# Patient Record
Sex: MALE | Race: WHITE | Employment: UNEMPLOYED | URBAN - METROPOLITAN AREA
[De-identification: names, ages, dates, MRNs, and addresses within clinical notes are randomized per-mention and may not be internally consistent; named-entity substitution may affect disease eponyms.]

---

## 2020-01-26 NOTE — H&P
Patient Name: Dario Álvarez   Account #: [id number]    Gender: Male    (age): 1965 (47)       Provider:     Vijay Garcia MD        Referring Physician:     Lizandro Edwards Brianda, [address] [facility], [facility]  [phone] (phone)  [phone] (fax)        Chief Complaint: GERD (follow-up), dysphagia           History of Present Illness:        Personal history MR, GERD. Has been feeding with g tube for years. With MR, frequently pulling on tube? Past Medical History      Medical Conditions: No Known Conditions   Surgical Procedures: No Prior Procedures   Dx Studies: Abdominal U/S   Medications: baclofen 10 mg Take tablet by mouth three times a day as directed  cholecalciferol (vitamin D3) 50 mcg (2,000 unit) Take 1 capsule by mouth once a day  Jevity 1.5 Agustín 0.06 gram-1.5 kcal/mL three times a day as directed  omeprazole 40 mg Take 1 capsule once a day  Risperdal 1 mg/mL three times a day as directed   Allergies: Patient has no known allergies   Immunizations: Influenza, seasonal, injectable, 2019      Social History      Alcohol: None   Tobacco: Never smoker   Drugs: None   Exercise: None   Caffeine: None        Family History No Knowledge Of Family History       Review of Systems:   Cardiovascular: Denies chest pain, irregular heart beat, palpitations, peripheral edema, syncope, Sweats. Constitutional: Denies fatigue, fever, loss of appetite, weight gain, weight loss. ENMT: Denies nose bleeds, sore throat, hearing loss. Endocrine: Denies excessive thirst, heat intolerance. Eyes: Denies loss of vision. Gastrointestinal: Presents suffers from Bloating/gas, heartburn, Stool incontinence. Denies abdominal pain, abdominal swelling, change in bowel habits, constipation, diarrhea, jaundice, nausea, rectal bleeding, stomach cramps, vomiting, dysphagia, rectal pain, hematemesis. Genitourinary: Presents suffers from incontinence.  Denies dark urine, dysuria, frequent urination, hematuria. Hematologic/Lymphatic: Denies easy bruising, prolonged bleeding. Integumentary: Presents suffers from sun sensitivity. Denies itching, rashes. Musculoskeletal: Presents suffers from stiffness. Denies arthritis, back pain, gout, joint pain, muscle weakness. Neurological: Denies dizziness, fainting, frequent headaches, memory loss. Psychiatric: Presents suffers from anxiety, depression. Denies difficulty sleeping, hallucinations, nervousness, panic attacks, paranoia. Respiratory: Presents suffers from wheezing. Denies cough, dyspnea. Vital Signs: See RN notes        Physical Exam:   Constitutional:      Appearance: No distress, appears comfortable. ENMT:      External: Normal.   Neck:      Neck: Normal appearance, trachea midline. Respiratory:      Effort: Normal respiratory effort, comfortable, speaks in complete sentences. Auscultation: normal breath sounds, no rubs, wheezes or rhonchi. Cardiovascular: Auscultation: normal, S1 and S2, no gallops , no rubs or murmurs . Gastrointestinal/Abdomen:      Abdomen: non-distended, nontender. Liver/Spleen: normal, normal size, Liver size and consistency normal, spleen is non-palpable. Impressions: Breakage of prosthesis? ?     Plan: PEG Change?  ?

## 2020-01-28 RX ORDER — RISPERIDONE 1 MG/1
1 TABLET, FILM COATED ORAL 3 TIMES DAILY
COMMUNITY

## 2020-01-28 RX ORDER — CHOLECALCIFEROL (VITAMIN D3) 125 MCG
1 CAPSULE ORAL DAILY
COMMUNITY

## 2020-01-28 RX ORDER — BACLOFEN 10 MG/1
10 TABLET ORAL 3 TIMES DAILY
COMMUNITY

## 2020-01-29 ENCOUNTER — HOSPITAL ENCOUNTER (OUTPATIENT)
Age: 55
Setting detail: OUTPATIENT SURGERY
Discharge: HOME | End: 2020-01-29
Attending: INTERNAL MEDICINE | Admitting: INTERNAL MEDICINE
Payer: MEDICAID

## 2020-01-29 VITALS
RESPIRATION RATE: 12 BRPM | OXYGEN SATURATION: 95 % | SYSTOLIC BLOOD PRESSURE: 112 MMHG | HEIGHT: 60 IN | TEMPERATURE: 98.9 F | WEIGHT: 80 LBS | BODY MASS INDEX: 15.71 KG/M2 | DIASTOLIC BLOOD PRESSURE: 63 MMHG | HEART RATE: 80 BPM

## 2020-01-29 PROCEDURE — 76040000003: Performed by: INTERNAL MEDICINE

## 2020-01-29 PROCEDURE — 77030036720 HC NDL CORE BIOP MISSION DISP BARD -B: Performed by: INTERNAL MEDICINE

## 2020-01-29 RX ORDER — ATROPINE SULFATE 0.1 MG/ML
0.5 INJECTION INTRAVENOUS
Status: DISCONTINUED | OUTPATIENT
Start: 2020-01-29 | End: 2020-01-29 | Stop reason: HOSPADM

## 2020-01-29 RX ORDER — EPINEPHRINE 0.1 MG/ML
1 INJECTION INTRACARDIAC; INTRAVENOUS
Status: DISCONTINUED | OUTPATIENT
Start: 2020-01-29 | End: 2020-01-29 | Stop reason: HOSPADM

## 2020-01-29 RX ORDER — SODIUM CHLORIDE 9 MG/ML
50 INJECTION, SOLUTION INTRAVENOUS CONTINUOUS
Status: DISCONTINUED | OUTPATIENT
Start: 2020-01-29 | End: 2020-01-29 | Stop reason: HOSPADM

## 2020-01-29 RX ORDER — MIDAZOLAM HYDROCHLORIDE 1 MG/ML
.25-5 INJECTION, SOLUTION INTRAMUSCULAR; INTRAVENOUS
Status: DISCONTINUED | OUTPATIENT
Start: 2020-01-29 | End: 2020-01-29 | Stop reason: HOSPADM

## 2020-01-29 RX ORDER — DEXTROMETHORPHAN/PSEUDOEPHED 2.5-7.5/.8
1.2 DROPS ORAL
Status: DISCONTINUED | OUTPATIENT
Start: 2020-01-29 | End: 2020-01-29 | Stop reason: HOSPADM

## 2020-01-29 RX ORDER — NALOXONE HYDROCHLORIDE 0.4 MG/ML
0.4 INJECTION, SOLUTION INTRAMUSCULAR; INTRAVENOUS; SUBCUTANEOUS
Status: DISCONTINUED | OUTPATIENT
Start: 2020-01-29 | End: 2020-01-29 | Stop reason: HOSPADM

## 2020-01-29 RX ORDER — FLUMAZENIL 0.1 MG/ML
0.2 INJECTION INTRAVENOUS
Status: DISCONTINUED | OUTPATIENT
Start: 2020-01-29 | End: 2020-01-29 | Stop reason: HOSPADM

## 2020-01-29 RX ORDER — FENTANYL CITRATE 50 UG/ML
100 INJECTION, SOLUTION INTRAMUSCULAR; INTRAVENOUS
Status: DISCONTINUED | OUTPATIENT
Start: 2020-01-29 | End: 2020-01-29 | Stop reason: HOSPADM

## 2020-01-29 NOTE — ROUTINE PROCESS
Raine Ellyn 1965 
[id number] Situation: 
Verbal report received from: Raven Chinchilla RN Procedure: Procedure(s): PERCUTANEOUS ENDOSCOPIC GASTROSTOMY TUBE CHANGE (WITHOUT EGD) Background: 
 
Preoperative diagnosis: BREAKAGE OF PROSTHESIS Postoperative diagnosis: * No post-op diagnosis entered * :  Dr. Andrew Shultz Assistant(s): Endoscopy Technician-1: Juan Hogan Endoscopy RN-1: Patrick Guthrie RN Specimens: * No specimens in log * H. Pylori  no Assessment: 
Intra-procedure medications Anesthesia gave intra-procedure sedation and medications, see anesthesia flow sheet yes Intravenous fluids: NS@ Lori Bolus Vital signs stable Abdominal assessment: round and soft Recommendation: 
Discharge patient per MD order. Family or Friend Permission to share finding with family or friend yes

## 2020-01-29 NOTE — PROCEDURES
PEG removal, PEG button placement      Indication: Feeding difficulties  Post procedure: same    No complications, blood loss    PEG button implant    Findings:    Existing tube removed manually; no endoscopy done, no medications given. 20 mm PEG button device placed and anchored.     May resume feedings today

## 2020-01-29 NOTE — PERIOP NOTES
1310 Procedure being performed with no sedation, nurse at bedside monitoring patient. No scope was used to exchange out PEG tube. Patient tolerated procedure well. Abdomen remains soft and non tender post procedure, no complaints or indication of discomfort noted at this time. Patient transferred to Endoscopy Recovery and report given to recovery nurse.

## 2022-05-05 ENCOUNTER — OFFICE VISIT (OUTPATIENT)
Dept: FAMILY MEDICINE CLINIC | Age: 57
End: 2022-05-05
Payer: MEDICAID

## 2022-05-05 VITALS
WEIGHT: 78 LBS | SYSTOLIC BLOOD PRESSURE: 99 MMHG | BODY MASS INDEX: 15.31 KG/M2 | HEART RATE: 96 BPM | TEMPERATURE: 98.3 F | HEIGHT: 60 IN | DIASTOLIC BLOOD PRESSURE: 67 MMHG | OXYGEN SATURATION: 97 % | RESPIRATION RATE: 14 BRPM

## 2022-05-05 DIAGNOSIS — E55.9 VITAMIN D DEFICIENCY: ICD-10-CM

## 2022-05-05 DIAGNOSIS — Z11.1 SCREENING-PULMONARY TB: ICD-10-CM

## 2022-05-05 DIAGNOSIS — R13.10 DYSPHAGIA, UNSPECIFIED TYPE: ICD-10-CM

## 2022-05-05 DIAGNOSIS — R63.6 UNDERWEIGHT: ICD-10-CM

## 2022-05-05 DIAGNOSIS — Z00.00 PHYSICAL EXAM: Primary | ICD-10-CM

## 2022-05-05 DIAGNOSIS — F79 INTELLECTUAL DISABILITY: ICD-10-CM

## 2022-05-05 DIAGNOSIS — Z99.3 WHEELCHAIR BOUND: ICD-10-CM

## 2022-05-05 DIAGNOSIS — Z93.1 GASTROSTOMY TUBE DEPENDENT (HCC): ICD-10-CM

## 2022-05-05 DIAGNOSIS — G80.9 CEREBRAL PALSY, UNSPECIFIED TYPE (HCC): ICD-10-CM

## 2022-05-05 PROCEDURE — 99204 OFFICE O/P NEW MOD 45 MIN: CPT | Performed by: FAMILY MEDICINE

## 2022-05-05 RX ORDER — POLYETHYLENE GLYCOL 3350 17 G/17G
8 POWDER, FOR SOLUTION ORAL DAILY
COMMUNITY
Start: 2022-02-27

## 2022-05-05 RX ORDER — GLYCOPYRROLATE 1 MG/5ML
LIQUID ORAL
COMMUNITY
Start: 2022-03-24

## 2022-05-05 RX ORDER — IPRATROPIUM BROMIDE 21 UG/1
2 SPRAY, METERED NASAL 2 TIMES DAILY
COMMUNITY
Start: 2022-03-30

## 2022-05-05 RX ORDER — PHENOLPHTHALEIN 90 MG
10 TABLET,CHEWABLE ORAL DAILY
COMMUNITY
Start: 2022-02-04

## 2022-05-05 NOTE — PROGRESS NOTES
Svitlana Damon is a 64 y.o. male    Chief Complaint   Patient presents with    New Patient    Establish Care       1. Have you been to the ER, urgent care clinic since your last visit? Hospitalized since your last visit? No    2. Have you seen or consulted any other health care providers outside of the [address] [place] [address] since your last visit? Include any pap smears or colon screening.  No

## 2022-05-05 NOTE — PROGRESS NOTES
Vesta Wilks (: 1965) is a 64 y.o. male, new patient, here for evaluation of the following chief complaint(s):  New Patient and Establish Care         ASSESSMENT/PLAN:  Below is the assessment and plan developed based on review of pertinent history, physical exam, labs, studies, and medications. 1. Physical exam  Underweight, wheelchair bound male who does not appear ill. Minimal communication but he will make eye contact and follow simple commands   Communication with answering \"yes\" by holding out R hand  Labs unable to be drawn today-- caregivers state they have been able to have them drawn at [address] [place] [address] and will coordinate with [facility]    Records requested from Previous PCP Wilmer Jimenes from [facility]. Will review health maintenance upon receipt    -     CBC WITH AUTOMATED DIFF; Future  -     METABOLIC PANEL, COMPREHENSIVE; Future  -     LIPID PANEL; Future  -     TSH 3RD GENERATION; Future    2. Underweight  Current BMI 17.49 lbs and patient appears thin but not cachectic. Current nutrition includes Jevity 1 can (237mL) via peg TID   Managed by  [facility]   They do not meet with nutritionist   At this point I am unable to determine if this is patient's baseline weight or if he has lost weight as they do not weigh him at the home   -Recommend they START meeting with nutritionist to ensure adequate caloric intake   -They have nutritionist at [facility] they would like to work with and will reach out to her   -Check labs as well-- they will coordinate with [facility] as we were unable to draw the labs in office today   -     [address] DIFF; Future  -     METABOLIC PANEL, COMPREHENSIVE; Future  -     LIPID PANEL; Future  -     TSH 3RD GENERATION; Future  -     VITAMIN D, 25 HYDROXY; Future    3. Vitamin D deficiency  Currently on Vit D 3 2,000IU 1 tab via peg daily   Recheck today to ensure adequate supplementation   -     VITAMIN D, 25 HYDROXY; Future    4.  Cerebral palsy, unspecified type ([facility]  5. Wheelchair bound  Spastic, no use of lower extremities, L arm permanent contracture and minimal use of R arm     6. Gastrostomy tube dependent ([facility]  7. Dysphagia, unspecified type  [facility]   Kirt device  PRN medication list corrected today with instructions for NPO   Records release signed today to obtain records from GI     8. Intellectual disability  Currently on Risperdal 1mg/mL, 1 mL via peg TID for anxiety, aggressive behaviors and self injurious behaviors  Managed by Dr. Williams [facility]    9. Lives in group home  [address] [place] [address] in [place] [address] filled out today     10. Screening-pulmonary TB  Unable to draw labs today-- Quantiferon gold canceled   Return to office next week for PPD placement for TB testing.   -     QUANTIFERON-TB GOLD PLUS      Return for next week PPD, 6 to 12 weeks follow up. SUBJECTIVE/OBJECTIVE:  Chief Complaint   Patient presents with    New Patient    Establish Care     Patient is a 80-year-old male with cerebral palsy, intellectual disability, wheelchair-bound, dysphagia, gastrostomy tube dependent, developmental nonverbal disorder who lives in a group home presenting to office to establish care. Patient was previously [facility] by [facility] however due to insurance changes they are normally able to see the patient. Patient is accompanied by 2 female caregivers from the group home. Group home is called [facility]. Patient has spasticity of his muscles and is wheelchair-bound due to cerebral palsy. His right arm is slightly mobile but his left arm is not. He can lift his hips to help with dressing otherwise no movement of lower extremities      Patient has a Kirt PEG tube. He is NPO. He sees Dr. Twin Louis at [facility] for management of the PEG tube and his nutrition. He is currently on Jevity 237 mL via PEG 3 times daily.    PEG tube is changed every 3 months. They clean it 3 times a day and use barrier cream.     He does not currently meet with a nutritionist.  Caregivers report he has not lost any weight recently they are aware of and GI doctor has not made a comment about his weight. He sees psychiatry Dr. Jacobo Hennessy at [address] [place] [address]. He is on Risperdal for mood stability. He is nonverbal, but will make sounds. Caregivers report he communicates by placing his right hand in your hand to answer \"yes\"  laurel. They complete range of motion exercises with his upper and lower extremities 3 times per day. They have no specific complaints today, however patient is due for labs as well as TB testing. Phlebotomist was unable to draw his labs in the office today. They report they have been able to draw them with [facility].  They were planning to [facility] for labs. However they will return next week for PPD placement. Review of systems negative other than mentioned in HPI    Current Outpatient Medications on File Prior to Visit   Medication Sig Dispense Refill    Cuvposa 1 mg/5 mL (0.2 mg/mL) soln 1mg/5ML via peg once daily for excessive saliva.  ipratropium (ATROVENT) 21 mcg (0.03 %) nasal spray 2 Sprays by Both Nostrils route two (2) times a day. 3 spray      polyethylene glycol (MIRALAX) 17 gram/dose powder Take 8 g by mouth daily.  loratadine (CLARITIN) 5 mg/5 mL syrup Take 10 mg by mouth daily.  baclofen (LIORESAL) 10 mg tablet 10 mg by Per G Tube route three (3) times daily. Indications: muscle spasms caused by a spinal disease, peg tube      cholecalciferol, vitamin D3, (VITAMIN D3) 50 mcg (2,000 unit) tab 1 Tab by PEG Tube route daily.  OMEPRAZOLE PO 40 mg by PEG Tube route daily. Indications: [facility] risperiDONE (RISPERDAL) 1 mg tablet 1 mg by Per G Tube route three (3) times daily. Via peg tube  Indications: anxiety       No current facility-administered medications on file prior to visit.      Vitals:    05/05/22 1410 BP: 99/67   Pulse: 96   Resp: 14   Temp: 98.3 °F (36.8 °C)   TempSrc: Oral   SpO2: 97%   Weight: 78 lb (35.4 kg)   Height: 4' 8\" (1.422 m)   PainSc:   0 - No pain        Physical Exam  Constitutional:       General: He is not in acute distress. Appearance: He is underweight. He is not ill-appearing or toxic-appearing. Comments: Seated in wheelchair   HENT:      Head: Normocephalic and atraumatic. Right Ear: Tympanic membrane, ear canal and external ear normal.      Left Ear: Tympanic membrane, ear canal and external ear normal.      Nose: Nose normal.      Mouth/Throat:      Mouth: Mucous membranes are moist.      Pharynx: Oropharynx is clear. Comments: No teeth. Gums are smooth with no lesions present. Eyes:      Conjunctiva/sclera: Conjunctivae normal.      Pupils: Pupils are equal, round, and reactive to light. Cardiovascular:      Rate and Rhythm: Normal rate and regular rhythm. Pulses: Normal pulses. Posterior tibial pulses are 2+ on the right side and 2+ on the left side. Heart sounds: Normal heart sounds. No murmur heard. No friction rub. No gallop. Pulmonary:      Effort: Pulmonary effort is normal. No respiratory distress. Breath sounds: Normal breath sounds. No wheezing or rales. Abdominal:      General: Abdomen is flat. Bowel sounds are normal. There is no distension. Palpations: Abdomen is soft. There is no mass. Tenderness: There is no abdominal tenderness. There is no guarding. Comments: Peg tube in place. No surrounding erythema, edema or signs of infection   Musculoskeletal:      Cervical back: Normal range of motion and neck supple. No rigidity or tenderness. Right lower leg: No edema. Left lower leg: No edema. Comments: Seated in wheelchair. Muscle spasms of right and left UE prevent complete extension or bilateral shoulders, elbows or wrists. Left lower extremity muscle mass slightly less than right. Lymphadenopathy:      Cervical: No cervical adenopathy. Skin:     General: Skin is warm and dry. Coloration: Skin is not jaundiced or pale. Findings: No rash (no rash on visible skin). Neurological:      Mental Status: Mental status is at baseline. Psychiatric:         Mood and Affect: Mood normal.         Behavior: Behavior is cooperative. An electronic signature was used to authenticate this note.   -- Janice Dowd PA-C

## 2022-05-05 NOTE — PATIENT INSTRUCTIONS
Call to establish care with nutritionist     Lula Zayas for labs     Return to office next week for PPD placement

## 2022-05-06 PROBLEM — R13.10 DYSPHAGIA: Status: ACTIVE | Noted: 2022-05-06

## 2022-05-06 PROBLEM — E55.9 VITAMIN D DEFICIENCY: Status: ACTIVE | Noted: 2022-05-06

## 2022-05-06 PROBLEM — Z99.3 WHEELCHAIR BOUND: Status: ACTIVE | Noted: 2022-05-06

## 2022-05-06 PROBLEM — G80.9 CEREBRAL PALSY (HCC): Status: ACTIVE | Noted: 2022-05-06

## 2022-05-06 PROBLEM — Z78.9 LIVES IN GROUP HOME: Status: ACTIVE | Noted: 2022-05-06

## 2022-05-06 PROBLEM — Z93.1 GASTROSTOMY TUBE DEPENDENT (HCC): Status: ACTIVE | Noted: 2022-05-06

## 2022-05-06 PROBLEM — F79 INTELLECTUAL DISABILITY: Status: ACTIVE | Noted: 2022-05-06

## 2022-08-29 ENCOUNTER — TELEPHONE (OUTPATIENT)
Dept: FAMILY MEDICINE CLINIC | Age: 57
End: 2022-08-29

## 2022-08-29 DIAGNOSIS — K21.9 GASTROESOPHAGEAL REFLUX DISEASE, UNSPECIFIED WHETHER ESOPHAGITIS PRESENT: Primary | ICD-10-CM

## 2022-08-29 RX ORDER — SUCRALFATE 1 G/10ML
SUSPENSION ORAL
Qty: 414 ML | Refills: 1 | Status: SHIPPED | OUTPATIENT
Start: 2022-08-29

## 2022-08-29 NOTE — TELEPHONE ENCOUNTER
Called and spoke with caregiver. Informed him the medication was not transcribed into our chart but the Sucralfate medication has been sent to the pharmacy. He verbalized understanding and apologized for the way he reacted earlier. Mireille Gross PA-C  to Me    [address]    1:45 PM  Please return call to caregiver. I found the physician order form in the media from our visit 5/2022. Sucralfate did not get put into chart as a current med but it was reviewed with patient, myself and caregiver and is on the order form. [facility]. Please let me know if there are further issues with the refill. Miralax is also not on his med list but it is on the physician order form. Can you update that in his chart?    Alicia Barahona PA-C

## 2022-08-29 NOTE — TELEPHONE ENCOUNTER
Called and spoke with nurse/supervisor in reference to the pt. Relayed the previous message from [place], [place]. He was very upset and had attitude and stated we are not functional and he has this problem all the time with our office and his patients. States Antony Nguyen did prescribe the medication he asked for and signed the forms for the medication change and scanned it into the chart. He states he will bring the forms to the office and will speak with the supervisor because the nurses here are not organized or functional. We do not know how to do our jobs.

## 2022-08-29 NOTE — TELEPHONE ENCOUNTER
Please call to obtain further information this is not a current med listed in his chart is it coming from his GI doctor?

## 2022-09-01 ENCOUNTER — DOCUMENTATION ONLY (OUTPATIENT)
Dept: FAMILY MEDICINE CLINIC | Age: 57
End: 2022-09-01

## 2022-09-01 NOTE — PROGRESS NOTES
[facility] / [facility] medication request was put on ROBIN MOE [facility] desk to process

## 2022-09-07 ENCOUNTER — TELEPHONE (OUTPATIENT)
Dept: FAMILY MEDICINE CLINIC | Age: 57
End: 2022-09-07

## 2022-09-07 NOTE — TELEPHONE ENCOUNTER
Please call to schedule face to face (virtual is fine) to document medical necessity and place order for incontinence supplies

## 2022-09-07 NOTE — TELEPHONE ENCOUNTER
----- Message from Philemon Felty sent at 9/7/2022 11:05 AM EDT -----  Subject: Message to Provider    QUESTIONS  Information for Provider? Ashwin Hidalgo from [address]   regards to the patient and states he needs increase in incontinent   supplies. ---------------------------------------------------------------------------  --------------  Delfin Hernandez INFO  [phone]; OK to leave message on voicemail  ---------------------------------------------------------------------------  --------------  SCRIPT ANSWERS  Relationship to Patient? Third Party  Third Party Type? [address]? Representative Name?  Ashwin Hidalgo

## 2022-09-07 NOTE — TELEPHONE ENCOUNTER
Called and spoke with pt's caregiver / emergency contact [place]. Relayed the message per Sidra Doty PA-C. He verbalized understanding. Virtual appt scheduled for 9/14/22 at 2:15.

## 2022-09-09 ENCOUNTER — DOCUMENTATION ONLY (OUTPATIENT)
Dept: FAMILY MEDICINE CLINIC | Age: 57
End: 2022-09-09

## 2022-09-14 ENCOUNTER — TELEPHONE (OUTPATIENT)
Dept: FAMILY MEDICINE CLINIC | Age: 57
End: 2022-09-14

## 2022-09-14 ENCOUNTER — VIRTUAL VISIT (OUTPATIENT)
Dept: FAMILY MEDICINE CLINIC | Age: 57
End: 2022-09-14
Payer: MEDICAID

## 2022-09-14 DIAGNOSIS — R15.9 INCONTINENCE OF FECES, UNSPECIFIED FECAL INCONTINENCE TYPE: ICD-10-CM

## 2022-09-14 DIAGNOSIS — R32 URINARY INCONTINENCE, UNSPECIFIED TYPE: ICD-10-CM

## 2022-09-14 DIAGNOSIS — F79 INTELLECTUAL DISABILITY: ICD-10-CM

## 2022-09-14 DIAGNOSIS — G80.9 CEREBRAL PALSY, UNSPECIFIED TYPE (HCC): Primary | ICD-10-CM

## 2022-09-14 PROCEDURE — 99213 OFFICE O/P EST LOW 20 MIN: CPT | Performed by: FAMILY MEDICINE

## 2022-09-14 NOTE — PROGRESS NOTES
Sarah Solorzano (: 1965) is a 64 y.o. male, established patient, here for evaluation of the following chief complaint(s):   Follow-up (Caregiver reports follow up to document medical necessity for incontinent supplies. /)       ASSESSMENT/PLAN:  Below is the assessment and plan developed based on review of pertinent history, labs, studies, and medications. 1. Cerebral palsy, unspecified type ([address]  2. Intellectual disability  3. Urinary incontinence, unspecified type  4. Incontinence of feces, unspecified fecal incontinence type  -needs 12 medium adult diaper briefs and male guards per day for urinary and bowl incontinence due to medical conditions as above  -also needs barrier cream for skin protection  -group home to have home care delivered fax order to me to fill out     No follow-ups on file. SUBJECTIVE/OBJECTIVE:  Chief Complaint   Patient presents with    Follow-up     Caregiver reports follow up to document medical necessity for incontinent supplies. Elizabeth Keating presents for incontience supplies. He has cerebral palsy and is wheelchair-bound. He is also gastrostomy tube dependent and has intellectual disability. He is nonverbal.  He lives in a group home. He has incontinence of bowel and bladder. He was accompanied by CNA as well as director of group home, Zeenat Castaneda from [employer] [facility] [employer]. Staff a group home change him every 2 hours. No new 12 size medium adult diaper briefs per day as well as 15 male guards per day and barrier cream.    He has a ge button as well. Per caregivers he has a history of a bladder tumor which was benign and they elected for no surgery or any further treatment with urology 2 years ago. He has not had any change in his bowel or bladder function in the last year. They typically receive incontinent supplies and home care delivered, they will contact home care delivered to send order form to me.       Patient-Reported Systolic (Top): 110  Patient-Reported Diastolic (Bottom): 68  Patient-Reported Pulse: 70  Patient-Reported Weight: 80lb     Patient Active Problem List   Diagnosis Code    Cerebral palsy ([facility] G80.9    Vitamin D deficiency E55.9    Lives in group home Z59.3    Intellectual disability F79    Gastrost[facility] tube dependent ([facility] Z93.1    Dysphagia R13.10    Wheelchair bound Z99.3     Current Outpatient Medications on File Prior to Visit   Medication Sig Dispense Refill    sucralfate (CARAFATE) 100 mg/mL suspension Take 5mL by peg two times a day for GERD 414 mL 1    ipratropium (ATROVENT) 21 mcg (0.03 %) nasal spray 2 Sprays by Both Nostrils route two (2) times a day. 3 spray      polyethylene glycol (MIRALAX) 17 gram/dose powder Take 8 g by mouth daily. loratadine (CLARITIN) 5 mg/5 mL syrup Take 10 mg by mouth daily. baclofen (LIORESAL) 10 mg tablet 10 mg by Per G Tube route three (3) times daily. Indications: muscle spasms caused by a spinal disease, peg tube      cholecalciferol, vitamin D3, 50 mcg (2,000 unit) tab 1 Tab by PEG Tube route daily. OMEPRAZOLE PO 40 mg by PEG Tube route daily. Indications: Gerd      risperiDONE (RisperDAL) 1 mg tablet 1 mg by Per G Tube route three (3) times daily. Via peg tube  Indications: anxiety      Cuvposa 1 mg/5 mL (0.2 mg/mL) soln 1mg/5ML via peg once daily for excessive saliva. No current facility-administered medications on file prior to visit. [INSTRUCTIONS:  \"[x]\" Indicates a positive item  \"[]\" Indicates a negative item  -- DELETE ALL ITEMS NOT EXAMINED]    Constitutional: [x] Appears well-developed and well-nourished [x] No apparent distress      [] Abnormal -     Mental status: [] Alert and awake  [] Oriented to person/place/time [] Able to follow commands    [] Abnormal -he is alert but nonverbal, does not follow simple commands. He is at his baseline.     Eyes:   EOM    [x]  Normal    [] Abnormal -   Sclera  [x]  Normal    [] Abnormal -          Discharge [x] None visible   [] Abnormal -     HENT: [x] Normocephalic, atraumatic  [] Abnormal -   [x] Mouth/Throat: Mucous membranes are moist    External Ears [x] Normal  [] Abnormal -    Neck: [x] No visualized mass [] Abnormal -     Pulmonary/Chest: [x] Respiratory effort normal   [x] No visualized signs of difficulty breathing or respiratory distress        [] Abnormal -      Musculoskeletal:   [] Normal gait with no signs of ataxia         [] Normal range of motion of neck        [x] Abnormal -wheelchair-bound, muscle spasms of the right and left upper extremity. Neurological:        [x] No Facial Asymmetry (Cranial nerve 7 motor function) (limited exam due to video visit)          [] No gaze palsy        [] Abnormal -          Skin:        [x] No significant exanthematous lesions or discoloration noted on facial skin         [] Abnormal -            Psychiatric:       [] Normal Affect [x] Abnormal -nonverbal, at baseline. [] No Hallucinations    Other pertinent observable physical exam findings:-    On this date 09/14/2022 I have spent 30 minutes reviewing previous notes, test results and face to face (virtual) with the patient discussing the diagnosis and importance of compliance with the treatment plan as well as documenting on the day of the visit. Severo Matos, was evaluated through a synchronous (real-time) audio-video encounter. The patient (or guardian if applicable) is aware that this is a billable service, which includes applicable co-pays. This Virtual Visit was conducted with patient's (and/or legal guardian's) consent. The visit was conducted pursuant to the emergency declaration under the [facility], [address] [place] [address] authority and the [employer] Act. Patient identification was verified, and a caregiver was present when appropriate.   The patient was located at: Home: [address] [place] [address]  [address]  The provider was located at: Facility (Appt Department): [address] [facility] [address]       An electronic signature was used to authenticate this note.   -- Aparna Ely PA-C

## 2022-09-14 NOTE — TELEPHONE ENCOUNTER
Attempt to contact caregiver Jagdeep Villa to get pt checked in for appt today. Unsuccessful. LVM to return call. 1421 Attempt to contact caregiver. Unsuccessful. LVM to return call. 1429 Attempt to contact caregiver. Unsuccessful. LVM to return call.

## 2022-09-14 NOTE — PROGRESS NOTES
Raymond Silva is a 64 y.o. male , id x 2(name and ). Reviewed questionnaires, and  medications. Chief Complaint   Patient presents with    Follow-up     Caregiver reports follow up to document medical necessity for incontinent supplies. 1. Have you been to the ER, urgent care clinic since your last visit? Hospitalized since your last visit? No    2. Have you seen or consulted any other health care providers outside of the [address] [place] [address] since your last visit? Include any pap smears or colon screening.  No

## 2022-09-16 ENCOUNTER — DOCUMENTATION ONLY (OUTPATIENT)
Dept: FAMILY MEDICINE CLINIC | Age: 57
End: 2022-09-16

## 2022-09-16 NOTE — PROGRESS NOTES
[facility] medication review fax to [address] [place] [address] (fax# [phone]). Confirmation received.

## 2022-09-16 NOTE — PROGRESS NOTES
[facility] / [address] medication review was put on ROBIN MOE [facility] desk to process

## 2022-09-29 ENCOUNTER — DOCUMENTATION ONLY (OUTPATIENT)
Dept: FAMILY MEDICINE CLINIC | Age: 57
End: 2022-09-29

## 2022-09-29 NOTE — PROGRESS NOTES
Home Care Delivered CMN for durable medical was put on [facility] desk to process

## 2022-09-30 ENCOUNTER — DOCUMENTATION ONLY (OUTPATIENT)
Dept: FAMILY MEDICINE CLINIC | Age: 57
End: 2022-09-30

## 2022-09-30 NOTE — PROGRESS NOTES
BEE faxed to [address] [place] [address]. Fax number [phone] confirmation number [id number].

## 2022-11-14 ENCOUNTER — OFFICE VISIT (OUTPATIENT)
Dept: FAMILY MEDICINE CLINIC | Age: 57
End: 2022-11-14
Payer: MEDICAID

## 2022-11-14 VITALS
HEIGHT: 60 IN | OXYGEN SATURATION: 98 % | WEIGHT: 81.2 LBS | BODY MASS INDEX: 15.94 KG/M2 | TEMPERATURE: 98.8 F | HEART RATE: 112 BPM | SYSTOLIC BLOOD PRESSURE: 91 MMHG | DIASTOLIC BLOOD PRESSURE: 61 MMHG | RESPIRATION RATE: 18 BRPM

## 2022-11-14 DIAGNOSIS — E63.9 POOR NUTRITION: ICD-10-CM

## 2022-11-14 DIAGNOSIS — D64.9 ANEMIA, UNSPECIFIED TYPE: Primary | ICD-10-CM

## 2022-11-14 LAB
BASOPHILS # BLD: 0.1 K/UL (ref 0–0.1)
BASOPHILS NFR BLD: 1 % (ref 0–1)
DIFFERENTIAL METHOD BLD: ABNORMAL
EOSINOPHIL # BLD: 0 K/UL (ref 0–0.4)
EOSINOPHIL NFR BLD: 0 % (ref 0–7)
ERYTHROCYTE [DISTWIDTH] IN BLOOD BY AUTOMATED COUNT: 15.8 % (ref 11.5–14.5)
FERRITIN SERPL-MCNC: 5 NG/ML (ref 26–388)
FOLATE SERPL-MCNC: 19.1 NG/ML (ref 5–21)
HCT VFR BLD AUTO: 37.8 % (ref 36.6–50.3)
HGB BLD-MCNC: 11.2 G/DL (ref 12.1–17)
IMM GRANULOCYTES # BLD AUTO: 0 K/UL (ref 0–0.04)
IMM GRANULOCYTES NFR BLD AUTO: 0 % (ref 0–0.5)
IRON SATN MFR SERPL: 7 % (ref 20–50)
IRON SERPL-MCNC: 34 UG/DL (ref 35–150)
LYMPHOCYTES # BLD: 0.5 K/UL (ref 0.8–3.5)
LYMPHOCYTES NFR BLD: 9 % (ref 12–49)
MCH RBC QN AUTO: 27.1 PG (ref 26–34)
MCHC RBC AUTO-ENTMCNC: 29.6 G/DL (ref 30–36.5)
MCV RBC AUTO: 91.5 FL (ref 80–99)
MONOCYTES # BLD: 0.6 K/UL (ref 0–1)
MONOCYTES NFR BLD: 12 % (ref 5–13)
NEUTS SEG # BLD: 4.1 K/UL (ref 1.8–8)
NEUTS SEG NFR BLD: 78 % (ref 32–75)
NRBC # BLD: 0 K/UL (ref 0–0.01)
NRBC BLD-RTO: 0 PER 100 WBC
PLATELET # BLD AUTO: 332 K/UL (ref 150–400)
PMV BLD AUTO: 11.5 FL (ref 8.9–12.9)
RBC # BLD AUTO: 4.13 M/UL (ref 4.1–5.7)
RBC MORPH BLD: ABNORMAL
RBC MORPH BLD: ABNORMAL
TIBC SERPL-MCNC: 492 UG/DL (ref 250–450)
VIT B12 SERPL-MCNC: 1048 PG/ML (ref 193–986)
WBC # BLD AUTO: 5.3 K/UL (ref 4.1–11.1)

## 2022-11-14 PROCEDURE — 99214 OFFICE O/P EST MOD 30 MIN: CPT | Performed by: FAMILY MEDICINE

## 2022-11-14 NOTE — PROGRESS NOTES
April Sanchez (: 1965) is a 64 y.o. male, established patient, here for evaluation of the following chief complaint(s):  Follow-up and Labs (Discuss low iron levels. )         ASSESSMENT/PLAN:  Below is the assessment and plan developed based on review of pertinent history, physical exam, labs, studies, and medications. 1. Anemia, unspecified type  Per caregiver patient had GIOVANNA on labs a week or so ago with psych  Recheck today   Patient is g-tube dependent and has never had colon cancer screening they know of, he does see GI Dr. Anthony Reece. Records release filled out today so we can send them labs and note when resulted   -labs requested from psych today   -     CBC WITH AUTOMATED DIFF; Future  -     IRON PROFILE; Future  -     FERRITIN; Future  -     VITAMIN B12 & FOLATE; Future    2. Poor nutrition  BMI low at 18.20, he has gained 3 lbs since may. They just started working with nutritionist and increased jevity feedings from TID to 4x/day   -they will need monthly weights for monitoring for nutritionist but they do not have a scale at home to weigh him  -OK to have him come to office for appts for monthly weight checks and we will fax notes to nutritionist, records releases filled out today to obtain notes from nutritionist as well as be able to send them our notes. Form filled out for group home today. Follow up 1 mo weight       SUBJECTIVE/OBJECTIVE:  Chief Complaint   Patient presents with    Follow-up    Labs     Discuss low iron levels. Patient is a 65 yo male with cerebral palsy, intellectual dysfunction, gastrostomy tube dependent, lives in group home presenting to office for anemia. He is with caregiver who provides history as patient is nonverbal. Group home director Sherly Knight was also on speaker phone during the visit. Anemia was noted on labs with psych Dr. Ramana Orlando recently, they report psych said this was GIOVANNA but labs are not available to review.    They recommended following up with PCP for recheck. Patient is gastrostomy tube dependent and they just increased jevity feedings to 4 times per day from TID. They are working with nutritionist and GI (GI is Dr. Siddharth Flores). They need monthly weight checks and do not have a scale they can use that is reliable at home. Caregivers report there has been no blood in the stool, no blood in urine. Urinary and bowel functions have not changed. No weight loss, no night sweats. He has never had colon cancer screenings. He does have labs at least yearly from psych. Review of systems negative other than mentioned in HPI    Current Outpatient Medications on File Prior to Visit   Medication Sig Dispense Refill    sucralfate (CARAFATE) 100 mg/mL suspension Take 5mL by peg two times a day for GERD 414 mL 1    Cuvposa 1 mg/5 mL (0.2 mg/mL) soln 1mg/5ML via peg once daily for excessive saliva. ipratropium (ATROVENT) 21 mcg (0.03 %) nasal spray 2 Sprays by Both Nostrils route two (2) times a day. 3 spray      polyethylene glycol (MIRALAX) 17 gram/dose powder Take 8 g by mouth daily. loratadine (CLARITIN) 5 mg/5 mL syrup Take 10 mg by mouth daily. baclofen (LIORESAL) 10 mg tablet 10 mg by Per G Tube route three (3) times daily. Indications: muscle spasms caused by a spinal disease, peg tube      cholecalciferol, vitamin D3, 50 mcg (2,000 unit) tab 1 Tab by PEG Tube route daily. OMEPRAZOLE PO 40 mg by PEG Tube route daily. Indications: Gerd      risperiDONE (RisperDAL) 1 mg tablet 1 mg by Per G Tube route three (3) times daily. Via peg tube  Indications: anxiety       No current facility-administered medications on file prior to visit.      Patient Active Problem List    Diagnosis Date Noted    Cerebral palsy ([facility] 05/06/2022    Vitamin D deficiency 05/06/2022    Lives in group home 05/06/2022    Intellectual disability 05/06/2022    Gastrostomy tube dependent ([facility] 05/06/2022    Dysphagia 05/06/2022    Wheelchair bound 05/06/2022     No Known Allergies  Past Surgical History:   Procedure Laterality Date    CT ABDOMEN SURGERY PROC UNLISTED      peg     Social History     Tobacco Use    Smoking status: Never    Smokeless tobacco: Never   Vaping Use    Vaping Use: Never used   Substance Use Topics    Alcohol use: Never    Drug use: Never     History reviewed. No pertinent family history. Vitals:    11/14/22 0941   BP: 91/61   Pulse: (!) 112   Resp: 18   Temp: 98.8 °F (37.1 °C)   TempSrc: Temporal   SpO2: 98%   Weight: 81 lb 3.2 oz (36.8 kg)   Height: 4' 8\" (1.422 m)   PainSc:   0 - No pain        Physical Exam  Constitutional:       General: He is not in acute distress. Appearance: He is underweight. He is not ill-appearing or toxic-appearing. Comments: Seated in wheelchair   HENT:      Head: Normocephalic and atraumatic. Right Ear: Tympanic membrane, ear canal and external ear normal.      Left Ear: Tympanic membrane, ear canal and external ear normal.      Nose: Nose normal.      Mouth/Throat:      Mouth: Mucous membranes are moist.      Pharynx: Oropharynx is clear. Comments: No teeth. Gums are smooth with no lesions present. Eyes:      Conjunctiva/sclera: Conjunctivae normal.      Pupils: Pupils are equal, round, and reactive to light. Cardiovascular:      Rate and Rhythm: Normal rate and regular rhythm. Pulses: Normal pulses. Heart sounds: Normal heart sounds. No murmur heard. No friction rub. No gallop. Pulmonary:      Effort: Pulmonary effort is normal. No respiratory distress. Breath sounds: Normal breath sounds. No wheezing or rales. Abdominal:      General: Abdomen is flat. Bowel sounds are normal. There is no distension. Palpations: Abdomen is soft. There is no mass. Tenderness: There is no abdominal tenderness. There is no guarding. Comments: Peg tube in place.  No surrounding erythema, edema or signs of infection   Musculoskeletal:      Cervical back: Normal range of motion and neck supple. No rigidity or tenderness. Right lower leg: No edema. Left lower leg: No edema. Comments: Seated in wheelchair. Muscle spasms of right and left UE prevent complete extension or bilateral shoulders, elbows or wrists. Left lower extremity muscle mass slightly less than right. Lymphadenopathy:      Cervical: No cervical adenopathy. Skin:     General: Skin is warm and dry. Coloration: Skin is not jaundiced or pale. Findings: No rash (no rash on visible skin). Neurological:      Mental Status: Mental status is at baseline. Psychiatric:         Mood and Affect: Mood normal.         Behavior: Behavior is cooperative. An electronic signature was used to authenticate this note.   -- Sd Rizo PA-C

## 2022-11-14 NOTE — PROGRESS NOTES
Mat Hernandez is a 64 y.o. male , id x 2(name and ). Reviewed record, history, and  medications. Chief Complaint   Patient presents with    Follow-up    Labs     Discuss low iron levels. Vitals:    22 0941   BP: 91/61   Pulse: (!) 112   Resp: 18   Temp: 98.8 °F (37.1 °C)   TempSrc: Temporal   SpO2: 98%   Weight: 81 lb 3.2 oz (36.8 kg)   Height: 4' 8\" (1.422 m)       Coordination of Care Questionnaire:   1. Have you been to the ER, urgent care clinic since your last visit? Hospitalized since your last visit? No    2. Have you seen or consulted any other health care providers outside of the [address] [place] [address] since your last visit? Include any pap smears or colon screening.  No

## 2022-11-16 RX ORDER — FERROUS SULFATE 300 MG/5ML
300 LIQUID (ML) ORAL DAILY
Qty: 150 ML | Refills: 0 | Status: SHIPPED | OUTPATIENT
Start: 2022-11-16

## 2022-11-16 NOTE — PROGRESS NOTES
Called and spoke with pt's caregiver. Caregiver verified pt's full name and birthdate. Notified as per Joel Pitts PA-C and verbalized understanding.

## 2022-11-16 NOTE — PROGRESS NOTES
Please call caregivers and advise  -labs confirm iron deficiency anemia. I have called in a once daily liquid iron for supplementation. We need to recheck labs in 1 month. If they notice any blood in stool they would want to take him to ED.      Romeo Harrington LPN can you also please fax office note and labs to GI Dr. Mo Sewell

## 2022-11-17 NOTE — PROGRESS NOTES
Faxed office notes and labs to [address] [place] [address] (fax# [phone]). Confirmation received.

## 2022-12-06 DIAGNOSIS — D64.9 ANEMIA, UNSPECIFIED TYPE: ICD-10-CM

## 2022-12-07 RX ORDER — FERROUS SULFATE 220 (44)/5
ELIXIR ORAL
Qty: 210 ML | Refills: 0 | Status: SHIPPED | OUTPATIENT
Start: 2022-12-07

## 2023-01-26 DIAGNOSIS — K21.9 GASTROESOPHAGEAL REFLUX DISEASE, UNSPECIFIED WHETHER ESOPHAGITIS PRESENT: ICD-10-CM

## 2023-01-26 RX ORDER — SUCRALFATE 1 G/10ML
SUSPENSION ORAL
Qty: 300 ML | Refills: 4 | Status: SHIPPED | OUTPATIENT
Start: 2023-01-26

## 2023-02-24 DIAGNOSIS — D64.9 ANEMIA, UNSPECIFIED TYPE: ICD-10-CM

## 2023-02-24 RX ORDER — FERROUS SULFATE 220 (44)/5
ELIXIR ORAL
Qty: 210 ML | Refills: 0 | Status: SHIPPED | OUTPATIENT
Start: 2023-02-24

## 2023-03-22 ENCOUNTER — OFFICE VISIT (OUTPATIENT)
Dept: FAMILY MEDICINE CLINIC | Age: 58
End: 2023-03-22

## 2023-03-22 VITALS
TEMPERATURE: 99.8 F | WEIGHT: 92.2 LBS | HEIGHT: 60 IN | BODY MASS INDEX: 18.1 KG/M2 | RESPIRATION RATE: 16 BRPM | SYSTOLIC BLOOD PRESSURE: 103 MMHG | HEART RATE: 114 BPM | DIASTOLIC BLOOD PRESSURE: 71 MMHG | OXYGEN SATURATION: 93 %

## 2023-03-22 DIAGNOSIS — F79 INTELLECTUAL DISABILITY: ICD-10-CM

## 2023-03-22 DIAGNOSIS — Z93.1 GASTROSTOMY TUBE DEPENDENT (HCC): ICD-10-CM

## 2023-03-22 DIAGNOSIS — Z00.00 WELL ADULT EXAM: Primary | ICD-10-CM

## 2023-03-22 DIAGNOSIS — Z79.899 LONG TERM CURRENT USE OF ANTIPSYCHOTIC MEDICATION: ICD-10-CM

## 2023-03-22 DIAGNOSIS — Z99.3 WHEELCHAIR DEPENDENCE: ICD-10-CM

## 2023-03-22 DIAGNOSIS — D50.9 IRON DEFICIENCY ANEMIA, UNSPECIFIED IRON DEFICIENCY ANEMIA TYPE: ICD-10-CM

## 2023-03-22 DIAGNOSIS — G80.9 CEREBRAL PALSY, UNSPECIFIED TYPE (HCC): ICD-10-CM

## 2023-03-22 NOTE — PROGRESS NOTES
Yennifer Day is a 62 y.o. male , id x 2(name and ). Reviewed record, history, and  medications. Chief Complaint   Patient presents with    Physical    Labs    Referral Request     PT and OT. Evaluation for Tilt-space wheelchair and [employer]. Vitals:    23 1039   BP: 103/71   Pulse: (!) 114   Resp: 16   Temp: 99.8 °F (37.7 °C)   TempSrc: Temporal   SpO2: 93%   Weight: 92 lb 3.2 oz (41.8 kg)   Height: 4' 8\" (1.422 m)       Coordination of Care Questionnaire:   1. Have you been to the ER, urgent care clinic since your last visit? Hospitalized since your last visit? No    2. Have you seen or consulted any other health care providers outside of the [address] [place] [address] since your last visit? Include any pap smears or colon screening.  Yes GI Dr. Crow Baig, [facility] NP, [facility]

## 2023-03-25 PROBLEM — Z79.899 LONG TERM CURRENT USE OF ANTIPSYCHOTIC MEDICATION: Status: ACTIVE | Noted: 2023-03-25

## 2023-03-25 PROBLEM — D50.9 IRON DEFICIENCY ANEMIA: Status: ACTIVE | Noted: 2023-03-25

## 2023-03-26 NOTE — PROGRESS NOTES
Subjective:     Riky Moran is a 62 y.o. male presenting for annual exam and complete physical.  Accompanied by group home primary caregiver and nursing director. Caregiver reports Payal Pickering has generally been in good health. Payal Pickering has a history of cerebral palsy and is nonverbal.  He does communicate with hand gestures, smiles in response to questions and greetings from caregivers. Payal Pickering is wheelchair dependent secondary to weakness and contractures from his CP. He is not able to swallow safely, tube feedings and medications are administered via PEG tube. Payal Pickering sees a nutritionist who monitors his weight and general physical status and nurse his feedings accordingly. He is currently on Jevity. He has orders for alternative feeding solutions should his primary be unavailable as there have been some recent supplies shortages. Caregivers would like to pursue getting a [employer]anza space wheelchair and Typemock lift with scale for Payal Pickering. Patient Active Problem List   Diagnosis Code    Cerebral palsy ([address] G80.9    Vitamin D deficiency E55.9    Lives in group home Z59.3    Intellectual disability F79    Gastrostomy tube dependent ([address] Z93.1    Dysphagia R13.10    Wheelchair dependence Z99.3    Iron deficiency anemia D50.9    Long term current use of antipsychotic medication Z79.899     No Known Allergies  Past Medical History:   Diagnosis Date    Anxiety     Cerebral palsy ([address]     Contracted, joint, multiple sites     upper extremities due to cerebral palsy    Developmental non-verbal disorder     Encephalopathy chronic     GERD (gastroesophageal reflux disease)     Hip dysplasia     Ill-defined condition     cerebral palsy    Intellectual disability     Mass of bladder     Microcephaly ([address]     Muscle spasm     upper and lower extremities     Past Surgical History:   Procedure Laterality Date    [place] UNLISTED PROCEDURE ABDOMEN PERITONEUM & OMENTUM      peg     History reviewed. No pertinent family history.   Social History     Tobacco Use    Smoking status: Never    Smokeless tobacco: Never   Substance Use Topics    Alcohol use: Never        Lab Results   Component Value Date/Time    WBC 5.3 11/14/2022 11:00 AM    HGB 11.2 (L) 11/14/2022 11:00 AM    HCT 37.8 11/14/2022 11:00 AM    PLATELET 901 15/63/4468 11:00 AM    MCV 91.5 11/14/2022 11:00 AM          Lab Results   Component Value Date/Time    PLATELET 892 68/73/9700 11:00 AM          Review of Systems  A comprehensive review of systems was negative except for that written in the HPI. Objective:     Visit Vitals  /71 (BP 1 Location: Right upper arm, BP Patient Position: Sitting)   Pulse (!) 114   Temp 99.8 °F (37.7 °C) (Temporal)   Resp 16   Ht 4' 8\" (1.422 m)   Wt 92 lb 3.2 oz (41.8 kg) Comment: pt weighs 114.4 in wheelchair (wheelchair weighs 22.2lbs)   SpO2 93%   BMI 20.67 kg/m²     Physical exam:   General appearance - alert, well appearing, and in no distress  Mental status - alert, responsive to greetings from provider and caregivers, smiling. Reaches for and squeezes caregivers hand. Eyes - pupils equal and reactive, extraocular eye movements intact, sclera anicteric  Ears - bilateral TM's and external ear canals normal  Nose - normal and patent, no erythema, discharge or polyps  Mouth - mucous membranes moist, pharynx normal without lesions  Neck - supple, no significant adenopathy, thyroid exam: thyroid is normal in size without nodules or tenderness  Chest - clear to auscultation, no wheezes, rales or rhonchi, symmetric air entry  Heart - normal rate, regular rhythm, normal S1, S2, no murmurs, rubs, clicks or gallops  Abdomen - soft, nontender, nondistended, no masses or organomegaly. PEG intact, site without erythema or drainage  bowel sounds normal  Neurological - alert, oriented, normal speech, no focal findings or movement disorder noted  Musculoskeletal - no muscular tenderness noted, decreased muscle mass of extremities noted.  Contractures of upper and lower extremities noted  Extremities - no pedal edema noted, intact peripheral pulses  Skin - normal coloration and turgor, no rashes, no suspicious skin lesions noted     Assessment/Plan:         Diagnoses and all orders for this visit:    1. Well adult exam  Maintain healthy weight  Maintain adequate cushioning when seated or lying in bed to help prevent pressure injuries    2. Cerebral palsy, unspecified type ([address]  Receiving care and support from group home which appears to meet his current needs  -     AMB SUPPLY ORDER  -     REFERRAL TO PHYSICAL THERAPY    3. Wheelchair dependence  Orders written for tilt space wheelchair and [employer] with scale, transmitted to DME supplier  Spoke with [facility] nursing director who will confirm with DME company that correct equipment has been ordered prior to excepting delivery  -     [facility]    4. Intellectual disability  -     AMB SUPPLY ORDER  -     REFERRAL TO PHYSICAL THERAPY    5. Lives in group home  Living environment appears to be meeting his current needs    6. Gastrostomy tube dependent ([address]  Tube intact with healthy insertion site  Continue Jevity as ordered  Follow-up with nutritionist as scheduled    7. Long term current use of antipsychotic medication  -     HEMOGLOBIN A1C WITH EAG; Future  -     LIPID PANEL; Future  -     METABOLIC PANEL, COMPREHENSIVE; Future    8. Iron deficiency anemia, unspecified iron deficiency anemia type  Repeat CBC, iron profile, ferritin  Continue iron supplement pending results  -     CBC W/O DIFF; Future  -     IRON PROFILE; Future  -     FERRITIN; Future    Follow-up and Dispositions    Return in about 1 year (around 3/22/2024), or if symptoms worsen or fail to improve, for well check. I have discussed the diagnosis with the patient/caregiver and the intended plan as seen in the above orders.  The patient/caregiver has received an after-visit summary and questions were answered concerning future plans. Patient/caregiver conveyed understanding of the plan at the time of the visit.     Antonio Loja NP  3/24/2023

## 2023-04-19 ENCOUNTER — TELEPHONE (OUTPATIENT)
Dept: FAMILY MEDICINE CLINIC | Age: 58
End: 2023-04-19

## 2023-04-21 NOTE — TELEPHONE ENCOUNTER
Faxed wheelchair order to [facility] (fax# [phone]) on 4/19/2023. Confirmation received on 4/19/2023.

## 2023-05-08 ENCOUNTER — CLINICAL DOCUMENTATION (OUTPATIENT)
Age: 58
End: 2023-05-08

## 2023-05-08 ENCOUNTER — TELEMEDICINE (OUTPATIENT)
Age: 58
End: 2023-05-08
Payer: COMMERCIAL

## 2023-05-08 DIAGNOSIS — K21.9 GASTROESOPHAGEAL REFLUX DISEASE WITHOUT ESOPHAGITIS: Primary | ICD-10-CM

## 2023-05-08 DIAGNOSIS — Z93.1 GASTROSTOMY TUBE DEPENDENT (HCC): ICD-10-CM

## 2023-05-08 PROCEDURE — 99213 OFFICE O/P EST LOW 20 MIN: CPT | Performed by: NURSE PRACTITIONER

## 2023-05-08 ASSESSMENT — ENCOUNTER SYMPTOMS
RESPIRATORY NEGATIVE: 1
ALLERGIC/IMMUNOLOGIC NEGATIVE: 1
EYES NEGATIVE: 1

## 2023-05-08 NOTE — PROGRESS NOTES
Faxed med change request form back to [address] [place] [address] Way (fax# [phone]). Confirmation received.

## 2023-05-08 NOTE — PROGRESS NOTES
Adam Hinds is a 62 y.o. male , id x 2(name and ). Reviewed questionnaires, and  medications. Chief Complaint   Patient presents with    Medication Check       1. Have you been to the ER, urgent care clinic since your last visit? Hospitalized since your last visit? No    2. Have you seen or consulted any other health care providers outside of the [address] [place] [address] since your last visit? Include any pap smears or colon screening.  Psychiatrist, GI
extremities     Past Surgical History:   Procedure Laterality Date    [place] UNLISTED PROCEDURE ABDOMEN PERITONEUM & OMENTUM      peg     History reviewed. No pertinent family history. Social History     Tobacco Use    Smoking status: Never    Smokeless tobacco: Never   Substance Use Topics    Alcohol use: Never         Review of Systems   Constitutional: Negative. HENT: Negative. Eyes: Negative. Respiratory: Negative. Cardiovascular: Negative. Endocrine: Negative. Genitourinary: Negative. Musculoskeletal: Negative. Skin: Negative. Allergic/Immunologic: Negative. Neurological:  Positive for weakness. Hematological: Negative. Psychiatric/Behavioral: Negative.           Objective        Constitutional: [x] Appears well-developed and well-nourished [x] No apparent distress      [] Abnormal -     Mental status: [x] Alert and awake  [] Oriented to person/place/time [] Able to follow commands    [] Abnormal -     Eyes:   EOM    []  Normal    [] Abnormal -   Sclera  [x]  Normal    [] Abnormal -          Discharge [x]  None visible   [] Abnormal -     HENT: [x] Normocephalic, atraumatic  [] Abnormal -   [x] Mouth/Throat: Mucous membranes are moist    External Ears [x] Normal  [] Abnormal -    Neck: [x] No visualized mass [] Abnormal -     Pulmonary/Chest: [x] Respiratory effort normal   [x] No visualized signs of difficulty breathing or respiratory distress        [] Abnormal -      Musculoskeletal:   [] Normal gait with no signs of ataxia         [] Normal range of motion of neck        [x] Abnormal - wheelchair bound, CP    Neurological:        [] No Facial Asymmetry (Cranial nerve 7 motor function) (limited exam due to video visit)          [] No gaze palsy        [x] Abnormal -  WC bound, CP with limb contractures and spasticity          Skin:        [x] No significant exanthematous lesions or discoloration noted on facial skin         [] Abnormal -            Psychiatric:       [x]

## 2023-07-05 ENCOUNTER — TELEPHONE (OUTPATIENT)
Age: 58
End: 2023-07-05

## 2023-07-22 LAB
ALBUMIN SERPL-MCNC: 4.7 G/DL (ref 3.8–4.9)
ALBUMIN/GLOB SERPL: 1.7 {RATIO} (ref 1.2–2.2)
ALP SERPL-CCNC: 125 IU/L (ref 44–121)
ALT SERPL-CCNC: 37 IU/L (ref 0–44)
AST SERPL-CCNC: 31 IU/L (ref 0–40)
BILIRUB SERPL-MCNC: 0.4 MG/DL (ref 0–1.2)
BUN SERPL-MCNC: 14 MG/DL (ref 6–24)
BUN/CREAT SERPL: 20 (ref 9–20)
CALCIUM SERPL-MCNC: 9.7 MG/DL (ref 8.7–10.2)
CHLORIDE SERPL-SCNC: 100 MMOL/L (ref 96–106)
CHOLEST SERPL-MCNC: 157 MG/DL (ref 100–199)
CO2 SERPL-SCNC: 19 MMOL/L (ref 20–29)
CREAT SERPL-MCNC: 0.69 MG/DL (ref 0.76–1.27)
EGFRCR SERPLBLD CKD-EPI 2021: 108 ML/MIN/1.73
ERYTHROCYTE [DISTWIDTH] IN BLOOD BY AUTOMATED COUNT: 13.1 % (ref 11.6–15.4)
EST. AVERAGE GLUCOSE BLD GHB EST-MCNC: 94 MG/DL
FERRITIN SERPL-MCNC: 46 NG/ML (ref 30–400)
GLOBULIN SER CALC-MCNC: 2.8 G/DL (ref 1.5–4.5)
GLUCOSE SERPL-MCNC: 91 MG/DL (ref 70–99)
HBA1C MFR BLD: 4.9 % (ref 4.8–5.6)
HCT VFR BLD AUTO: 52.3 % (ref 37.5–51)
HDLC SERPL-MCNC: 40 MG/DL
HGB BLD-MCNC: 18.3 G/DL (ref 13–17.7)
IMP & REVIEW OF LAB RESULTS: NORMAL
IRON SATN MFR SERPL: 20 % (ref 15–55)
IRON SERPL-MCNC: 83 UG/DL (ref 38–169)
LDLC SERPL CALC-MCNC: 80 MG/DL (ref 0–99)
MCH RBC QN AUTO: 33.5 PG (ref 26.6–33)
MCHC RBC AUTO-ENTMCNC: 35 G/DL (ref 31.5–35.7)
MCV RBC AUTO: 96 FL (ref 79–97)
PLATELET # BLD AUTO: 216 X10E3/UL (ref 150–450)
POTASSIUM SERPL-SCNC: 4.5 MMOL/L (ref 3.5–5.2)
PROT SERPL-MCNC: 7.5 G/DL (ref 6–8.5)
RBC # BLD AUTO: 5.46 X10E6/UL (ref 4.14–5.8)
SODIUM SERPL-SCNC: 140 MMOL/L (ref 134–144)
TIBC SERPL-MCNC: 409 UG/DL (ref 250–450)
TRIGL SERPL-MCNC: 221 MG/DL (ref 0–149)
UIBC SERPL-MCNC: 326 UG/DL (ref 111–343)
VLDLC SERPL CALC-MCNC: 37 MG/DL (ref 5–40)
WBC # BLD AUTO: 6.1 X10E3/UL (ref 3.4–10.8)

## 2023-07-31 ENCOUNTER — CLINICAL DOCUMENTATION (OUTPATIENT)
Age: 58
End: 2023-07-31

## 2023-08-02 ENCOUNTER — CLINICAL DOCUMENTATION (OUTPATIENT)
Age: 58
End: 2023-08-02

## 2023-08-14 ENCOUNTER — CLINICAL DOCUMENTATION (OUTPATIENT)
Age: 58
End: 2023-08-14

## 2023-08-15 ENCOUNTER — CLINICAL DOCUMENTATION (OUTPATIENT)
Age: 58
End: 2023-08-15

## 2023-08-15 NOTE — PROGRESS NOTES
Home Care Delivered CMN for durable medical was faxed to [phone],ok,Copy placed in scan folder to be scanned to chart.

## 2023-08-17 ENCOUNTER — TELEPHONE (OUTPATIENT)
Age: 58
End: 2023-08-17

## 2023-08-17 NOTE — TELEPHONE ENCOUNTER
Moni from [address] states that insurance does not cover ipratropium nasal spray but does cover generic azelastine nasal spray.  Please change

## 2023-08-18 RX ORDER — AZELASTINE 1 MG/ML
2 SPRAY, METERED NASAL 2 TIMES DAILY
Qty: 120 ML | Refills: 1 | Status: SHIPPED | OUTPATIENT
Start: 2023-08-18

## 2023-08-29 ENCOUNTER — CLINICAL DOCUMENTATION (OUTPATIENT)
Age: 58
End: 2023-08-29

## 2023-08-29 NOTE — PROGRESS NOTES
[facility] / [employer] medication review was put on CHANO Haq's desk to process

## 2023-08-31 NOTE — PROGRESS NOTES
[facility] / [employer] medication review was signed & faxed to [phone], ok, copy placed in scan folder to be scanned into chart.

## 2023-09-12 ENCOUNTER — TELEPHONE (OUTPATIENT)
Age: 58
End: 2023-09-12

## 2023-09-12 NOTE — TELEPHONE ENCOUNTER
We received a fax refill request for [facility]. Please escribe Loratadine Allergy to their pharmacy. The pharmacy is correct in the chart and they are requesting a ? day supply.

## 2023-09-13 RX ORDER — LORATADINE 10 MG/1
10 TABLET ORAL DAILY
Qty: 30 TABLET | Refills: 5 | Status: SHIPPED | OUTPATIENT
Start: 2023-09-13

## 2023-09-22 RX ORDER — BACLOFEN 10 MG/1
TABLET ORAL
Qty: 93 TABLET | Refills: 0 | Status: SHIPPED | OUTPATIENT
Start: 2023-09-22

## 2023-10-03 ENCOUNTER — CLINICAL DOCUMENTATION (OUTPATIENT)
Age: 58
End: 2023-10-03

## 2023-10-03 ENCOUNTER — TELEPHONE (OUTPATIENT)
Age: 58
End: 2023-10-03

## 2023-10-03 RX ORDER — SUCRALFATE ORAL 1 G/10ML
SUSPENSION ORAL
Qty: 1200 ML | Refills: 5 | Status: SHIPPED | OUTPATIENT
Start: 2023-10-03

## 2023-10-03 NOTE — TELEPHONE ENCOUNTER
We received a fax refill request for [facility]. Please escribe Sucralafate to their pharmacy. The pharmacy is correct in the chart and they are requesting a ? day supply.

## 2023-10-03 NOTE — TELEPHONE ENCOUNTER
----- Message from [facility] sent at 10/3/2023 10:02 AM EDT -----  Subject: Refill Request    QUESTIONS  Name of Medication? sucralfate (CARAFATE) 1 GM/10ML suspension  Patient-reported dosage and instructions? 1  How many days do you have left? 4  Preferred Pharmacy? [address] phone number (if available)? [phone]  Additional Information for Provider? scheduled virtual med f/u appt   ---------------------------------------------------------------------------  --------------  CALL BACK INFO  What is the best way for the office to contact you? OK to leave message on   voicemail  Preferred Call Back Phone Number? [phone]  ---------------------------------------------------------------------------  --------------  SCRIPT ANSWERS  Relationship to Patient? Covered Entity  Covered Entity Type? Home Health Care?   Representative Name? Regan Bui

## 2023-10-10 ENCOUNTER — CLINICAL DOCUMENTATION (OUTPATIENT)
Age: 58
End: 2023-10-10

## 2023-10-16 ENCOUNTER — TELEMEDICINE (OUTPATIENT)
Age: 58
End: 2023-10-16
Payer: COMMERCIAL

## 2023-10-16 DIAGNOSIS — G80.0 SPASTIC QUADRIPLEGIC CEREBRAL PALSY (HCC): Primary | ICD-10-CM

## 2023-10-16 DIAGNOSIS — R32 URINARY INCONTINENCE, UNSPECIFIED TYPE: ICD-10-CM

## 2023-10-16 PROCEDURE — 99213 OFFICE O/P EST LOW 20 MIN: CPT | Performed by: NURSE PRACTITIONER

## 2023-10-16 ASSESSMENT — ENCOUNTER SYMPTOMS
GASTROINTESTINAL NEGATIVE: 1
EYES NEGATIVE: 1
RESPIRATORY NEGATIVE: 1
ALLERGIC/IMMUNOLOGIC NEGATIVE: 1

## 2023-10-16 NOTE — PROGRESS NOTES
Janett Bhatt is a 62 y.o. male , id x 2(name and ). Reviewed questionnaires, and  medications. Chief Complaint   Patient presents with    Medication Refill     Possibly sucralfate      This patient is accompanied in the office by his nurse Tanesha Foster. I have received verbal consent from Janett Bhatt to discuss any/all medical information while they are present in the room. 1. Have you been to the ER, urgent care clinic since your last visit? Hospitalized since your last visit? No    2. Have you seen or consulted any other health care providers outside of the [address] [place] [address] since your last visit? Include any pap smears or colon screening.  No
atraumatic  [] Abnormal -   [x] Mouth/Throat: Mucous membranes are moist    External Ears [x] Normal  [] Abnormal -    Neck: [x] No visualized mass [] Abnormal -     Pulmonary/Chest: [x] Respiratory effort normal   [x] No visualized signs of difficulty breathing or respiratory distress        [] Abnormal -      Musculoskeletal:   [] Normal gait with no signs of ataxia         [] Normal range of motion of neck        [x] Abnormal - contractures of upper extremities, wheelchair dependant    Neurological:        [x] No Facial Asymmetry (Cranial nerve 7 motor function) (limited exam due to video visit)          [] No gaze palsy        [] Abnormal -          Skin:        [x] No significant exanthematous lesions or discoloration noted on facial skin         [] Abnormal -            Psychiatric:       [x] Normal Affect [] Abnormal -        [x] No Hallucinations    Other pertinent observable physical exam findings:-  none           --SKYLER Aldana - NP   10/16/2023

## 2023-11-09 ENCOUNTER — CLINICAL DOCUMENTATION (OUTPATIENT)
Age: 58
End: 2023-11-09

## 2023-11-14 ENCOUNTER — CLINICAL DOCUMENTATION (OUTPATIENT)
Age: 58
End: 2023-11-14

## 2023-11-14 ENCOUNTER — TELEMEDICINE (OUTPATIENT)
Age: 58
End: 2023-11-14
Payer: COMMERCIAL

## 2023-11-14 DIAGNOSIS — J01.10 ACUTE NON-RECURRENT FRONTAL SINUSITIS: Primary | ICD-10-CM

## 2023-11-14 PROCEDURE — 99213 OFFICE O/P EST LOW 20 MIN: CPT | Performed by: NURSE PRACTITIONER

## 2023-11-14 RX ORDER — AMOXICILLIN AND CLAVULANATE POTASSIUM 250; 62.5 MG/5ML; MG/5ML
35 POWDER, FOR SUSPENSION ORAL 2 TIMES DAILY
Qty: 330 ML | Refills: 0 | Status: SHIPPED | OUTPATIENT
Start: 2023-11-14 | End: 2023-11-24

## 2023-11-14 RX ORDER — GUAIFENESIN/DEXTROMETHORPHAN 100-10MG/5
10 SYRUP ORAL 3 TIMES DAILY PRN
Qty: 300 ML | Refills: 0 | Status: SHIPPED | OUTPATIENT
Start: 2023-11-14 | End: 2023-11-24

## 2023-11-14 ASSESSMENT — ENCOUNTER SYMPTOMS
COUGH: 1
WHEEZING: 0
GASTROINTESTINAL NEGATIVE: 1
EYES NEGATIVE: 1
ALLERGIC/IMMUNOLOGIC NEGATIVE: 1
RHINORRHEA: 1

## 2023-11-14 ASSESSMENT — PATIENT HEALTH QUESTIONNAIRE - PHQ9: DEPRESSION UNABLE TO ASSESS: FUNCTIONAL CAPACITY MOTIVATION LIMITS ACCURACY

## 2023-11-14 NOTE — PROGRESS NOTES
Physician Order Forms faxed to: [address] [place] [address]. [phone]. Confirmation received.

## 2023-11-14 NOTE — PROGRESS NOTES
Samuel Layton, was evaluated through a synchronous (real-time) audio-video encounter. The patient (or guardian if applicable) is aware that this is a billable service, which includes applicable co-pays. This Virtual Visit was conducted with patient's (and/or legal guardian's) consent. Patient identification was verified, and a caregiver was present when appropriate. The patient was located at Home: Jaxon Santizo  [address] [place] [address]  Provider was located at [facility] ([address]): [place] (:  1965) is a Established patient, presenting virtually for evaluation of the following:    Assessment & Plan   Below is the assessment and plan developed based on review of pertinent history, physical exam, labs, studies, and medications. 1. Acute non-recurrent frontal sinusitis  Add antibiotic  Add expectorant/cough suppressant prn  Reviewed s/s of pneumonia, including fever, chills, wheezing or increased work of breathing, respiratory distress, lethargy, staff to take Therman Latter-day to ED if occurs for further evaluation. -     amoxicillin-clavulanate (AUGMENTIN) 250-62.5 MG/5ML suspension; 16.5 mLs by Per G Tube route 2 times daily for 10 days, Disp-330 mL, R-0Normal  -     guaiFENesin-dextromethorphan (ROBITUSSIN DM) 100-10 MG/5ML syrup; Take 10 mLs by mouth 3 times daily as needed for Cough, Disp-300 mL, R-0Normal    Return if symptoms worsen or fail to improve. Subjective   Presents for evaluation of congestion, cough. Seen with caregiver Viola Peng, hx obtained from caregiver. Caregiver reports 5 day hx of nasal congestion with purulent rhinorrhea, wet cough, gradually worsening. No fever or chills. No wheezing or shortness of breath. No increased work of breathing noted. LPN at the group home listened to his lungs and noted they were clear. Home covid test was negative. No nausea, vomiting, or diarrhea.        Past Medical History:   Diagnosis Date    Anxiety     Cerebral palsy ([address])

## 2023-12-28 ENCOUNTER — TELEPHONE (OUTPATIENT)
Age: 58
End: 2023-12-28

## 2023-12-28 ENCOUNTER — CLINICAL DOCUMENTATION (OUTPATIENT)
Age: 58
End: 2023-12-28

## 2023-12-28 NOTE — TELEPHONE ENCOUNTER
Tamara Ames needs a refill of glycopyrrolate (CUVPOSA) 1 MG/5ML SOLN solution . They have ? pills/supply left and are requesting a ? day supply with refills. Pharmacy has been updated in the chart. Patient was advised or scheduled an appointment for the future and to request refills thru the [facility] or by requesting a refill from their pharmacy in the future. Patient was also advised to check with their pharmacy for status of when refills are available.

## 2023-12-29 RX ORDER — BACLOFEN 10 MG/1
TABLET ORAL
Qty: 93 TABLET | Refills: 0 | Status: SHIPPED | OUTPATIENT
Start: 2023-12-29

## 2023-12-29 RX ORDER — GLYCOPYRROLATE 1 MG/5ML
SOLUTION ORAL
Qty: 1350 ML | Refills: 2 | Status: SHIPPED | OUTPATIENT
Start: 2023-12-29

## 2024-01-10 RX ORDER — RISPERIDONE 1 MG/ML
SOLUTION ORAL
Qty: 90 ML | Refills: 0 | Status: SHIPPED | OUTPATIENT
Start: 2024-01-10

## 2024-01-31 RX ORDER — CHOLECALCIFEROL (VITAMIN D3) 50 MCG
TABLET ORAL
Qty: 29 TABLET | Refills: 5 | Status: SHIPPED | OUTPATIENT
Start: 2024-01-31

## 2024-02-13 ENCOUNTER — TELEPHONE (OUTPATIENT)
Age: 59
End: 2024-02-13

## 2024-02-13 NOTE — TELEPHONE ENCOUNTER
----- Message from Jojo Meng sent at 2/13/2024  3:19 PM EST -----  Subject: Hospital Follow Up    QUESTIONS  What hospital was the Patient Discharged from? [facility]   Date of Discharge? 2024-02-11  Discharge Location? Home  Reason for hospitalization as patient stated? severe constipation & Gerd   What question does the patient have, if applicable?   ---------------------------------------------------------------------------  --------------  CALL BACK INFO  What is the best way for the office to contact you? OK to leave message on   voicemail  Preferred Call Back Phone Number? [phone]  ---------------------------------------------------------------------------  --------------  SCRIPT ANSWERS  Relationship to Patient? Covered Entity  Covered Entity Type? Hospital?  Representative Name? Mitzi

## 2024-02-19 ENCOUNTER — CLINICAL DOCUMENTATION (OUTPATIENT)
Age: 59
End: 2024-02-19

## 2024-02-28 ENCOUNTER — CLINICAL DOCUMENTATION (OUTPATIENT)
Age: 59
End: 2024-02-28

## 2024-02-28 NOTE — PROGRESS NOTES
LVM for caregiver to [facility] to notify that today's appt needs to be r/s d/t provider illness.~ RCF 2/28/24

## 2024-03-04 ENCOUNTER — CLINICAL DOCUMENTATION (OUTPATIENT)
Age: 59
End: 2024-03-04

## 2024-03-04 NOTE — PROGRESS NOTES
[facility] home health certification & POC were put on CHANO Haq's desk for additional information

## 2024-03-07 ENCOUNTER — CLINICAL DOCUMENTATION (OUTPATIENT)
Age: 59
End: 2024-03-07

## 2024-03-27 ENCOUNTER — CLINICAL DOCUMENTATION (OUTPATIENT)
Age: 59
End: 2024-03-27

## 2024-03-27 NOTE — PROGRESS NOTES
Attempted to LVM for pt/ caregiver to RC to notify that todays appt needs to be r/s d/t provider illness. YI full. ~ RCF 3/27/24

## 2024-04-01 RX ORDER — GLYCOPYRROLATE 1 MG/5ML
SOLUTION ORAL
Qty: 150 ML | Refills: 5 | Status: SHIPPED | OUTPATIENT
Start: 2024-04-01

## 2024-06-12 ENCOUNTER — APPOINTMENT (OUTPATIENT)
Facility: HOSPITAL | Age: 59
End: 2024-06-12
Payer: MEDICAID

## 2024-06-12 ENCOUNTER — HOSPITAL ENCOUNTER (INPATIENT)
Facility: HOSPITAL | Age: 59
LOS: 16 days | Discharge: HOME | End: 2024-06-28
Attending: EMERGENCY MEDICINE | Admitting: HOSPITALIST
Payer: MEDICAID

## 2024-06-12 DIAGNOSIS — J18.9 PNEUMONIA OF LEFT LUNG DUE TO INFECTIOUS ORGANISM, UNSPECIFIED PART OF LUNG: Primary | ICD-10-CM

## 2024-06-12 DIAGNOSIS — Z86.69 H/O CEREBRAL PALSY: ICD-10-CM

## 2024-06-12 DIAGNOSIS — R65.20 SEVERE SEPSIS (HCC): ICD-10-CM

## 2024-06-12 DIAGNOSIS — A41.9 SEVERE SEPSIS (HCC): ICD-10-CM

## 2024-06-12 LAB
ALBUMIN SERPL-MCNC: 4 G/DL (ref 3.5–5.2)
ALBUMIN/GLOB SERPL: 1.2 (ref 1.1–2.2)
ALP SERPL-CCNC: 135 U/L (ref 40–129)
ALT SERPL-CCNC: 75 U/L (ref 10–50)
ANION GAP SERPL CALC-SCNC: 15 MMOL/L (ref 5–15)
ARTERIAL PATENCY WRIST A: YES
AST SERPL-CCNC: 49 U/L (ref 10–50)
BASE DEFICIT BLDA-SCNC: 1.6 MMOL/L
BASOPHILS # BLD: 0 K/UL (ref 0–0.1)
BASOPHILS NFR BLD: 0 % (ref 0–1)
BDY SITE: ABNORMAL
BILIRUB SERPL-MCNC: 0.7 MG/DL (ref 0.2–1)
BUN SERPL-MCNC: 26 MG/DL (ref 6–20)
BUN/CREAT SERPL: 28 (ref 12–20)
CALCIUM SERPL-MCNC: 9.5 MG/DL (ref 8.6–10)
CHLORIDE SERPL-SCNC: 105 MMOL/L (ref 98–107)
CO2 SERPL-SCNC: 22 MMOL/L (ref 22–29)
CREAT SERPL-MCNC: 0.92 MG/DL (ref 0.7–1.2)
DIFFERENTIAL METHOD BLD: ABNORMAL
EOSINOPHIL # BLD: 0 K/UL (ref 0–0.4)
EOSINOPHIL NFR BLD: 0 % (ref 0–7)
ERYTHROCYTE [DISTWIDTH] IN BLOOD BY AUTOMATED COUNT: 13 % (ref 11.5–14.5)
FLUAV RNA SPEC QL NAA+PROBE: NOT DETECTED
FLUBV RNA SPEC QL NAA+PROBE: NOT DETECTED
GAS FLOW.O2 O2 DELIVERY SYS: 4.5 L/MIN
GLOBULIN SER CALC-MCNC: 3.4 G/DL (ref 2–4)
GLUCOSE SERPL-MCNC: 162 MG/DL (ref 65–100)
HCO3 BLDA-SCNC: 22 MMOL/L (ref 22–26)
HCT VFR BLD AUTO: 55 % (ref 36.6–50.3)
HGB BLD-MCNC: 18.6 G/DL (ref 12.1–17)
IMM GRANULOCYTES # BLD AUTO: 0 K/UL
IMM GRANULOCYTES NFR BLD AUTO: 0 %
LACTATE BLD-SCNC: 2.17 MMOL/L (ref 0.4–2)
LACTATE BLD-SCNC: 2.43 MMOL/L (ref 0.4–2)
LYMPHOCYTES # BLD: 1.2 K/UL (ref 0.8–3.5)
LYMPHOCYTES NFR BLD: 4 % (ref 12–49)
MAGNESIUM SERPL-MCNC: 2.4 MG/DL (ref 1.6–2.6)
MCH RBC QN AUTO: 32.2 PG (ref 26–34)
MCHC RBC AUTO-ENTMCNC: 33.8 G/DL (ref 30–36.5)
MCV RBC AUTO: 95.3 FL (ref 80–99)
MONOCYTES # BLD: 0.6 K/UL (ref 0–1)
MONOCYTES NFR BLD: 2 % (ref 5–13)
NEUTS BAND NFR BLD MANUAL: 11 % (ref 0–6)
NEUTS SEG # BLD: 27.7 K/UL (ref 1.8–8)
NEUTS SEG NFR BLD: 83 % (ref 32–75)
NRBC # BLD: 0 K/UL (ref 0–0.01)
NRBC BLD-RTO: 0 PER 100 WBC
PCO2 BLDA: 33 MMHG (ref 35–45)
PH BLDA: 7.44 (ref 7.35–7.45)
PLATELET # BLD AUTO: 301 K/UL (ref 150–400)
PMV BLD AUTO: 9.8 FL (ref 8.9–12.9)
PO2 BLDA: 101 MMHG (ref 80–100)
POTASSIUM SERPL-SCNC: 5.2 MMOL/L (ref 3.5–5.1)
PROCALCITONIN SERPL-MCNC: 3.21 NG/ML
PROT SERPL-MCNC: 7.4 G/DL (ref 6.4–8.3)
RBC # BLD AUTO: 5.77 M/UL (ref 4.1–5.7)
RBC MORPH BLD: ABNORMAL
SAO2 % BLD: 98 % (ref 92–97)
SAO2% DEVICE SAO2% SENSOR NAME: ABNORMAL
SARS-COV-2 RNA RESP QL NAA+PROBE: NOT DETECTED
SODIUM SERPL-SCNC: 142 MMOL/L (ref 136–145)
SPECIMEN SITE: ABNORMAL
WBC # BLD AUTO: 29.5 K/UL (ref 4.1–11.1)
WBC MORPH BLD: ABNORMAL

## 2024-06-12 PROCEDURE — 96375 TX/PRO/DX INJ NEW DRUG ADDON: CPT

## 2024-06-12 PROCEDURE — 2580000003 HC RX 258: Performed by: EMERGENCY MEDICINE

## 2024-06-12 PROCEDURE — 99285 EMERGENCY DEPT VISIT HI MDM: CPT

## 2024-06-12 PROCEDURE — 6370000000 HC RX 637 (ALT 250 FOR IP): Performed by: EMERGENCY MEDICINE

## 2024-06-12 PROCEDURE — 71045 X-RAY EXAM CHEST 1 VIEW: CPT

## 2024-06-12 PROCEDURE — 2580000003 HC RX 258: Performed by: HOSPITALIST

## 2024-06-12 PROCEDURE — 87636 SARSCOV2 & INF A&B AMP PRB: CPT

## 2024-06-12 PROCEDURE — 6360000002 HC RX W HCPCS: Performed by: EMERGENCY MEDICINE

## 2024-06-12 PROCEDURE — 83605 ASSAY OF LACTIC ACID: CPT

## 2024-06-12 PROCEDURE — 83735 ASSAY OF MAGNESIUM: CPT

## 2024-06-12 PROCEDURE — 36415 COLL VENOUS BLD VENIPUNCTURE: CPT

## 2024-06-12 PROCEDURE — 2700000000 HC OXYGEN THERAPY PER DAY

## 2024-06-12 PROCEDURE — 82803 BLOOD GASES ANY COMBINATION: CPT

## 2024-06-12 PROCEDURE — 87040 BLOOD CULTURE FOR BACTERIA: CPT

## 2024-06-12 PROCEDURE — 6360000002 HC RX W HCPCS: Performed by: HOSPITALIST

## 2024-06-12 PROCEDURE — 93005 ELECTROCARDIOGRAM TRACING: CPT | Performed by: EMERGENCY MEDICINE

## 2024-06-12 PROCEDURE — 84145 PROCALCITONIN (PCT): CPT

## 2024-06-12 PROCEDURE — 96365 THER/PROPH/DIAG IV INF INIT: CPT

## 2024-06-12 PROCEDURE — 36600 WITHDRAWAL OF ARTERIAL BLOOD: CPT

## 2024-06-12 PROCEDURE — 80053 COMPREHEN METABOLIC PANEL: CPT

## 2024-06-12 PROCEDURE — 85025 COMPLETE CBC W/AUTO DIFF WBC: CPT

## 2024-06-12 PROCEDURE — 1100000000 HC RM PRIVATE

## 2024-06-12 RX ORDER — BACLOFEN 10 MG/1
10 TABLET ORAL 3 TIMES DAILY
Status: DISCONTINUED | OUTPATIENT
Start: 2024-06-12 | End: 2024-06-28 | Stop reason: HOSPADM

## 2024-06-12 RX ORDER — GLYCOPYRROLATE 1 MG/1
1 TABLET ORAL 3 TIMES DAILY
Status: DISCONTINUED | OUTPATIENT
Start: 2024-06-12 | End: 2024-06-28 | Stop reason: HOSPADM

## 2024-06-12 RX ORDER — ACETAMINOPHEN 650 MG/1
650 SUPPOSITORY RECTAL EVERY 6 HOURS PRN
Status: DISCONTINUED | OUTPATIENT
Start: 2024-06-12 | End: 2024-06-19

## 2024-06-12 RX ORDER — SODIUM CHLORIDE 9 MG/ML
INJECTION, SOLUTION INTRAVENOUS CONTINUOUS
Status: DISCONTINUED | OUTPATIENT
Start: 2024-06-12 | End: 2024-06-13

## 2024-06-12 RX ORDER — IPRATROPIUM BROMIDE 21 UG/1
2 SPRAY, METERED NASAL 2 TIMES DAILY
Status: DISCONTINUED | OUTPATIENT
Start: 2024-06-12 | End: 2024-06-28 | Stop reason: HOSPADM

## 2024-06-12 RX ORDER — SODIUM CHLORIDE 0.9 % (FLUSH) 0.9 %
5-40 SYRINGE (ML) INJECTION PRN
Status: DISCONTINUED | OUTPATIENT
Start: 2024-06-12 | End: 2024-06-28 | Stop reason: HOSPADM

## 2024-06-12 RX ORDER — ENOXAPARIN SODIUM 100 MG/ML
30 INJECTION SUBCUTANEOUS DAILY
Status: DISCONTINUED | OUTPATIENT
Start: 2024-06-13 | End: 2024-06-24

## 2024-06-12 RX ORDER — SODIUM CHLORIDE 9 MG/ML
INJECTION, SOLUTION INTRAVENOUS PRN
Status: DISCONTINUED | OUTPATIENT
Start: 2024-06-12 | End: 2024-06-28 | Stop reason: HOSPADM

## 2024-06-12 RX ORDER — AZELASTINE 1 MG/ML
2 SPRAY, METERED NASAL 2 TIMES DAILY
Status: DISCONTINUED | OUTPATIENT
Start: 2024-06-12 | End: 2024-06-28 | Stop reason: HOSPADM

## 2024-06-12 RX ORDER — SODIUM CHLORIDE 0.9 % (FLUSH) 0.9 %
5-40 SYRINGE (ML) INJECTION EVERY 12 HOURS SCHEDULED
Status: DISCONTINUED | OUTPATIENT
Start: 2024-06-12 | End: 2024-06-28 | Stop reason: HOSPADM

## 2024-06-12 RX ORDER — 0.9 % SODIUM CHLORIDE 0.9 %
500 INTRAVENOUS SOLUTION INTRAVENOUS ONCE
Status: COMPLETED | OUTPATIENT
Start: 2024-06-12 | End: 2024-06-12

## 2024-06-12 RX ORDER — KETOROLAC TROMETHAMINE 30 MG/ML
15 INJECTION, SOLUTION INTRAMUSCULAR; INTRAVENOUS ONCE
Status: COMPLETED | OUTPATIENT
Start: 2024-06-12 | End: 2024-06-12

## 2024-06-12 RX ORDER — RISPERIDONE 1 MG/ML
1 SOLUTION ORAL 3 TIMES DAILY
Status: DISCONTINUED | OUTPATIENT
Start: 2024-06-12 | End: 2024-06-28 | Stop reason: HOSPADM

## 2024-06-12 RX ORDER — GLYCOPYRROLATE 1 MG/5ML
0.02 SOLUTION ORAL 3 TIMES DAILY
Status: DISCONTINUED | OUTPATIENT
Start: 2024-06-12 | End: 2024-06-12

## 2024-06-12 RX ORDER — ACETAMINOPHEN 650 MG/1
650 SUPPOSITORY RECTAL
Status: COMPLETED | OUTPATIENT
Start: 2024-06-12 | End: 2024-06-12

## 2024-06-12 RX ORDER — IPRATROPIUM BROMIDE AND ALBUTEROL SULFATE 2.5; .5 MG/3ML; MG/3ML
1 SOLUTION RESPIRATORY (INHALATION)
Status: COMPLETED | OUTPATIENT
Start: 2024-06-12 | End: 2024-06-12

## 2024-06-12 RX ORDER — RISPERIDONE 1 MG/ML
1 SOLUTION ORAL EVERY 8 HOURS PRN
Status: DISCONTINUED | OUTPATIENT
Start: 2024-06-12 | End: 2024-06-28 | Stop reason: HOSPADM

## 2024-06-12 RX ORDER — ACETAMINOPHEN 325 MG/1
650 TABLET ORAL EVERY 6 HOURS PRN
Status: DISCONTINUED | OUTPATIENT
Start: 2024-06-12 | End: 2024-06-19

## 2024-06-12 RX ORDER — 0.9 % SODIUM CHLORIDE 0.9 %
1000 INTRAVENOUS SOLUTION INTRAVENOUS ONCE
Status: COMPLETED | OUTPATIENT
Start: 2024-06-12 | End: 2024-06-12

## 2024-06-12 RX ORDER — 0.9 % SODIUM CHLORIDE 0.9 %
30 INTRAVENOUS SOLUTION INTRAVENOUS ONCE
Status: DISCONTINUED | OUTPATIENT
Start: 2024-06-12 | End: 2024-06-12

## 2024-06-12 RX ADMIN — AZITHROMYCIN MONOHYDRATE 500 MG: 500 INJECTION, POWDER, LYOPHILIZED, FOR SOLUTION INTRAVENOUS at 16:48

## 2024-06-12 RX ADMIN — ACETAMINOPHEN 650 MG: 650 SUPPOSITORY RECTAL at 16:41

## 2024-06-12 RX ADMIN — WATER 80 MG: 1 INJECTION INTRAMUSCULAR; INTRAVENOUS; SUBCUTANEOUS at 20:32

## 2024-06-12 RX ADMIN — SODIUM CHLORIDE 500 ML: 9 INJECTION, SOLUTION INTRAVENOUS at 20:40

## 2024-06-12 RX ADMIN — SODIUM CHLORIDE, PRESERVATIVE FREE 10 ML: 5 INJECTION INTRAVENOUS at 20:44

## 2024-06-12 RX ADMIN — SODIUM CHLORIDE 1000 ML: 9 INJECTION, SOLUTION INTRAVENOUS at 16:33

## 2024-06-12 RX ADMIN — KETOROLAC TROMETHAMINE 15 MG: 30 INJECTION, SOLUTION INTRAMUSCULAR at 20:32

## 2024-06-12 RX ADMIN — IPRATROPIUM BROMIDE AND ALBUTEROL SULFATE 1 DOSE: .5; 3 SOLUTION RESPIRATORY (INHALATION) at 16:44

## 2024-06-12 RX ADMIN — WATER 1000 MG: 1 INJECTION INTRAMUSCULAR; INTRAVENOUS; SUBCUTANEOUS at 16:39

## 2024-06-12 NOTE — ED NOTES
TRANSFER - OUT REPORT:    Verbal report given to Jennifer Bowen RN and Hospital to Home transport on Ayden Simpson  being transferred to [facility] bed 401 for routine progression of patient care       Report consisted of patient's Situation, Background, Assessment and   Recommendations(SBAR).     Information from the following report(s) Nurse Handoff Report, ED Encounter Summary, ED SBAR, MAR, Recent Results, and Cardiac Rhythm sinus  was reviewed with the receiving nurse.    Kyle Fall Assessment:                           Lines:   Peripheral IV 06/12/24 Left;Posterior Hand (Active)        Opportunity for questions and clarification was provided.      Patient transported with:  Monitor and O2 @ 4lpm

## 2024-06-12 NOTE — H&P
Hospitalist Admission Note      NAME:  Ayden Simpson   :  1965   MRN:  [id number]     Date/Time:  2024 7:46 PM    Patient PCP: Lizzie Durham, SKYLER - CNP    ________________________________________________________________________    Given the patient's current clinical presentation, I have a high level of concern for decompensation if discharged from the emergency department.  Complex decision making was performed, which includes reviewing the patient's available past medical records, laboratory results, and x-ray films.       My assessment of this patient's clinical condition and my plan of care is as follows.    Assessment / Plan:  Patient is a 58-year-old male with history of cerebral palsy comes to the hospital with acute hypoxemic respiratory failure, sepsis due to left lower lobe pneumonia.  Patient is admitted for IV antibiotics.    1.  Acute hypoxemic respiratory failure  Due to pneumonia.  Start IV antibiotics  Follow blood cultures  CTA chest   ABG, Respiratory panel  IV steroids    2.  Sepsis  Due to left lower lobe pneumonia.  Start IV fluids and monitor lactic acid closely.  IV antibiotics, blood culture    3.  Cerebral palsy  Patient is on baclofen, glycopyrrolate, hydroxyzine, loratadine, risperidone    4.  Hyperkalemia  IV fluids, avoid hypoxia    5.  GERD  Continue PPI and sucralfate.           I have personally reviewed the radiographs, laboratory data in Epic and decisions and statements above are based partially on this personal interpretation.    Code Status: Full Code  DVT Prophylaxis: Lovenox  GI Prophylaxis: PPI    POC s/w caregiver at bedside. He is a full code.      Subjective:   CHIEF COMPLAINT: \"Respiratory distress\"    HISTORY OF PRESENT ILLNESS:     Ayden is a 58 y.o. male with a history of cerebral palsy, bladder mass, GERD comes to the ER with chief complaint of upper respiratory infection and respiratory distress.  Patient currently lives in a group home and patient is  bleeding  Genitourinary: negative for frequency, urgency, dysuria, hematuria, incontinence  Muskuloskeletal : negative for arthralgia, myalgia  Hematology: negative for easy bruising, bleeding, lymphadenopathy  Dermatological: negative for rash, ulceration, mole change, new lesion  Endocrine: negative for hot flashes or polydipsia  Neurological: negative for headache, dizziness, confusion, focal weakness, paresthesia, memory loss, gait disturbance  Psychological: Positive for confusion      Objective:   VITALS:    Vitals:    06/12/24 1935   BP: 116/71   Pulse: (!) 116   Resp:    Temp: 98.8 °F (37.1 °C)   SpO2: 94%     PHYSICAL EXAM:    Physical Exam:    Gen: Well-developed, well-nourished, in acute distress  HEENT:  Pink conjunctivae, PERRL, hearing intact to voice, moist mucous membranes  Neck: Supple, without masses, thyroid non-tender  Resp: Bilateral air entry left lung crackles positive, tachypneic.  Card: No murmurs, normal S1, S2 without thrills, bruits or peripheral edema  Abd:  Soft, non-tender, non-distended, normoactive bowel sounds are present, no palpable organomegaly and no detectable hernias  Lymph:  No cervical or inguinal adenopathy  Musc: No cyanosis or clubbing  Skin: No rashes or ulcers, skin turgor is good  Neuro:  Cranial nerves are grossly intact, no focal motor weakness, follows commands appropriately  Psych:   Cannot be assessed        _______________________________________________________________________  Care Plan discussed with:    Comments   Patient x Discussed with patient in room. POC outlined and Questions answered    Family      RN x    Care Manager                    Consultant:  nisa ERVIN MD   _______________________________________________________________________  Recommended Disposition:   Home with Family x   HH/PT/OT/RN    SNF/LTC    OPAL    ________________________________________________________________________  TOTAL TIME: 75 minutes        Comments   >50% of visit spent in

## 2024-06-12 NOTE — ED PROVIDER NOTES
[facility]  EMERGENCY DEPARTMENT ENCOUNTER      Pt Name: Ayden Simpson  MRN: [id number]  Birthdate 1965  Date of evaluation: 6/12/2024  Provider: Jose Raul Mcintyre MD    CHIEF COMPLAINT       Chief Complaint   Patient presents with    Shortness of Breath         HISTORY OF PRESENT ILLNESS   (Location/Symptom, Timing/Onset, Context/Setting, Quality, Duration, Modifying Factors, Severity)  Note limiting factors.   58-year-old male presents from [facility] via private vehicle coming by staff member with concerns for respiratory distress.  Patient is a history of cerebral palsy, encephalopathy, nonverbal.  History is obtained from the group home staff member.  She states the patient has been sick with some upper respiratory symptoms for the past week or so.  [facility] and prescribed a short course of prednisone after which the patient seemed to be doing better.  Still had a little bit of nasal congestion and cough.  Today they noticed that he had significantly increased work of breathing and appeared very dry around his mouth.  Lungs sounded wet.  Patient arrives notably tachypneic with O2 sats of 90%.    The history is provided by a caregiver and medical records.         Review of External Medical Records:     Nursing Notes were reviewed.    REVIEW OF SYSTEMS    (2-9 systems for level 4, 10 or more for level 5)     Review of Systems   Unable to perform ROS: Patient nonverbal       Except as noted above the remainder of the review of systems was reviewed and negative.       PAST MEDICAL HISTORY     Past Medical History:   Diagnosis Date    Anxiety     Cerebral palsy (HCC)     Contracted, joint, multiple sites     upper extremities due to cerebral palsy    Developmental non-verbal disorder     Encephalopathy chronic     GERD (gastroesophageal reflux disease)     Hip dysplasia     Ill-defined condition     cerebral palsy    Intellectual disability     Mass of bladder      indicated due to septic shock not present          REASSESSMENT     ED Course as of 06/12/24 1712   Wed Jun 12, 2024   1655 EKG 12 Lead  ED ECG interpretation:  Rhythm: sinus tach. Rate (approx.): 129.  Axis: normal.  ST segment:  No concerning ST elevations or depressions. This EKG was independently interpreted by Jose Raul Mcintyre MD,ED Provider.   [JM]      ED Course User Index  [JM] Jose Raul Mcintyre MD           CONSULTS:  None    PROCEDURES:  Unless otherwise noted below, none     Procedures      FINAL IMPRESSION      1. Pneumonia of left lung due to infectious organism, unspecified part of lung    2. Severe sepsis (HCC)    3. H/O cerebral palsy          DISPOSITION/PLAN   DISPOSITION Decision To Admit 06/12/2024 04:38:17 PM    Perfect Serve Consult for Admission  5:12 PM    ED Room Number: C08/C08  Patient Name and age:  Ayden Simpson 58 y.o.  male  Working Diagnosis:   1. Pneumonia of left lung due to infectious organism, unspecified part of lung    2. Severe sepsis (HCC)    3. H/O cerebral palsy        COVID-19 Suspicion: No  Sepsis present:  Yes  Reassessment needed: No  Code Status:  Full Code  Readmission: No  Isolation Requirements: no  Recommended Level of Care: step down  Department: [place] [facility] - [phone]  Consulting Provider:     Other:  Pt with cerebral palsy, nonverbal.  Febrile, tachycardic, tachypnic with RR=33.  L sided pneumonia.  WBC=30, lactate=2.4. Given ceftriaxone/azithromycin.      Total critical care time spent exclusive of procedures:  35 minutes.     PATIENT REFERRED TO:  No follow-up provider specified.    DISCHARGE MEDICATIONS:  New Prescriptions    No medications on file         (Please note that portions of this note were completed with a voice recognition program.  Efforts were made to edit the dictations but occasionally words are mis-transcribed.)    Jose Raul Mcintyre MD (electronically signed)  Emergency Attending Physician / Physician Assistant / Nurse Practitioner

## 2024-06-12 NOTE — ED TRIAGE NOTES
Patient arrives to ed via pov with caregiver from group home. Per caregiver, pt has been being treated for Upper Respiratory infection with prednisone and worsening today. Per caregiver pt has Cerebral palsy, ID, non verbal, G- tube feeds, and normally 93-94% on room air.     Patient tachypenic on arrival. Appears uncomfortable.     Pt placed on 4L NC, sats improved from 90% to 94%.

## 2024-06-12 NOTE — ED NOTES
Contacted dr rice att regarding patient temp 101.1 rectally despite tylenol given. Notified bianca arauz for potential orders.

## 2024-06-13 ENCOUNTER — APPOINTMENT (OUTPATIENT)
Facility: HOSPITAL | Age: 59
End: 2024-06-13
Payer: MEDICAID

## 2024-06-13 LAB
ALBUMIN SERPL-MCNC: 2.8 G/DL (ref 3.5–5)
ALBUMIN/GLOB SERPL: 0.8 (ref 1.1–2.2)
ALP SERPL-CCNC: 107 U/L (ref 45–117)
ALT SERPL-CCNC: 60 U/L (ref 12–78)
ANION GAP SERPL CALC-SCNC: 5 MMOL/L (ref 5–15)
AST SERPL-CCNC: 24 U/L (ref 15–37)
BASOPHILS # BLD: 0 K/UL (ref 0–0.1)
BASOPHILS NFR BLD: 0 % (ref 0–1)
BILIRUB DIRECT SERPL-MCNC: 0.2 MG/DL (ref 0–0.2)
BILIRUB SERPL-MCNC: 0.6 MG/DL (ref 0.2–1)
BUN SERPL-MCNC: 17 MG/DL (ref 6–20)
BUN/CREAT SERPL: 22 (ref 12–20)
CALCIUM SERPL-MCNC: 8.7 MG/DL (ref 8.5–10.1)
CHLORIDE SERPL-SCNC: 113 MMOL/L (ref 97–108)
CO2 SERPL-SCNC: 24 MMOL/L (ref 21–32)
CREAT SERPL-MCNC: 0.77 MG/DL (ref 0.7–1.3)
DIFFERENTIAL METHOD BLD: ABNORMAL
EKG ATRIAL RATE: 129 BPM
EKG DIAGNOSIS: NORMAL
EKG P AXIS: 27 DEGREES
EKG P-R INTERVAL: 116 MS
EKG Q-T INTERVAL: 272 MS
EKG QRS DURATION: 68 MS
EKG QTC CALCULATION (BAZETT): 398 MS
EKG R AXIS: 13 DEGREES
EKG T AXIS: 19 DEGREES
EKG VENTRICULAR RATE: 129 BPM
EOSINOPHIL # BLD: 0 K/UL (ref 0–0.4)
EOSINOPHIL NFR BLD: 0 % (ref 0–7)
ERYTHROCYTE [DISTWIDTH] IN BLOOD BY AUTOMATED COUNT: 12.7 % (ref 11.5–14.5)
GLOBULIN SER CALC-MCNC: 3.5 G/DL (ref 2–4)
GLUCOSE SERPL-MCNC: 152 MG/DL (ref 65–100)
HCT VFR BLD AUTO: 46.1 % (ref 36.6–50.3)
HGB BLD-MCNC: 15.9 G/DL (ref 12.1–17)
IMM GRANULOCYTES # BLD AUTO: 0.3 K/UL (ref 0–0.04)
IMM GRANULOCYTES NFR BLD AUTO: 1 % (ref 0–0.5)
LACTATE SERPL-SCNC: 1.2 MMOL/L (ref 0.4–2)
LYMPHOCYTES # BLD: 0.8 K/UL (ref 0.8–3.5)
LYMPHOCYTES NFR BLD: 3 % (ref 12–49)
MAGNESIUM SERPL-MCNC: 2.2 MG/DL (ref 1.6–2.4)
MCH RBC QN AUTO: 32.8 PG (ref 26–34)
MCHC RBC AUTO-ENTMCNC: 34.5 G/DL (ref 30–36.5)
MCV RBC AUTO: 95.1 FL (ref 80–99)
MONOCYTES # BLD: 0.3 K/UL (ref 0–1)
MONOCYTES NFR BLD: 1 % (ref 5–13)
NEUTS SEG # BLD: 24.9 K/UL (ref 1.8–8)
NEUTS SEG NFR BLD: 95 % (ref 32–75)
NRBC # BLD: 0 K/UL (ref 0–0.01)
NRBC BLD-RTO: 0 PER 100 WBC
PHOSPHATE SERPL-MCNC: 2.7 MG/DL (ref 2.6–4.7)
PLATELET # BLD AUTO: 267 K/UL (ref 150–400)
PMV BLD AUTO: 9.5 FL (ref 8.9–12.9)
POTASSIUM SERPL-SCNC: 3.9 MMOL/L (ref 3.5–5.1)
PROT SERPL-MCNC: 6.3 G/DL (ref 6.4–8.2)
RBC # BLD AUTO: 4.85 M/UL (ref 4.1–5.7)
RBC MORPH BLD: ABNORMAL
SODIUM SERPL-SCNC: 142 MMOL/L (ref 136–145)
TRIGL SERPL-MCNC: 62 MG/DL
WBC # BLD AUTO: 26.3 K/UL (ref 4.1–11.1)

## 2024-06-13 PROCEDURE — 94761 N-INVAS EAR/PLS OXIMETRY MLT: CPT

## 2024-06-13 PROCEDURE — 2580000003 HC RX 258: Performed by: HOSPITALIST

## 2024-06-13 PROCEDURE — 6370000000 HC RX 637 (ALT 250 FOR IP): Performed by: INTERNAL MEDICINE

## 2024-06-13 PROCEDURE — 6370000000 HC RX 637 (ALT 250 FOR IP): Performed by: HOSPITALIST

## 2024-06-13 PROCEDURE — C9113 INJ PANTOPRAZOLE SODIUM, VIA: HCPCS | Performed by: INTERNAL MEDICINE

## 2024-06-13 PROCEDURE — 71275 CT ANGIOGRAPHY CHEST: CPT

## 2024-06-13 PROCEDURE — 80076 HEPATIC FUNCTION PANEL: CPT

## 2024-06-13 PROCEDURE — 83605 ASSAY OF LACTIC ACID: CPT

## 2024-06-13 PROCEDURE — 6360000002 HC RX W HCPCS: Performed by: HOSPITALIST

## 2024-06-13 PROCEDURE — 84478 ASSAY OF TRIGLYCERIDES: CPT

## 2024-06-13 PROCEDURE — 83735 ASSAY OF MAGNESIUM: CPT

## 2024-06-13 PROCEDURE — 93010 ELECTROCARDIOGRAM REPORT: CPT | Performed by: INTERNAL MEDICINE

## 2024-06-13 PROCEDURE — 6360000002 HC RX W HCPCS: Performed by: INTERNAL MEDICINE

## 2024-06-13 PROCEDURE — 2580000003 HC RX 258: Performed by: INTERNAL MEDICINE

## 2024-06-13 PROCEDURE — 2500000003 HC RX 250 WO HCPCS: Performed by: INTERNAL MEDICINE

## 2024-06-13 PROCEDURE — 84100 ASSAY OF PHOSPHORUS: CPT

## 2024-06-13 PROCEDURE — 94640 AIRWAY INHALATION TREATMENT: CPT

## 2024-06-13 PROCEDURE — 85025 COMPLETE CBC W/AUTO DIFF WBC: CPT

## 2024-06-13 PROCEDURE — 2700000000 HC OXYGEN THERAPY PER DAY

## 2024-06-13 PROCEDURE — 6360000004 HC RX CONTRAST MEDICATION: Performed by: RADIOLOGY

## 2024-06-13 PROCEDURE — 80048 BASIC METABOLIC PNL TOTAL CA: CPT

## 2024-06-13 PROCEDURE — 36415 COLL VENOUS BLD VENIPUNCTURE: CPT

## 2024-06-13 PROCEDURE — 2060000000 HC ICU INTERMEDIATE R&B

## 2024-06-13 RX ORDER — CETIRIZINE HYDROCHLORIDE 10 MG/1
5 TABLET ORAL DAILY
Status: DISCONTINUED | OUTPATIENT
Start: 2024-06-13 | End: 2024-06-28 | Stop reason: HOSPADM

## 2024-06-13 RX ORDER — MIRTAZAPINE 15 MG/1
15 TABLET, FILM COATED ORAL NIGHTLY
Status: DISCONTINUED | OUTPATIENT
Start: 2024-06-13 | End: 2024-06-28 | Stop reason: HOSPADM

## 2024-06-13 RX ORDER — ACETAMINOPHEN 160 MG/5ML
650 LIQUID ORAL 2 TIMES DAILY
Status: DISCONTINUED | OUTPATIENT
Start: 2024-06-13 | End: 2024-06-19

## 2024-06-13 RX ORDER — GUAIFENESIN 200 MG/10ML
400 LIQUID ORAL 2 TIMES DAILY
Status: DISCONTINUED | OUTPATIENT
Start: 2024-06-13 | End: 2024-06-28 | Stop reason: HOSPADM

## 2024-06-13 RX ORDER — ACETAMINOPHEN 160 MG/5ML
650 SUSPENSION ORAL 2 TIMES DAILY
Status: DISCONTINUED | OUTPATIENT
Start: 2024-06-13 | End: 2024-06-13 | Stop reason: SDUPTHER

## 2024-06-13 RX ORDER — SODIUM CHLORIDE FOR INHALATION 0.9 %
3 VIAL, NEBULIZER (ML) INHALATION EVERY 4 HOURS PRN
Status: DISCONTINUED | OUTPATIENT
Start: 2024-06-13 | End: 2024-06-28 | Stop reason: HOSPADM

## 2024-06-13 RX ORDER — METRONIDAZOLE 500 MG/100ML
500 INJECTION, SOLUTION INTRAVENOUS EVERY 8 HOURS
Status: DISCONTINUED | OUTPATIENT
Start: 2024-06-13 | End: 2024-06-17

## 2024-06-13 RX ORDER — BISACODYL 10 MG
10 SUPPOSITORY, RECTAL RECTAL DAILY PRN
Status: DISCONTINUED | OUTPATIENT
Start: 2024-06-13 | End: 2024-06-28 | Stop reason: HOSPADM

## 2024-06-13 RX ORDER — POLYETHYLENE GLYCOL 3350 17 G/17G
17 POWDER, FOR SOLUTION ORAL DAILY
Status: DISCONTINUED | OUTPATIENT
Start: 2024-06-13 | End: 2024-06-14

## 2024-06-13 RX ORDER — IPRATROPIUM BROMIDE AND ALBUTEROL SULFATE 2.5; .5 MG/3ML; MG/3ML
1 SOLUTION RESPIRATORY (INHALATION)
Status: DISCONTINUED | OUTPATIENT
Start: 2024-06-13 | End: 2024-06-20

## 2024-06-13 RX ORDER — SODIUM CHLORIDE FOR INHALATION 0.9 %
3 VIAL, NEBULIZER (ML) INHALATION EVERY 4 HOURS PRN
Status: DISCONTINUED | OUTPATIENT
Start: 2024-06-13 | End: 2024-06-13 | Stop reason: SDUPTHER

## 2024-06-13 RX ORDER — FERROUS SULFATE 300 MG/5ML
300 LIQUID (ML) ORAL DAILY
Status: DISCONTINUED | OUTPATIENT
Start: 2024-06-13 | End: 2024-06-28 | Stop reason: HOSPADM

## 2024-06-13 RX ADMIN — METRONIDAZOLE 500 MG: 500 INJECTION, SOLUTION INTRAVENOUS at 09:06

## 2024-06-13 RX ADMIN — WATER 80 MG: 1 INJECTION INTRAMUSCULAR; INTRAVENOUS; SUBCUTANEOUS at 06:05

## 2024-06-13 RX ADMIN — ENOXAPARIN SODIUM 30 MG: 100 INJECTION SUBCUTANEOUS at 08:52

## 2024-06-13 RX ADMIN — BACLOFEN 10 MG: 10 TABLET ORAL at 22:19

## 2024-06-13 RX ADMIN — GLYCOPYRROLATE 1 MG: 1 TABLET ORAL at 00:13

## 2024-06-13 RX ADMIN — SODIUM CHLORIDE, PRESERVATIVE FREE 40 MG: 5 INJECTION INTRAVENOUS at 08:49

## 2024-06-13 RX ADMIN — RISPERIDONE 1 MG: 1 SOLUTION ORAL at 12:40

## 2024-06-13 RX ADMIN — IOPAMIDOL 75 ML: 755 INJECTION, SOLUTION INTRAVENOUS at 01:55

## 2024-06-13 RX ADMIN — IPRATROPIUM BROMIDE 2 SPRAY: 21 SPRAY NASAL at 00:15

## 2024-06-13 RX ADMIN — SODIUM CHLORIDE, PRESERVATIVE FREE 40 MG: 5 INJECTION INTRAVENOUS at 22:27

## 2024-06-13 RX ADMIN — BACLOFEN 10 MG: 10 TABLET ORAL at 12:40

## 2024-06-13 RX ADMIN — POLYETHYLENE GLYCOL 3350 17 G: 17 POWDER, FOR SOLUTION ORAL at 12:40

## 2024-06-13 RX ADMIN — GLYCOPYRROLATE 1 MG: 1 TABLET ORAL at 12:40

## 2024-06-13 RX ADMIN — GLYCOPYRROLATE 1 MG: 1 TABLET ORAL at 08:51

## 2024-06-13 RX ADMIN — ACETAMINOPHEN 650 MG: 160 SOLUTION ORAL at 12:46

## 2024-06-13 RX ADMIN — GLYCOPYRROLATE 1 MG: 1 TABLET ORAL at 22:21

## 2024-06-13 RX ADMIN — RISPERIDONE 1 MG: 1 SOLUTION ORAL at 09:47

## 2024-06-13 RX ADMIN — GUAIFENESIN 400 MG: 200 SOLUTION ORAL at 12:43

## 2024-06-13 RX ADMIN — IPRATROPIUM BROMIDE AND ALBUTEROL SULFATE 1 DOSE: .5; 3 SOLUTION RESPIRATORY (INHALATION) at 19:17

## 2024-06-13 RX ADMIN — IPRATROPIUM BROMIDE AND ALBUTEROL SULFATE 1 DOSE: .5; 3 SOLUTION RESPIRATORY (INHALATION) at 16:09

## 2024-06-13 RX ADMIN — METRONIDAZOLE 500 MG: 500 INJECTION, SOLUTION INTRAVENOUS at 17:57

## 2024-06-13 RX ADMIN — RISPERIDONE 1 MG: 1 SOLUTION ORAL at 00:14

## 2024-06-13 RX ADMIN — GUAIFENESIN 400 MG: 200 SOLUTION ORAL at 22:23

## 2024-06-13 RX ADMIN — AZELASTINE HYDROCHLORIDE 2 SPRAY: 137 SPRAY, METERED NASAL at 00:15

## 2024-06-13 RX ADMIN — WATER 40 MG: 1 INJECTION INTRAMUSCULAR; INTRAVENOUS; SUBCUTANEOUS at 14:46

## 2024-06-13 RX ADMIN — SODIUM CHLORIDE: 9 INJECTION, SOLUTION INTRAVENOUS at 00:16

## 2024-06-13 RX ADMIN — Medication 300 MG: at 12:43

## 2024-06-13 RX ADMIN — IPRATROPIUM BROMIDE 2 SPRAY: 21 SPRAY NASAL at 10:20

## 2024-06-13 RX ADMIN — SODIUM CHLORIDE, PRESERVATIVE FREE 10 ML: 5 INJECTION INTRAVENOUS at 22:26

## 2024-06-13 RX ADMIN — ACETAMINOPHEN 650 MG: 160 SOLUTION ORAL at 22:16

## 2024-06-13 RX ADMIN — SODIUM CHLORIDE, PRESERVATIVE FREE 10 ML: 5 INJECTION INTRAVENOUS at 09:47

## 2024-06-13 RX ADMIN — BACLOFEN 10 MG: 10 TABLET ORAL at 00:13

## 2024-06-13 RX ADMIN — WATER 40 MG: 1 INJECTION INTRAMUSCULAR; INTRAVENOUS; SUBCUTANEOUS at 22:25

## 2024-06-13 RX ADMIN — WATER 1000 MG: 1 INJECTION INTRAMUSCULAR; INTRAVENOUS; SUBCUTANEOUS at 15:20

## 2024-06-13 RX ADMIN — AZITHROMYCIN MONOHYDRATE 500 MG: 500 INJECTION, POWDER, LYOPHILIZED, FOR SOLUTION INTRAVENOUS at 18:57

## 2024-06-13 RX ADMIN — AZELASTINE HYDROCHLORIDE 2 SPRAY: 137 SPRAY, METERED NASAL at 08:50

## 2024-06-13 RX ADMIN — SODIUM CHLORIDE: 9 INJECTION, SOLUTION INTRAVENOUS at 03:35

## 2024-06-13 RX ADMIN — IPRATROPIUM BROMIDE AND ALBUTEROL SULFATE 1 DOSE: .5; 3 SOLUTION RESPIRATORY (INHALATION) at 11:59

## 2024-06-13 RX ADMIN — BACLOFEN 10 MG: 10 TABLET ORAL at 08:52

## 2024-06-13 RX ADMIN — CETIRIZINE HYDROCHLORIDE 5 MG: 10 TABLET, FILM COATED ORAL at 12:53

## 2024-06-13 NOTE — CARE COORDINATION
06/13/24 1548   Service Assessment   Patient Orientation Alert and Oriented   Cognition Alert   History Provided By Other (see comment)  (caregiver)   Primary Caregiver Private caregiver  (longterm)   Accompanied By/Relationship Mitzi Blevins, caregiver   Support Systems Other (Comment)  (group home staff)   Patient's Healthcare Decision Maker is: Legal Next of Kin   PCP Verified by CM Yes  (Olimpia Jorditorri, [facility])   Last Visit to PCP Within last 3 months   Prior Functional Level Assistance with the following:;Bathing;Dressing;Toileting;Feeding;Cooking;Housework;Shopping;Mobility   Current Functional Level Assistance with the following:;Bathing;Dressing;Toileting;Feeding;Cooking;Housework;Shopping;Mobility   Can patient return to prior living arrangement Yes   Ability to make needs known: Fair   Family able to assist with home care needs: No   Would you like for me to discuss the discharge plan with any other family members/significant others, and if so, who? No   Financial Resources Medicaid   Social/Functional History   Lives With Other (comment)  ([facility])   Type of Home   (group home)   Home Layout One level   Home Access Level entry   Bathroom Shower/Tub Tub/Shower unit   Bathroom Equipment   (has a special bathing chair in the tub)   Home Equipment Wheelchair - Manual   Receives Help From Other (comment)  (group home staff)   Active  No   Patient's  Info group home   Discharge Planning   Type of Residence Group Home   Patient expects to be discharged to: [facility]   Services At/After Discharge   Mode of Transport at Discharge Other (see comment)  (group home stretcher transport)   Confirm Follow Up Transport Other (see comment)  (group home)     Pharmacy: [facility]  Patient is a resident at [facility].  He is nonverbal at baseline.    Mitzi Blevins is his caregiver ([phone])  The home will transport him at d/c by w/c. They can accept on

## 2024-06-13 NOTE — PLAN OF CARE
Problem: Discharge Planning  Goal: Discharge to home or other facility with appropriate resources  Outcome: Progressing  Flowsheets (Taken 6/12/2024 1942)  Discharge to home or other facility with appropriate resources:   Identify barriers to discharge with patient and caregiver   Arrange for needed discharge resources and transportation as appropriate   Identify discharge learning needs (meds, wound care, etc)

## 2024-06-13 NOTE — CONSULTS
Comprehensive Nutrition Assessment    Type and Reason for Visit: Initial, Consult    Nutrition Recommendations/Plan:   Adjusted TF to home formula:     Ana [employer] 1.4 Standard- running @ 40 mL/hr. Adjust TF to slowly increase to goal of 75 mL/hr x 18 hrs with 70 mL Flush Q3H.        Malnutrition Assessment:  Malnutrition Status:  At risk for malnutrition (Comment) (06/13/24 1203)    Context:    Tube feed, CP, NPO       Nutrition Assessment:    Past medical hx:       Diagnosis Date    Anxiety     Cerebral palsy (HCC)     Contracted, joint, multiple sites     upper extremities due to cerebral palsy    Developmental non-verbal disorder     Encephalopathy chronic     GERD (gastroesophageal reflux disease)     Hip dysplasia     Ill-defined condition     cerebral palsy    Intellectual disability     Mass of bladder     Microcephaly (HCC)     Muscle spasm     upper and lower extremities       Consult for TF received. Discussed home tube feed regimen with caretaker. Since Feb 2024 pt has been receiving [employer] 1.4 standard formula. Pt receives 3.5 cartons per day @ 75 mL/hr x18 hrs which provides 1547 kcal, 178 g Carbs, 70 g Pro, 808 mL free water, plus 70 mL flushes every 2 hours for additional ~700 mL free water for total of 1508 mL.     Caregiver reports mild weight gain since switching from Jevity 1.5 to [employer] in Feb due to better tolerance of GI formula.     Order changed to provide [employer] 1.4. RD provided 4 cartons for today's feeds.      Current Nutrition Therapies:  ADULT TUBE FEEDING; PEG; Plant Based; Cyclic; 40; 6:00 AM; 11:59 PM; 70; Q 3 hours  Meal Intake:   No data found.  Supplement Intake:  No data found.  Nutrition Support: n/a    Nutritionally Significant Medications:  Scheduled Meds:   metroNIDAZOLE  500 mg IntraVENous Q8H    methylPREDNISolone  40 mg IntraVENous Q8H    ipratropium 0.5 mg-albuterol 2.5 mg  1 Dose Inhalation 4x Daily RT    racepinephrine HCl  0.5 mL Nebulization Once     support - enteral nutrition    Nutrition Interventions:   Food and/or Nutrient Delivery: Continue NPO, Modify Tube Feeding  Nutrition Education/Counseling: Education not indicated  Coordination of Nutrition Care: Continue to monitor while inpatient, Interdisciplinary Rounds       Goals:     Goals: Meet at least 75% of estimated needs, by next RD assessment       Nutrition Monitoring and Evaluation:   Behavioral-Environmental Outcomes: None Identified  Food/Nutrient Intake Outcomes: Enteral Nutrition Intake/Tolerance  Physical Signs/Symptoms Outcomes: Biochemical Data, Weight, GI Status    Discharge Planning:    Enteral Nutrition     Juanjose Agosto RD  Available via [employer]  Office # [phone]

## 2024-06-13 NOTE — PROGRESS NOTES
[facility]  [address] [place] [address] [phone]        Hospitalist Progress Note      NAME: Ayden Simpson   :  1965  MRM:  [id number]    Date/Time: 2024  4:13 PM         Subjective:     Chief Complaint:  Pt non-verbal     Pt's caregiver/home director at bedside and able to provide good information re: preceeding events of illness     ROS:  (bold if positive, if negative)    NPO        Objective:       Vitals:          Last 24hrs VS reviewed since prior progress note. Most recent are:    Vitals:    24 1513   BP: 103/60   Pulse: 98   Resp: 18   Temp: 98.1 °F (36.7 °C)   SpO2: 95%     SpO2 Readings from Last 6 Encounters:   24 95%          Intake/Output Summary (Last 24 hours) at 2024 1613  Last data filed at 2024 1527  Gross per 24 hour   Intake 120 ml   Output --   Net 120 ml          Exam:     Physical Exam:    Gen: frail male. Increased WOB. Inspiratory stridor  HEENT:  Pink conjunctivae, PERRL, hearing intact to voice, moist mucous membranes  Neck:  Supple, without masses, thyroid non-tender  Resp: increased WOB. Scattered coarse breath sounds. Inspiratory wheezing   Card:  No murmurs, normal S1, S2 without thrills, bruits or peripheral edema  Abd: PEG tube in place   Musc:  No cyanosis or clubbing  Skin:  No rashes or ulcers, skin turgor is good  Neuro: Contracted extremities. Grunts to verbalize otherwise non-verbal   Psych: Poor insight     Medications Reviewed: (see below)    Lab Data Reviewed: (see below)    ______________________________________________________________________    Medications:     Current Facility-Administered Medications   Medication Dose Route Frequency    metroNIDAZOLE (FLAGYL) 500 mg in 0.9% NaCl 100 mL IVPB premix  500 mg IntraVENous Q8H    methylPREDNISolone sodium succ (SOLU-MEDROL) 40 mg in sterile water 1 mL injection  40 mg IntraVENous Q8H    sodium chloride nebulizer 0.9 % solution 3 mL  3 mL

## 2024-06-13 NOTE — FLOWSHEET NOTE
Patient arrived to unit via bed with caregiver and transferring nurse, condition appears stable, full assessment to follow.

## 2024-06-14 ENCOUNTER — APPOINTMENT (OUTPATIENT)
Facility: HOSPITAL | Age: 59
End: 2024-06-14
Payer: MEDICAID

## 2024-06-14 LAB
ALBUMIN SERPL-MCNC: 2.7 G/DL (ref 3.5–5)
ALBUMIN/GLOB SERPL: 0.8 (ref 1.1–2.2)
ALP SERPL-CCNC: 93 U/L (ref 45–117)
ALT SERPL-CCNC: 52 U/L (ref 12–78)
ANION GAP SERPL CALC-SCNC: 5 MMOL/L (ref 5–15)
AST SERPL-CCNC: 25 U/L (ref 15–37)
BACTERIA SPEC CULT: NORMAL
BACTERIA SPEC CULT: NORMAL
BASOPHILS # BLD: 0 K/UL (ref 0–0.1)
BASOPHILS NFR BLD: 0 % (ref 0–1)
BILIRUB DIRECT SERPL-MCNC: 0.2 MG/DL (ref 0–0.2)
BILIRUB SERPL-MCNC: 0.4 MG/DL (ref 0.2–1)
BUN SERPL-MCNC: 25 MG/DL (ref 6–20)
BUN/CREAT SERPL: 31 (ref 12–20)
CALCIUM SERPL-MCNC: 9 MG/DL (ref 8.5–10.1)
CHLORIDE SERPL-SCNC: 114 MMOL/L (ref 97–108)
CO2 SERPL-SCNC: 25 MMOL/L (ref 21–32)
CREAT SERPL-MCNC: 0.81 MG/DL (ref 0.7–1.3)
DIFFERENTIAL METHOD BLD: ABNORMAL
EOSINOPHIL # BLD: 0 K/UL (ref 0–0.4)
EOSINOPHIL NFR BLD: 0 % (ref 0–7)
ERYTHROCYTE [DISTWIDTH] IN BLOOD BY AUTOMATED COUNT: 12.6 % (ref 11.5–14.5)
GLOBULIN SER CALC-MCNC: 3.3 G/DL (ref 2–4)
GLUCOSE SERPL-MCNC: 147 MG/DL (ref 65–100)
HCT VFR BLD AUTO: 43.6 % (ref 36.6–50.3)
HGB BLD-MCNC: 14.5 G/DL (ref 12.1–17)
IMM GRANULOCYTES # BLD AUTO: 0.2 K/UL (ref 0–0.04)
IMM GRANULOCYTES NFR BLD AUTO: 1 % (ref 0–0.5)
LYMPHOCYTES # BLD: 0.7 K/UL (ref 0.8–3.5)
LYMPHOCYTES NFR BLD: 3 % (ref 12–49)
MAGNESIUM SERPL-MCNC: 2.1 MG/DL (ref 1.6–2.4)
MCH RBC QN AUTO: 32.3 PG (ref 26–34)
MCHC RBC AUTO-ENTMCNC: 33.3 G/DL (ref 30–36.5)
MCV RBC AUTO: 97.1 FL (ref 80–99)
MONOCYTES # BLD: 0.7 K/UL (ref 0–1)
MONOCYTES NFR BLD: 3 % (ref 5–13)
NEUTS SEG # BLD: 21.2 K/UL (ref 1.8–8)
NEUTS SEG NFR BLD: 93 % (ref 32–75)
NRBC # BLD: 0 K/UL (ref 0–0.01)
NRBC BLD-RTO: 0 PER 100 WBC
NT PRO BNP: 646 PG/ML
PHOSPHATE SERPL-MCNC: 2.8 MG/DL (ref 2.6–4.7)
PLATELET # BLD AUTO: 257 K/UL (ref 150–400)
PMV BLD AUTO: 9.7 FL (ref 8.9–12.9)
POTASSIUM SERPL-SCNC: 4.3 MMOL/L (ref 3.5–5.1)
PROT SERPL-MCNC: 6 G/DL (ref 6.4–8.2)
RBC # BLD AUTO: 4.49 M/UL (ref 4.1–5.7)
RBC MORPH BLD: ABNORMAL
SERVICE CMNT-IMP: NORMAL
SODIUM SERPL-SCNC: 144 MMOL/L (ref 136–145)
WBC # BLD AUTO: 22.8 K/UL (ref 4.1–11.1)

## 2024-06-14 PROCEDURE — 85025 COMPLETE CBC W/AUTO DIFF WBC: CPT

## 2024-06-14 PROCEDURE — 80048 BASIC METABOLIC PNL TOTAL CA: CPT

## 2024-06-14 PROCEDURE — 6370000000 HC RX 637 (ALT 250 FOR IP): Performed by: INTERNAL MEDICINE

## 2024-06-14 PROCEDURE — 94761 N-INVAS EAR/PLS OXIMETRY MLT: CPT

## 2024-06-14 PROCEDURE — 83880 ASSAY OF NATRIURETIC PEPTIDE: CPT

## 2024-06-14 PROCEDURE — 83735 ASSAY OF MAGNESIUM: CPT

## 2024-06-14 PROCEDURE — 2700000000 HC OXYGEN THERAPY PER DAY

## 2024-06-14 PROCEDURE — 6360000002 HC RX W HCPCS: Performed by: HOSPITALIST

## 2024-06-14 PROCEDURE — 71045 X-RAY EXAM CHEST 1 VIEW: CPT

## 2024-06-14 PROCEDURE — 94640 AIRWAY INHALATION TREATMENT: CPT

## 2024-06-14 PROCEDURE — 74018 RADEX ABDOMEN 1 VIEW: CPT

## 2024-06-14 PROCEDURE — 2500000003 HC RX 250 WO HCPCS: Performed by: INTERNAL MEDICINE

## 2024-06-14 PROCEDURE — 6360000002 HC RX W HCPCS

## 2024-06-14 PROCEDURE — 84100 ASSAY OF PHOSPHORUS: CPT

## 2024-06-14 PROCEDURE — 6360000002 HC RX W HCPCS: Performed by: INTERNAL MEDICINE

## 2024-06-14 PROCEDURE — 2580000003 HC RX 258: Performed by: HOSPITALIST

## 2024-06-14 PROCEDURE — 6370000000 HC RX 637 (ALT 250 FOR IP): Performed by: HOSPITALIST

## 2024-06-14 PROCEDURE — 6370000000 HC RX 637 (ALT 250 FOR IP)

## 2024-06-14 PROCEDURE — C9113 INJ PANTOPRAZOLE SODIUM, VIA: HCPCS | Performed by: INTERNAL MEDICINE

## 2024-06-14 PROCEDURE — 2060000000 HC ICU INTERMEDIATE R&B

## 2024-06-14 PROCEDURE — 80076 HEPATIC FUNCTION PANEL: CPT

## 2024-06-14 PROCEDURE — 36415 COLL VENOUS BLD VENIPUNCTURE: CPT

## 2024-06-14 PROCEDURE — 2580000003 HC RX 258: Performed by: INTERNAL MEDICINE

## 2024-06-14 RX ORDER — LORAZEPAM 2 MG/ML
2 CONCENTRATE ORAL 2 TIMES DAILY
COMMUNITY

## 2024-06-14 RX ORDER — LORAZEPAM 2 MG/ML
0.5 INJECTION INTRAMUSCULAR EVERY 6 HOURS PRN
Status: DISCONTINUED | OUTPATIENT
Start: 2024-06-14 | End: 2024-06-28 | Stop reason: HOSPADM

## 2024-06-14 RX ORDER — GLYCOPYRROLATE 0.2 MG/ML
0.1 INJECTION INTRAMUSCULAR; INTRAVENOUS EVERY 4 HOURS
Status: DISCONTINUED | OUTPATIENT
Start: 2024-06-14 | End: 2024-06-14

## 2024-06-14 RX ORDER — ONDANSETRON 2 MG/ML
4 INJECTION INTRAMUSCULAR; INTRAVENOUS EVERY 6 HOURS PRN
Status: DISCONTINUED | OUTPATIENT
Start: 2024-06-14 | End: 2024-06-28 | Stop reason: HOSPADM

## 2024-06-14 RX ORDER — SODIUM CHLORIDE 9 MG/ML
INJECTION, SOLUTION INTRAVENOUS CONTINUOUS
Status: DISCONTINUED | OUTPATIENT
Start: 2024-06-14 | End: 2024-06-14

## 2024-06-14 RX ORDER — GLYCOPYRROLATE 0.2 MG/ML
0.1 INJECTION INTRAMUSCULAR; INTRAVENOUS EVERY 4 HOURS PRN
Status: DISCONTINUED | OUTPATIENT
Start: 2024-06-14 | End: 2024-06-28 | Stop reason: HOSPADM

## 2024-06-14 RX ORDER — BUMETANIDE 0.25 MG/ML
0.5 INJECTION INTRAMUSCULAR; INTRAVENOUS ONCE
Status: COMPLETED | OUTPATIENT
Start: 2024-06-14 | End: 2024-06-14

## 2024-06-14 RX ORDER — GLYCOPYRROLATE 0.2 MG/ML
0.1 INJECTION INTRAMUSCULAR; INTRAVENOUS ONCE
Status: COMPLETED | OUTPATIENT
Start: 2024-06-14 | End: 2024-06-14

## 2024-06-14 RX ORDER — LACTOBACILLUS RHAMNOSUS GG 10B CELL
1 CAPSULE ORAL
Status: DISCONTINUED | OUTPATIENT
Start: 2024-06-15 | End: 2024-06-14

## 2024-06-14 RX ORDER — LORAZEPAM 1 MG/1
2 TABLET ORAL 2 TIMES DAILY
Status: DISCONTINUED | OUTPATIENT
Start: 2024-06-14 | End: 2024-06-28 | Stop reason: HOSPADM

## 2024-06-14 RX ORDER — LACTOBACILLUS RHAMNOSUS GG 10B CELL
1 CAPSULE ORAL
Status: DISCONTINUED | OUTPATIENT
Start: 2024-06-14 | End: 2024-06-28 | Stop reason: HOSPADM

## 2024-06-14 RX ADMIN — MIRTAZAPINE 15 MG: 15 TABLET, FILM COATED ORAL at 22:20

## 2024-06-14 RX ADMIN — BACLOFEN 10 MG: 10 TABLET ORAL at 22:20

## 2024-06-14 RX ADMIN — MIRTAZAPINE 15 MG: 15 TABLET, FILM COATED ORAL at 00:36

## 2024-06-14 RX ADMIN — WATER 40 MG: 1 INJECTION INTRAMUSCULAR; INTRAVENOUS; SUBCUTANEOUS at 22:23

## 2024-06-14 RX ADMIN — METRONIDAZOLE 500 MG: 500 INJECTION, SOLUTION INTRAVENOUS at 16:31

## 2024-06-14 RX ADMIN — AZELASTINE HYDROCHLORIDE 2 SPRAY: 137 SPRAY, METERED NASAL at 09:14

## 2024-06-14 RX ADMIN — ACETAMINOPHEN 650 MG: 160 SOLUTION ORAL at 22:21

## 2024-06-14 RX ADMIN — BACLOFEN 10 MG: 10 TABLET ORAL at 09:07

## 2024-06-14 RX ADMIN — WATER 1000 MG: 1 INJECTION INTRAMUSCULAR; INTRAVENOUS; SUBCUTANEOUS at 15:38

## 2024-06-14 RX ADMIN — RISPERIDONE 1 MG: 1 SOLUTION ORAL at 10:04

## 2024-06-14 RX ADMIN — GLYCOPYRROLATE 0.1 MG: 0.2 INJECTION INTRAMUSCULAR; INTRAVENOUS at 12:39

## 2024-06-14 RX ADMIN — SODIUM CHLORIDE, PRESERVATIVE FREE 10 ML: 5 INJECTION INTRAVENOUS at 09:17

## 2024-06-14 RX ADMIN — GLYCOPYRROLATE 1 MG: 1 TABLET ORAL at 14:32

## 2024-06-14 RX ADMIN — AZELASTINE HYDROCHLORIDE 2 SPRAY: 137 SPRAY, METERED NASAL at 22:18

## 2024-06-14 RX ADMIN — SODIUM CHLORIDE, PRESERVATIVE FREE 40 MG: 5 INJECTION INTRAVENOUS at 09:11

## 2024-06-14 RX ADMIN — SODIUM CHLORIDE: 9 INJECTION, SOLUTION INTRAVENOUS at 09:36

## 2024-06-14 RX ADMIN — IPRATROPIUM BROMIDE AND ALBUTEROL SULFATE 1 DOSE: .5; 3 SOLUTION RESPIRATORY (INHALATION) at 15:15

## 2024-06-14 RX ADMIN — IPRATROPIUM BROMIDE AND ALBUTEROL SULFATE 1 DOSE: .5; 3 SOLUTION RESPIRATORY (INHALATION) at 07:31

## 2024-06-14 RX ADMIN — RISPERIDONE 1 MG: 1 SOLUTION ORAL at 00:38

## 2024-06-14 RX ADMIN — LORAZEPAM 2 MG: 1 TABLET ORAL at 01:50

## 2024-06-14 RX ADMIN — IPRATROPIUM BROMIDE 2 SPRAY: 21 SPRAY NASAL at 00:17

## 2024-06-14 RX ADMIN — LORAZEPAM 2 MG: 1 TABLET ORAL at 09:07

## 2024-06-14 RX ADMIN — ONDANSETRON 4 MG: 2 INJECTION INTRAMUSCULAR; INTRAVENOUS at 01:05

## 2024-06-14 RX ADMIN — GUAIFENESIN 400 MG: 200 SOLUTION ORAL at 09:11

## 2024-06-14 RX ADMIN — IPRATROPIUM BROMIDE AND ALBUTEROL SULFATE 1 DOSE: .5; 3 SOLUTION RESPIRATORY (INHALATION) at 20:20

## 2024-06-14 RX ADMIN — BUMETANIDE 0.5 MG: 0.25 INJECTION INTRAMUSCULAR; INTRAVENOUS at 12:36

## 2024-06-14 RX ADMIN — WATER 40 MG: 1 INJECTION INTRAMUSCULAR; INTRAVENOUS; SUBCUTANEOUS at 12:35

## 2024-06-14 RX ADMIN — ENOXAPARIN SODIUM 30 MG: 100 INJECTION SUBCUTANEOUS at 09:11

## 2024-06-14 RX ADMIN — WATER 40 MG: 1 INJECTION INTRAMUSCULAR; INTRAVENOUS; SUBCUTANEOUS at 05:24

## 2024-06-14 RX ADMIN — METRONIDAZOLE 500 MG: 500 INJECTION, SOLUTION INTRAVENOUS at 00:21

## 2024-06-14 RX ADMIN — LORAZEPAM 2 MG: 1 TABLET ORAL at 22:20

## 2024-06-14 RX ADMIN — SODIUM CHLORIDE, PRESERVATIVE FREE 40 MG: 5 INJECTION INTRAVENOUS at 22:24

## 2024-06-14 RX ADMIN — Medication 1 CAPSULE: at 15:38

## 2024-06-14 RX ADMIN — AZITHROMYCIN MONOHYDRATE 500 MG: 500 INJECTION, POWDER, LYOPHILIZED, FOR SOLUTION INTRAVENOUS at 16:53

## 2024-06-14 RX ADMIN — RISPERIDONE 1 MG: 1 SOLUTION ORAL at 14:32

## 2024-06-14 RX ADMIN — CETIRIZINE HYDROCHLORIDE 5 MG: 10 TABLET, FILM COATED ORAL at 09:07

## 2024-06-14 RX ADMIN — ACETAMINOPHEN 650 MG: 160 SOLUTION ORAL at 09:10

## 2024-06-14 RX ADMIN — AZELASTINE HYDROCHLORIDE 2 SPRAY: 137 SPRAY, METERED NASAL at 00:16

## 2024-06-14 RX ADMIN — RISPERIDONE 1 MG: 1 SOLUTION ORAL at 22:21

## 2024-06-14 RX ADMIN — METRONIDAZOLE 500 MG: 500 INJECTION, SOLUTION INTRAVENOUS at 09:38

## 2024-06-14 RX ADMIN — GLYCOPYRROLATE 1 MG: 1 TABLET ORAL at 09:07

## 2024-06-14 RX ADMIN — SODIUM CHLORIDE, PRESERVATIVE FREE 10 ML: 5 INJECTION INTRAVENOUS at 22:22

## 2024-06-14 RX ADMIN — IPRATROPIUM BROMIDE AND ALBUTEROL SULFATE 1 DOSE: .5; 3 SOLUTION RESPIRATORY (INHALATION) at 11:26

## 2024-06-14 RX ADMIN — BACLOFEN 10 MG: 10 TABLET ORAL at 12:35

## 2024-06-14 RX ADMIN — IPRATROPIUM BROMIDE 2 SPRAY: 21 SPRAY NASAL at 22:22

## 2024-06-14 RX ADMIN — IPRATROPIUM BROMIDE 2 SPRAY: 21 SPRAY NASAL at 09:17

## 2024-06-14 RX ADMIN — GLYCOPYRROLATE 1 MG: 1 TABLET ORAL at 22:20

## 2024-06-14 RX ADMIN — Medication 300 MG: at 09:11

## 2024-06-14 RX ADMIN — GUAIFENESIN 400 MG: 200 SOLUTION ORAL at 22:21

## 2024-06-14 ASSESSMENT — PAIN SCALES - WONG BAKER
WONGBAKER_NUMERICALRESPONSE: HURTS A LITTLE BIT
WONGBAKER_NUMERICALRESPONSE: NO HURT

## 2024-06-14 NOTE — PROGRESS NOTES
[facility]  [address] [place] [address] [phone]        Hospitalist Progress Note      NAME: Ayden Simpson   :  1965  MRM:  [id number]    Date/Time: 2024  3:13 PM         Subjective:     Chief Complaint:  Pt non-verbal     Pt's caregiver/home director at bedside. Concern for aspiration event last evening. Had an episode of emesis of tube feed like substance.    ROS:  (bold if positive, if negative)    NPO        Objective:       Vitals:          Last 24hrs VS reviewed since prior progress note. Most recent are:    Vitals:    24 1504   BP: 126/65   Pulse: (!) 116   Resp: 24   Temp: 98.7 °F (37.1 °C)   SpO2:      SpO2 Readings from Last 6 Encounters:   24 95%          Intake/Output Summary (Last 24 hours) at 2024 1513  Last data filed at 2024 1451  Gross per 24 hour   Intake 3963.43 ml   Output 1200 ml   Net 2763.43 ml          Exam:     Physical Exam:    Gen: frail male. Increased WOB. Inspiratory stridor  HEENT:  Pink conjunctivae, PERRL, hearing intact to voice, moist mucous membranes  Neck:  Supple, without masses, thyroid non-tender  Resp: increased WOB. Scattered coarse breath sounds. Inspiratory wheezing   Card:  No murmurs, normal S1, S2 without thrills, bruits or peripheral edema  Abd: PEG tube in place   Musc:  No cyanosis or clubbing  Skin:  No rashes or ulcers, skin turgor is good  Neuro: Contracted extremities. Grunts to verbalize otherwise non-verbal   Psych: Poor insight     Medications Reviewed: (see below)    Lab Data Reviewed: (see below)    ______________________________________________________________________    Medications:     Current Facility-Administered Medications   Medication Dose Route Frequency    ondansetron (ZOFRAN) injection 4 mg  4 mg IntraVENous Q6H PRN    LORazepam (ATIVAN) tablet 2 mg  2 mg Per G Tube BID    LORazepam (ATIVAN) injection 0.5 mg  0.5 mg IntraVENous Q6H PRN    metroNIDAZOLE (FLAGYL)

## 2024-06-14 NOTE — PROGRESS NOTES
Physician Progress Note      PATIENT:               MONIQUE CODY  CSN #:                  [id number]  :                       1965  ADMIT DATE:       2024 3:42 PM  DISCH DATE:  RESPONDING  PROVIDER #:        Mary Lou Orantes MD          QUERY TEXT:    Good Afternoon    This patient admitted for Sepsis due to LLL Pneumonia.    The Er provider also notes \"Severe Sepsis\"    Severe Sepsis was dropped from the H&P.    If possible, please document in the progress notes and discharge summary if   Severe Sepsis POA was:    The medical record reflects the following:  Risk Factors: Pneumonia, Sepsis Acute Hypoxic respiratory failure.  Clinical Indicators: Er provider notes \"Severe Sepsis\", Lactic acid @ 2. 43,   o2 sats 88% on RA, MD notes Acute hypoxic respiratory failure. CT chest with \"   Patchy ground glass infiltrates in the left lung 2nd to Pneumonia or   Aspiration.  Treatment: Blood cx,  x1 500cc NS bolus, x1 1000cc NS bolus, Lactic acid   assessed , IV Flagyl, IV Zithromax    Thank you  Gevoanna Pradhan, BSN, RN, CPHQ, CCDS,SMART  Options provided:  -- Severe Sepsis POA is  confirmed after study  -- Severe Sepsis POA is  ruled out after study. Sepsis due to Pneumonia but is   not severe sepsis  -- Other - I will add my own diagnosis  -- Disagree - Not applicable / Not valid  -- Disagree - Clinically unable to determine / Unknown  -- Refer to Clinical Documentation Reviewer    PROVIDER RESPONSE TEXT:    Severe Sepsis POA is confirmed after study.    Query created by: Geovanna Pradhan on 2024 1:03 PM      Electronically signed by:  Mary Lou Orantes MD 2024 3:13 PM

## 2024-06-14 NOTE — PROGRESS NOTES
0130-patient G tube feed off at 0000- residual 10ml. Patient has one episode of vomiting tan emesis- NP notified. Zofran ordered and administered- patient HOB elevated- no s/s of respiratory distress. Will hold 0600 tube feed per NP order.

## 2024-06-14 NOTE — FLOWSHEET NOTE
Patient w/ episode of vomiting after completion of tube feeding. Patient w/o change in respiratory status. Concerned patient may aspirate. Will hold scheduled AM feeding. Zofran PRN. HOB elevated. Continue Rocephin and Flagyl as scheduled. Dietary managing tube feedings. Monitor for worsening resp status.

## 2024-06-14 NOTE — ACP (ADVANCE CARE PLANNING)
Briefly, due to work of breathing this AM, spoke with pt's caregiver about goals of care. She is not POA but provided the contact number for pt's legally appointed guardian Alejandra Gillis. Spoke to Alejandra and she would like to give Ayden every opportunity to live including CPR/intubation if needed. She maintains that she feels he has a good quality of life at baseline and would like him to remain FULL CODE.  She does endorse that he has been having more secretions over the last two years.

## 2024-06-14 NOTE — PROGRESS NOTES
RRT evaluation:Pt is at his baseline, heart sound and lung sound are within normal range. VSS, skin color is appropriate for his ethnicity. CP nor SOB can't be assessed due to AMS, keep current POC.     Sepsis Score 18    Predictive Model Details          18 (Low)  Factor Value    Calculated 6/14/2024 19:47 8% WBC Count 22.8 K/uL    [facility] EARLY DETECTION OF SEPSIS VERSION 2 Model 7% Total Active Inpatient Meds 32     6% Respirations 24     -6% Total Admins 88     6% Age 58 years old     6% Ayush Coma Scale 8     -5% Total Admins Last 24 Hours 43     5% O2 Delivery Method NASAL CANNULA     -4% Duration of Encounter 2.2 days     -4% Lactate 2.17 mmol/L        Predictive Model Details          79 (Warning)  Factor Value    Calculated 6/14/2024 19:50 32% Catharpin coma scale 8    Deterioration Index Model 14% Supplemental oxygen Heated high flow cannula     13% Respiratory rate 24     12% Age 58 years old     11% Neurological exam X     5% Cardiac rhythm Sinus tachy     5% WBC count abnormal (22.8 K/uL)     4% Sodium 144 mmol/L     2% Potassium 4.3 mmol/L     1% Systolic 132     1% BUN abnormal (25 MG/DL)     1% Blood pH 7.44       0% Pulse 81     0% Pulse oximetry 97 %     0% Hematocrit 43.6 %     0% Temperature 98.2 °F (36.8 °C)          Spoke with primary RN regarding pt status. Pt resting comfortably in bed with no s/s of distress. Denies CP or SOB. No further interventions at this time.     Lawson Anna RN

## 2024-06-14 NOTE — CARE COORDINATION
CM Note:  Pt on 2-3L O2--wean as tolerated  Group home will transport pt at d/c    BOBBY John  6/14/2024  12:28 PM

## 2024-06-14 NOTE — PROGRESS NOTES
0700: Bedside and Verbal shift change report given to Tara RN (oncoming nurse) by Kelly ROSALES (offgoing nurse). Report included the following information Nurse Handoff Report, Index, Adult Overview, Intake/Output, MAR, Recent Results, Cardiac Rhythm NSR, and Event Log.     0730: Per MD- 0600 tube feeds are to remain held due to patient vomiting up tube feed last night (6/13).     1900: Bedside and Verbal shift change report given to Marvin ROSALES (oncoming nurse) by Tara ROSALES (offgoing nurse). Report included the following information Nurse Handoff Report, Index, Adult Overview, Intake/Output, MAR, Recent Results, Cardiac Rhythm NSR, and Event Log.

## 2024-06-15 LAB
ANION GAP SERPL CALC-SCNC: 3 MMOL/L (ref 5–15)
BASOPHILS # BLD: 0 K/UL (ref 0–0.1)
BASOPHILS NFR BLD: 0 % (ref 0–1)
BUN SERPL-MCNC: 20 MG/DL (ref 6–20)
BUN/CREAT SERPL: 31 (ref 12–20)
CALCIUM SERPL-MCNC: 8.7 MG/DL (ref 8.5–10.1)
CHLORIDE SERPL-SCNC: 109 MMOL/L (ref 97–108)
CO2 SERPL-SCNC: 30 MMOL/L (ref 21–32)
CREAT SERPL-MCNC: 0.64 MG/DL (ref 0.7–1.3)
DIFFERENTIAL METHOD BLD: ABNORMAL
EOSINOPHIL # BLD: 0 K/UL (ref 0–0.4)
EOSINOPHIL NFR BLD: 0 % (ref 0–7)
ERYTHROCYTE [DISTWIDTH] IN BLOOD BY AUTOMATED COUNT: 12.4 % (ref 11.5–14.5)
GLUCOSE SERPL-MCNC: 139 MG/DL (ref 65–100)
HCT VFR BLD AUTO: 41.9 % (ref 36.6–50.3)
HGB BLD-MCNC: 14.3 G/DL (ref 12.1–17)
IMM GRANULOCYTES # BLD AUTO: 0.2 K/UL (ref 0–0.04)
IMM GRANULOCYTES NFR BLD AUTO: 1 % (ref 0–0.5)
LYMPHOCYTES # BLD: 0.5 K/UL (ref 0.8–3.5)
LYMPHOCYTES NFR BLD: 3 % (ref 12–49)
MCH RBC QN AUTO: 32.1 PG (ref 26–34)
MCHC RBC AUTO-ENTMCNC: 34.1 G/DL (ref 30–36.5)
MCV RBC AUTO: 93.9 FL (ref 80–99)
MONOCYTES # BLD: 0.4 K/UL (ref 0–1)
MONOCYTES NFR BLD: 2 % (ref 5–13)
NEUTS SEG # BLD: 17.2 K/UL (ref 1.8–8)
NEUTS SEG NFR BLD: 94 % (ref 32–75)
NRBC # BLD: 0 K/UL (ref 0–0.01)
NRBC BLD-RTO: 0 PER 100 WBC
NT PRO BNP: 676 PG/ML
PLATELET # BLD AUTO: 248 K/UL (ref 150–400)
PMV BLD AUTO: 9.4 FL (ref 8.9–12.9)
POTASSIUM SERPL-SCNC: 3.6 MMOL/L (ref 3.5–5.1)
RBC # BLD AUTO: 4.46 M/UL (ref 4.1–5.7)
RBC MORPH BLD: ABNORMAL
SODIUM SERPL-SCNC: 142 MMOL/L (ref 136–145)
WBC # BLD AUTO: 18.3 K/UL (ref 4.1–11.1)

## 2024-06-15 PROCEDURE — 36415 COLL VENOUS BLD VENIPUNCTURE: CPT

## 2024-06-15 PROCEDURE — C9113 INJ PANTOPRAZOLE SODIUM, VIA: HCPCS | Performed by: INTERNAL MEDICINE

## 2024-06-15 PROCEDURE — 94640 AIRWAY INHALATION TREATMENT: CPT

## 2024-06-15 PROCEDURE — 6370000000 HC RX 637 (ALT 250 FOR IP): Performed by: HOSPITALIST

## 2024-06-15 PROCEDURE — 2500000003 HC RX 250 WO HCPCS: Performed by: INTERNAL MEDICINE

## 2024-06-15 PROCEDURE — 2580000003 HC RX 258: Performed by: INTERNAL MEDICINE

## 2024-06-15 PROCEDURE — 85025 COMPLETE CBC W/AUTO DIFF WBC: CPT

## 2024-06-15 PROCEDURE — 6370000000 HC RX 637 (ALT 250 FOR IP)

## 2024-06-15 PROCEDURE — 6370000000 HC RX 637 (ALT 250 FOR IP): Performed by: INTERNAL MEDICINE

## 2024-06-15 PROCEDURE — 6360000002 HC RX W HCPCS: Performed by: HOSPITALIST

## 2024-06-15 PROCEDURE — 83880 ASSAY OF NATRIURETIC PEPTIDE: CPT

## 2024-06-15 PROCEDURE — 94667 MNPJ CHEST WALL 1ST: CPT

## 2024-06-15 PROCEDURE — 2580000003 HC RX 258: Performed by: HOSPITALIST

## 2024-06-15 PROCEDURE — 2700000000 HC OXYGEN THERAPY PER DAY

## 2024-06-15 PROCEDURE — 6360000002 HC RX W HCPCS: Performed by: STUDENT IN AN ORGANIZED HEALTH CARE EDUCATION/TRAINING PROGRAM

## 2024-06-15 PROCEDURE — 2060000000 HC ICU INTERMEDIATE R&B

## 2024-06-15 PROCEDURE — 6360000002 HC RX W HCPCS: Performed by: INTERNAL MEDICINE

## 2024-06-15 PROCEDURE — 94761 N-INVAS EAR/PLS OXIMETRY MLT: CPT

## 2024-06-15 PROCEDURE — 80048 BASIC METABOLIC PNL TOTAL CA: CPT

## 2024-06-15 RX ORDER — FUROSEMIDE 10 MG/ML
20 INJECTION INTRAMUSCULAR; INTRAVENOUS ONCE
Status: COMPLETED | OUTPATIENT
Start: 2024-06-15 | End: 2024-06-15

## 2024-06-15 RX ADMIN — LORAZEPAM 2 MG: 1 TABLET ORAL at 21:28

## 2024-06-15 RX ADMIN — GLYCOPYRROLATE 1 MG: 1 TABLET ORAL at 14:15

## 2024-06-15 RX ADMIN — BACLOFEN 10 MG: 10 TABLET ORAL at 21:28

## 2024-06-15 RX ADMIN — GUAIFENESIN 400 MG: 200 SOLUTION ORAL at 21:28

## 2024-06-15 RX ADMIN — GLYCOPYRROLATE 1 MG: 1 TABLET ORAL at 08:39

## 2024-06-15 RX ADMIN — WATER 40 MG: 1 INJECTION INTRAMUSCULAR; INTRAVENOUS; SUBCUTANEOUS at 06:09

## 2024-06-15 RX ADMIN — ACETAMINOPHEN 650 MG: 160 SOLUTION ORAL at 08:41

## 2024-06-15 RX ADMIN — IPRATROPIUM BROMIDE AND ALBUTEROL SULFATE 1 DOSE: .5; 3 SOLUTION RESPIRATORY (INHALATION) at 11:35

## 2024-06-15 RX ADMIN — IPRATROPIUM BROMIDE 2 SPRAY: 21 SPRAY NASAL at 21:29

## 2024-06-15 RX ADMIN — RISPERIDONE 1 MG: 1 SOLUTION ORAL at 14:16

## 2024-06-15 RX ADMIN — CETIRIZINE HYDROCHLORIDE 5 MG: 10 TABLET, FILM COATED ORAL at 08:40

## 2024-06-15 RX ADMIN — GLYCOPYRROLATE 1 MG: 1 TABLET ORAL at 21:27

## 2024-06-15 RX ADMIN — METRONIDAZOLE 500 MG: 500 INJECTION, SOLUTION INTRAVENOUS at 16:50

## 2024-06-15 RX ADMIN — AZELASTINE HYDROCHLORIDE 2 SPRAY: 137 SPRAY, METERED NASAL at 08:42

## 2024-06-15 RX ADMIN — SODIUM CHLORIDE, PRESERVATIVE FREE 10 ML: 5 INJECTION INTRAVENOUS at 21:25

## 2024-06-15 RX ADMIN — FUROSEMIDE 20 MG: 10 INJECTION, SOLUTION INTRAMUSCULAR; INTRAVENOUS at 15:00

## 2024-06-15 RX ADMIN — IPRATROPIUM BROMIDE AND ALBUTEROL SULFATE 1 DOSE: .5; 3 SOLUTION RESPIRATORY (INHALATION) at 19:59

## 2024-06-15 RX ADMIN — Medication 1 CAPSULE: at 08:40

## 2024-06-15 RX ADMIN — IPRATROPIUM BROMIDE AND ALBUTEROL SULFATE 1 DOSE: .5; 3 SOLUTION RESPIRATORY (INHALATION) at 07:22

## 2024-06-15 RX ADMIN — RISPERIDONE 1 MG: 1 SOLUTION ORAL at 08:42

## 2024-06-15 RX ADMIN — SODIUM CHLORIDE, PRESERVATIVE FREE 40 MG: 5 INJECTION INTRAVENOUS at 08:40

## 2024-06-15 RX ADMIN — METRONIDAZOLE 500 MG: 500 INJECTION, SOLUTION INTRAVENOUS at 09:08

## 2024-06-15 RX ADMIN — BACLOFEN 10 MG: 10 TABLET ORAL at 14:15

## 2024-06-15 RX ADMIN — Medication 300 MG: at 08:40

## 2024-06-15 RX ADMIN — METRONIDAZOLE 500 MG: 500 INJECTION, SOLUTION INTRAVENOUS at 01:02

## 2024-06-15 RX ADMIN — SODIUM CHLORIDE, PRESERVATIVE FREE 40 MG: 5 INJECTION INTRAVENOUS at 21:25

## 2024-06-15 RX ADMIN — WATER 1000 MG: 1 INJECTION INTRAMUSCULAR; INTRAVENOUS; SUBCUTANEOUS at 15:36

## 2024-06-15 RX ADMIN — WATER 40 MG: 1 INJECTION INTRAMUSCULAR; INTRAVENOUS; SUBCUTANEOUS at 14:16

## 2024-06-15 RX ADMIN — BACLOFEN 10 MG: 10 TABLET ORAL at 08:40

## 2024-06-15 RX ADMIN — IPRATROPIUM BROMIDE 2 SPRAY: 21 SPRAY NASAL at 08:43

## 2024-06-15 RX ADMIN — MIRTAZAPINE 15 MG: 15 TABLET, FILM COATED ORAL at 21:28

## 2024-06-15 RX ADMIN — ACETAMINOPHEN 650 MG: 160 SOLUTION ORAL at 21:28

## 2024-06-15 RX ADMIN — RISPERIDONE 1 MG: 1 SOLUTION ORAL at 21:28

## 2024-06-15 RX ADMIN — AZITHROMYCIN MONOHYDRATE 500 MG: 500 INJECTION, POWDER, LYOPHILIZED, FOR SOLUTION INTRAVENOUS at 16:55

## 2024-06-15 RX ADMIN — GUAIFENESIN 400 MG: 200 SOLUTION ORAL at 08:41

## 2024-06-15 RX ADMIN — AZELASTINE HYDROCHLORIDE 2 SPRAY: 137 SPRAY, METERED NASAL at 21:29

## 2024-06-15 RX ADMIN — ENOXAPARIN SODIUM 30 MG: 100 INJECTION SUBCUTANEOUS at 08:42

## 2024-06-15 RX ADMIN — SODIUM CHLORIDE, PRESERVATIVE FREE 10 ML: 5 INJECTION INTRAVENOUS at 08:44

## 2024-06-15 RX ADMIN — LORAZEPAM 2 MG: 1 TABLET ORAL at 08:40

## 2024-06-15 RX ADMIN — IPRATROPIUM BROMIDE AND ALBUTEROL SULFATE 1 DOSE: .5; 3 SOLUTION RESPIRATORY (INHALATION) at 16:31

## 2024-06-15 ASSESSMENT — PAIN SCALES - WONG BAKER
WONGBAKER_NUMERICALRESPONSE: NO HURT

## 2024-06-15 NOTE — PLAN OF CARE
Problem: Discharge Planning  Goal: Discharge to home or other facility with appropriate resources  Outcome: Progressing     Problem: Safety - Adult  Goal: Free from fall injury  Outcome: Progressing     Problem: Nutrition Deficit:  Goal: Optimize nutritional status  Outcome: Progressing     Problem: Pain  Goal: Verbalizes/displays adequate comfort level or baseline comfort level  Outcome: Progressing     Problem: Skin/Tissue Integrity  Goal: Absence of new skin breakdown  Description: 1.  Monitor for areas of redness and/or skin breakdown  2.  Assess vascular access sites hourly  3.  Every 4-6 hours minimum:  Change oxygen saturation probe site  4.  Every 4-6 hours:  If on nasal continuous positive airway pressure, respiratory therapy assess nares and determine need for appliance change or resting period.  Outcome: Progressing

## 2024-06-15 NOTE — PROGRESS NOTES
Spiritual Care Assessment/Progress Note  [facility]    Name: Ayden Simpson MRN: [id number]    Age: 58 y.o.     Sex: male   Language: English     Date: 6/15/2024            Total Time Calculated: 10 min              Spiritual Assessment begun in [facility] B3 INTERMEDIATE CARE UNIT  Service Provided For: Patient     Encounter Overview/Reason: Attempted Encounter    Spiritual beliefs:      [] Involved in a eddie tradition/spiritual practice:      [] Supported by a eddie community:      [] Claims no spiritual orientation:      [] Seeking spiritual identity:           [] Adheres to an individual form of spirituality:      [x] Not able to assess:                Identified resources for coping and support system:   Support System: Unknown       [] Prayer                  [] Devotional reading               [] Music                  [] Guided Imagery     [] Pet visits                                        [] Other: (COMMENT)     Specific area/focus of visit   Encounter:    Crisis:    Spiritual/Emotional needs:    Ritual, Rites and Sacraments:    Grief, Loss, and Adjustments:    Ethics/Mediation:    Behavioral Health:    Palliative Care:    Advance Care Planning:      Plan/Referrals: Other (Comment) (Please contact [facility] for further consults.)    Narrative:   visit for the patient on IMCU. Reviewed pt's chart. Upon arrival, pt was being attended to by medical staff. Chart indicates no Gnosticist preference. Offered words of good intent for comfort and peace. Please contact [facility] for further referrals.    Chaplain Cunha MS, MDiv, [facility]  Spiritual Health Services  Paging service: [phone] ([email or web address])

## 2024-06-15 NOTE — PROGRESS NOTES
[facility]  [address] [place] [address] [phone]        Hospitalist Progress Note      NAME: Ayden Simpson   :  1965  MRM:  [id number]    Date/Time of service: 6/15/2024  2:29 PM       Subjective:     Chief Complaint:  Patient was personally seen and examined by me during this time period.  Chart reviewed.  F/up AHRF 2/2 PNA    No subjective this am       Objective:       Vitals:       Last 24hrs VS reviewed since prior progress note. Most recent are:    Vitals:    06/15/24 1205   BP: 130/69   Pulse: 90   Resp: 22   Temp: 98.4 °F (36.9 °C)   SpO2: 94%     SpO2 Readings from Last 6 Encounters:   06/15/24 94%          Intake/Output Summary (Last 24 hours) at 6/15/2024 1429  Last data filed at 6/15/2024 0605  Gross per 24 hour   Intake 210 ml   Output 1375 ml   Net -1165 ml        Exam:     Physical Exam:     Gen: frail male. No increased WOB  HEENT:  Pink conjunctivae, EOMI, hearing intact to voice, moist mucous membranes  Neck:  Supple, without masses, thyroid non-tender  Resp:  Ant fields CTAB with transmitted upper breath sounds. No increased WOB  Card:  No murmurs, normal S1, S2 without thrills, bruits or peripheral edema  Abd: PEG tube in place   Musc:  No cyanosis or clubbing  Skin:  No rashes or ulcers, skin turgor is good  Neuro: Contracted extremities. Grunts to verbalize otherwise non-verbal   Psych: Poor insight.alert      Medications Reviewed: (see below)    Lab Data Reviewed: (see below)    ______________________________________________________________________    Medications:     Current Facility-Administered Medications   Medication Dose Route Frequency    ondansetron (ZOFRAN) injection 4 mg  4 mg IntraVENous Q6H PRN    LORazepam (ATIVAN) tablet 2 mg  2 mg Per G Tube BID    LORazepam (ATIVAN) injection 0.5 mg  0.5 mg IntraVENous Q6H PRN    glycopyrrolate (ROBINUL) injection 0.1 mg  0.1 mg IntraVENous Q4H PRN    lactobacillus (CULTURELLE) capsule 1  (LOVENOX) injection 30 mg  30 mg SubCUTAneous Daily    acetaminophen (TYLENOL) tablet 650 mg  650 mg Oral Q6H PRN    Or    acetaminophen (TYLENOL) suppository 650 mg  650 mg Rectal Q6H PRN    glycopyrrolate (ROBINUL) tablet 1 mg  1 mg Per G Tube TID          Lab Review:     Recent Labs     06/13/24  0626 06/14/24  0530 06/15/24  0428   WBC 26.3* 22.8* 18.3*   HGB 15.9 14.5 14.3   HCT 46.1 43.6 41.9    257 248     Recent Labs     06/12/24  1628 06/13/24  0626 06/14/24  0530 06/15/24  0428    142 144 142   K 5.2* 3.9 4.3 3.6    113* 114* 109*   CO2 22 24 25 30   BUN 26* 17 25* 20   MG 2.4 2.2 2.1  --    PHOS  --  2.7 2.8  --    ALT 75* 60 52  --      No results found for: \"GLUCPOC\"       Assessment / Plan:     Patient is a 58-year-old male with history of cerebral palsy a/w with acute hypoxemic respiratory failure, sepsis due to left lower lobe pneumonia      Acute hypoxemic respiratory failure: POA. Acute. Secondary to pneumonia with likely aspiration contributing as well. He had stridor yesterday and likely intermittently will today as well but sounded ok on my exam. BNP below cutoff x2. CXR with diffuse interstitial opacities and focal infiltrate on LLL.  - Cont CTX and azithro for CAP and flagyl for aspiration  - continue scheduled nebs  - on guaifenesin   - chest PT as able   - PRN racemic epi for stridor   - continue glycopyrrolate   - On humidified high flow currently. Wean as able  - Will get pulm to weigh in    Likely reflux/aspiration contributing to the above / vomiting: POA. Vomiting likely from PNA as KUB not suggestive of Sbo ileus.  -TF's on hold for now   - continue free water flushes   - anti-emetics PRN   - Gensurg eval to see if he is a good candidate for transition to J tube     Sepsis: POA. Resolved. As evidenced by SIRS criteria and source (CAP). Lactate WNL   - stop IVF's as receiving volume through PEG free water flushes      Cerebral palsy: POA. Chronic. Stable  - Cont home

## 2024-06-15 NOTE — CONSULTS
Name: Ayden Simpson Hospital: [facility]   : 1965 Admit Date: 2024   Phone: [phone]  Room: [facility]/   PCP: Lizzie Durham APRN - CNP  MRN: [id number]   Date: 6/15/2024  Code: Full Code          Chart and notes reviewed. Data reviewed. I review the patient's current medications in the medical record at each encounter.  I have evaluated and examined the patient.     HPI:    4:04 PM       History was obtained from chart.      I was asked by  to see Ayden Simpson in consultation for a chief complaint of pneumonia and respiratory failure.      Mr. Simpson is a 58 year old male with history of cerebral palsy (nonverbal, chronic encephalopathy/microcephaly), bladder mass, and GERD, admitted to [place] on  with respiratory failure and pneumonia.  He was initially treated with CTX/azithro with Flagyl subsequently added.  Stridor also noted following admission.  Overnight on , he had an episode of vomiting after completion of tube feeling.  Tube feeds have been held since that time.  Surgery has been consulted to opine of transition to J tube due to concerns for reflux.  He is now on HF O2 at 30L and 30% FiO2.  Primary team discussed GOC with POA who feels patient has a good quality of life at baseline wishes for patient to remain full cide, including CPR/intubation if needed.     Not able to obtain hx/ROS from patient due to underlying medical condition.     chest CT personally reviewed.  There is patchy groundglass infiltrate in the left upper and left lower lobes.     CXR personally reviewed.  Lung volumes are low, with persistent left basilar infiltrate and diffuse increasing interstitial infiltrate.      KUB: Nonobstructive bowel gas pattern.    proBNP 676  WBC 18.3 - trending down since admission (29.5)  Blood cx no growth x 2 days  MRSA screen neg   ABG 7.44/33/101/22      Past Medical History:   Diagnosis Date    Anxiety     Cerebral palsy (HCC)        Chest wall:  No tenderness or deformity.   Heart:  Regular rate and rhythm, S1, S2 normal, no murmur, click, rub or gallop.   Abdomen:   Soft, non-tender. PEG tube in place.  Bowel sounds normal. No masses,  No organomegaly.   Extremities: Extremities contracted, trace pedal edema, no cyanosis.   Pulses: 2+ and symmetric all extremities.   Skin: Skin color, texture, turgor normal. No rashes or lesions   Lymph nodes: Cervical, supraclavicular nodes normal.   Neurologic: Unable to evaluate, makes grunting sound, poor insight, chronic encephalopathy.       Lab Results   Component Value Date/Time     06/15/2024 04:28 AM    K 3.6 06/15/2024 04:28 AM     06/15/2024 04:28 AM    CO2 30 06/15/2024 04:28 AM    BUN 20 06/15/2024 04:28 AM    MG 2.1 06/14/2024 05:30 AM    PHOS 2.8 06/14/2024 05:30 AM       Lab Results   Component Value Date/Time    WBC 18.3 06/15/2024 04:28 AM    HGB 14.3 06/15/2024 04:28 AM     06/15/2024 04:28 AM    MCV 93.9 06/15/2024 04:28 AM       Lab Results   Component Value Date/Time    GLOB 3.3 06/14/2024 05:30 AM       Lab Results   Component Value Date/Time    IRON 83 07/21/2023 09:39 AM    TIBC 409 07/21/2023 09:39 AM         IMPRESSION  Acute hypoxic respiratory failure  Pneumonia - CAP vs aspiration  GERD with vomiting episode of tube feeds  Cerebral palsy  Debility    PLAN  Wean HF as able to keep sats > 90%; current on 30L FiO2 30%  Agree with IV abx for CAP and Flagyl to cover for potential aspiration.  Fu cultures.  Guaifenesin scheduled and chest PT as tolerated  Scheduled Duonebs  Wean IVCS  General surgery consulted per primary team to weigh in on transition to J tube due to concerns for reflux.   IV Protonix  Keep HOB elevated  Repeat chest imaging in 6-8 weeks.  Per POA, patient to remain FULL CODE including CPR/intubation.  Palliative care has also been consulted per primary team.    Patient increased risk for further decompensation.  CC time 35+ min.    Thank you for

## 2024-06-15 NOTE — PLAN OF CARE
Problem: Discharge Planning  Goal: Discharge to home or other facility with appropriate resources  6/15/2024 1115 by Tara Long RN  Outcome: Progressing  6/15/2024 0143 by Marvin Shen RN  Outcome: Progressing     Problem: Safety - Adult  Goal: Free from fall injury  6/15/2024 1115 by Tara Long RN  Outcome: Progressing  6/15/2024 0143 by Marvin Shen RN  Outcome: Progressing     Problem: Nutrition Deficit:  Goal: Optimize nutritional status  6/15/2024 1115 by Tara Long RN  Outcome: Progressing  6/15/2024 0143 by Marvin Shen RN  Outcome: Progressing     Problem: Pain  Goal: Verbalizes/displays adequate comfort level or baseline comfort level  6/15/2024 1115 by Tara Long RN  Outcome: Progressing  6/15/2024 0143 by Marvin Shen RN  Outcome: Progressing     Problem: Skin/Tissue Integrity  Goal: Absence of new skin breakdown  Description: 1.  Monitor for areas of redness and/or skin breakdown  2.  Assess vascular access sites hourly  3.  Every 4-6 hours minimum:  Change oxygen saturation probe site  4.  Every 4-6 hours:  If on nasal continuous positive airway pressure, respiratory therapy assess nares and determine need for appliance change or resting period.  6/15/2024 1115 by Tara Long RN  Outcome: Progressing  6/15/2024 0143 by Marvin Shen RN  Outcome: Progressing

## 2024-06-15 NOTE — CONSULTS
Department of General Surgery - Adult  Surgical Service Consult Note      Reason for Consult:  possible j-tube  Requesting Physician:  Dr. Walker      History Obtained From:  non-family caregiver, electronic medical record    HISTORY OF PRESENT ILLNESS:                The patient is a 58 y.o. male who presents with respiratory failure.  He has a gastrostomy tube through which he receives enteral nutrition.  He was admitted with a pneumonia.  It was noted that overnight he vomited tube feeds.  In speaking with his caregiver, he brings up tube feeds not infrequently.  She is wondering about changing to a j-tube to decrease the chance of aspiration.    Past Medical History:        Diagnosis Date    Anxiety     Cerebral palsy (HCC)     Contracted, joint, multiple sites     upper extremities due to cerebral palsy    Developmental non-verbal disorder     Encephalopathy chronic     GERD (gastroesophageal reflux disease)     Hip dysplasia     Ill-defined condition     cerebral palsy    Intellectual disability     Mass of bladder     Microcephaly (HCC)     Muscle spasm     upper and lower extremities     Past Surgical History:        Procedure Laterality Date    MO UNLISTED PROCEDURE ABDOMEN PERITONEUM & OMENTUM      peg     Current Medications:   Current Facility-Administered Medications: [START ON 6/16/2024] methylPREDNISolone sodium succ (SOLU-MEDROL) 40 mg in sterile water 1 mL injection, 40 mg, IntraVENous, Q12H  ondansetron (ZOFRAN) injection 4 mg, 4 mg, IntraVENous, Q6H PRN  LORazepam (ATIVAN) tablet 2 mg, 2 mg, Per G Tube, BID  LORazepam (ATIVAN) injection 0.5 mg, 0.5 mg, IntraVENous, Q6H PRN  glycopyrrolate (ROBINUL) injection 0.1 mg, 0.1 mg, IntraVENous, Q4H PRN  lactobacillus (CULTURELLE) capsule 1 capsule, 1 capsule, PEG Tube, Daily with breakfast  metroNIDAZOLE (FLAGYL) 500 mg in 0.9% NaCl 100 mL IVPB premix, 500 mg, IntraVENous, Q8H  sodium chloride nebulizer 0.9 % solution 3 mL, 3 mL, Nebulization, Q4H  Will discuss further with my partners on Monday.

## 2024-06-15 NOTE — PROGRESS NOTES
0700: Bedside and Verbal shift change report given to Tara RN (oncoming nurse) by Marvin RN (offgoing nurse). Report included the following information Nurse Handoff Report, Index, Adult Overview, Intake/Output, MAR, Recent Results, Cardiac Rhythm NSR, and Event Log.     1900: Bedside and Verbal shift change report given to Marvin RN (oncoming nurse) by Tara RN (offgoing nurse). Report included the following information Nurse Handoff Report, Index, Adult Overview, Intake/Output, MAR, Recent Results, Cardiac Rhythm NSR, and Event Log.

## 2024-06-16 LAB
ALBUMIN SERPL-MCNC: 2.9 G/DL (ref 3.5–5)
ALBUMIN/GLOB SERPL: 0.9 (ref 1.1–2.2)
ALP SERPL-CCNC: 88 U/L (ref 45–117)
ALT SERPL-CCNC: 44 U/L (ref 12–78)
ANION GAP SERPL CALC-SCNC: 5 MMOL/L (ref 5–15)
AST SERPL-CCNC: 23 U/L (ref 15–37)
BASOPHILS # BLD: 0 K/UL (ref 0–0.1)
BASOPHILS NFR BLD: 0 % (ref 0–1)
BILIRUB SERPL-MCNC: 0.9 MG/DL (ref 0.2–1)
BUN SERPL-MCNC: 22 MG/DL (ref 6–20)
BUN/CREAT SERPL: 32 (ref 12–20)
CALCIUM SERPL-MCNC: 8.8 MG/DL (ref 8.5–10.1)
CHLORIDE SERPL-SCNC: 104 MMOL/L (ref 97–108)
CO2 SERPL-SCNC: 29 MMOL/L (ref 21–32)
CREAT SERPL-MCNC: 0.68 MG/DL (ref 0.7–1.3)
DIFFERENTIAL METHOD BLD: ABNORMAL
EOSINOPHIL # BLD: 0 K/UL (ref 0–0.4)
EOSINOPHIL NFR BLD: 0 % (ref 0–7)
ERYTHROCYTE [DISTWIDTH] IN BLOOD BY AUTOMATED COUNT: 12.3 % (ref 11.5–14.5)
GLOBULIN SER CALC-MCNC: 3.1 G/DL (ref 2–4)
GLUCOSE SERPL-MCNC: 128 MG/DL (ref 65–100)
HCT VFR BLD AUTO: 45.4 % (ref 36.6–50.3)
HGB BLD-MCNC: 15.6 G/DL (ref 12.1–17)
IMM GRANULOCYTES # BLD AUTO: 0.1 K/UL (ref 0–0.04)
IMM GRANULOCYTES NFR BLD AUTO: 1 % (ref 0–0.5)
LYMPHOCYTES # BLD: 0.6 K/UL (ref 0.8–3.5)
LYMPHOCYTES NFR BLD: 5 % (ref 12–49)
MAGNESIUM SERPL-MCNC: 2.2 MG/DL (ref 1.6–2.4)
MCH RBC QN AUTO: 32 PG (ref 26–34)
MCHC RBC AUTO-ENTMCNC: 34.4 G/DL (ref 30–36.5)
MCV RBC AUTO: 93 FL (ref 80–99)
MONOCYTES # BLD: 0.5 K/UL (ref 0–1)
MONOCYTES NFR BLD: 4 % (ref 5–13)
NEUTS SEG # BLD: 11.4 K/UL (ref 1.8–8)
NEUTS SEG NFR BLD: 90 % (ref 32–75)
NRBC # BLD: 0 K/UL (ref 0–0.01)
NRBC BLD-RTO: 0 PER 100 WBC
PLATELET # BLD AUTO: 253 K/UL (ref 150–400)
PMV BLD AUTO: 9.4 FL (ref 8.9–12.9)
POTASSIUM SERPL-SCNC: 3.4 MMOL/L (ref 3.5–5.1)
PROT SERPL-MCNC: 6 G/DL (ref 6.4–8.2)
RBC # BLD AUTO: 4.88 M/UL (ref 4.1–5.7)
RBC MORPH BLD: ABNORMAL
SODIUM SERPL-SCNC: 138 MMOL/L (ref 136–145)
WBC # BLD AUTO: 12.6 K/UL (ref 4.1–11.1)

## 2024-06-16 PROCEDURE — 6360000002 HC RX W HCPCS: Performed by: STUDENT IN AN ORGANIZED HEALTH CARE EDUCATION/TRAINING PROGRAM

## 2024-06-16 PROCEDURE — 6370000000 HC RX 637 (ALT 250 FOR IP): Performed by: INTERNAL MEDICINE

## 2024-06-16 PROCEDURE — 6370000000 HC RX 637 (ALT 250 FOR IP): Performed by: HOSPITALIST

## 2024-06-16 PROCEDURE — 2060000000 HC ICU INTERMEDIATE R&B

## 2024-06-16 PROCEDURE — 2580000003 HC RX 258: Performed by: INTERNAL MEDICINE

## 2024-06-16 PROCEDURE — 2700000000 HC OXYGEN THERAPY PER DAY

## 2024-06-16 PROCEDURE — 36415 COLL VENOUS BLD VENIPUNCTURE: CPT

## 2024-06-16 PROCEDURE — 2580000003 HC RX 258: Performed by: PHYSICIAN ASSISTANT

## 2024-06-16 PROCEDURE — 6360000002 HC RX W HCPCS: Performed by: PHYSICIAN ASSISTANT

## 2024-06-16 PROCEDURE — 94640 AIRWAY INHALATION TREATMENT: CPT

## 2024-06-16 PROCEDURE — 6360000002 HC RX W HCPCS: Performed by: INTERNAL MEDICINE

## 2024-06-16 PROCEDURE — C9113 INJ PANTOPRAZOLE SODIUM, VIA: HCPCS | Performed by: INTERNAL MEDICINE

## 2024-06-16 PROCEDURE — P9047 ALBUMIN (HUMAN), 25%, 50ML: HCPCS | Performed by: STUDENT IN AN ORGANIZED HEALTH CARE EDUCATION/TRAINING PROGRAM

## 2024-06-16 PROCEDURE — 2580000003 HC RX 258: Performed by: HOSPITALIST

## 2024-06-16 PROCEDURE — 85025 COMPLETE CBC W/AUTO DIFF WBC: CPT

## 2024-06-16 PROCEDURE — 94668 MNPJ CHEST WALL SBSQ: CPT

## 2024-06-16 PROCEDURE — 6360000002 HC RX W HCPCS: Performed by: HOSPITALIST

## 2024-06-16 PROCEDURE — 6370000000 HC RX 637 (ALT 250 FOR IP)

## 2024-06-16 PROCEDURE — 80053 COMPREHEN METABOLIC PANEL: CPT

## 2024-06-16 PROCEDURE — 2500000003 HC RX 250 WO HCPCS: Performed by: INTERNAL MEDICINE

## 2024-06-16 PROCEDURE — 83735 ASSAY OF MAGNESIUM: CPT

## 2024-06-16 PROCEDURE — 94761 N-INVAS EAR/PLS OXIMETRY MLT: CPT

## 2024-06-16 RX ORDER — ALBUMIN (HUMAN) 12.5 G/50ML
25 SOLUTION INTRAVENOUS EVERY 12 HOURS
Status: COMPLETED | OUTPATIENT
Start: 2024-06-16 | End: 2024-06-18

## 2024-06-16 RX ADMIN — GUAIFENESIN 400 MG: 200 SOLUTION ORAL at 22:03

## 2024-06-16 RX ADMIN — IPRATROPIUM BROMIDE AND ALBUTEROL SULFATE 1 DOSE: .5; 3 SOLUTION RESPIRATORY (INHALATION) at 15:48

## 2024-06-16 RX ADMIN — BACLOFEN 10 MG: 10 TABLET ORAL at 22:03

## 2024-06-16 RX ADMIN — GLYCOPYRROLATE 1 MG: 1 TABLET ORAL at 09:28

## 2024-06-16 RX ADMIN — LORAZEPAM 2 MG: 1 TABLET ORAL at 08:25

## 2024-06-16 RX ADMIN — RISPERIDONE 1 MG: 1 SOLUTION ORAL at 08:26

## 2024-06-16 RX ADMIN — METRONIDAZOLE 500 MG: 500 INJECTION, SOLUTION INTRAVENOUS at 17:08

## 2024-06-16 RX ADMIN — METRONIDAZOLE 500 MG: 500 INJECTION, SOLUTION INTRAVENOUS at 01:15

## 2024-06-16 RX ADMIN — CETIRIZINE HYDROCHLORIDE 5 MG: 10 TABLET, FILM COATED ORAL at 08:24

## 2024-06-16 RX ADMIN — IPRATROPIUM BROMIDE 2 SPRAY: 21 SPRAY NASAL at 22:06

## 2024-06-16 RX ADMIN — AZELASTINE HYDROCHLORIDE 2 SPRAY: 137 SPRAY, METERED NASAL at 22:06

## 2024-06-16 RX ADMIN — Medication 300 MG: at 08:26

## 2024-06-16 RX ADMIN — ALBUMIN (HUMAN) 25 G: 0.25 INJECTION, SOLUTION INTRAVENOUS at 11:44

## 2024-06-16 RX ADMIN — AZITHROMYCIN MONOHYDRATE 500 MG: 500 INJECTION, POWDER, LYOPHILIZED, FOR SOLUTION INTRAVENOUS at 17:07

## 2024-06-16 RX ADMIN — ALBUMIN (HUMAN) 25 G: 0.25 INJECTION, SOLUTION INTRAVENOUS at 22:15

## 2024-06-16 RX ADMIN — SODIUM CHLORIDE, PRESERVATIVE FREE 40 MG: 5 INJECTION INTRAVENOUS at 22:03

## 2024-06-16 RX ADMIN — IPRATROPIUM BROMIDE AND ALBUTEROL SULFATE 1 DOSE: .5; 3 SOLUTION RESPIRATORY (INHALATION) at 08:21

## 2024-06-16 RX ADMIN — ENOXAPARIN SODIUM 30 MG: 100 INJECTION SUBCUTANEOUS at 08:25

## 2024-06-16 RX ADMIN — BACLOFEN 10 MG: 10 TABLET ORAL at 08:25

## 2024-06-16 RX ADMIN — SODIUM CHLORIDE, PRESERVATIVE FREE 10 ML: 5 INJECTION INTRAVENOUS at 08:35

## 2024-06-16 RX ADMIN — ACETAMINOPHEN 650 MG: 160 SOLUTION ORAL at 22:00

## 2024-06-16 RX ADMIN — GLYCOPYRROLATE 1 MG: 1 TABLET ORAL at 13:43

## 2024-06-16 RX ADMIN — RISPERIDONE 1 MG: 1 SOLUTION ORAL at 13:44

## 2024-06-16 RX ADMIN — Medication 1 CAPSULE: at 08:24

## 2024-06-16 RX ADMIN — WATER 1000 MG: 1 INJECTION INTRAMUSCULAR; INTRAVENOUS; SUBCUTANEOUS at 16:13

## 2024-06-16 RX ADMIN — IPRATROPIUM BROMIDE AND ALBUTEROL SULFATE 1 DOSE: .5; 3 SOLUTION RESPIRATORY (INHALATION) at 20:03

## 2024-06-16 RX ADMIN — RISPERIDONE 1 MG: 1 SOLUTION ORAL at 21:00

## 2024-06-16 RX ADMIN — METRONIDAZOLE 500 MG: 500 INJECTION, SOLUTION INTRAVENOUS at 08:38

## 2024-06-16 RX ADMIN — ACETAMINOPHEN 650 MG: 160 SOLUTION ORAL at 08:26

## 2024-06-16 RX ADMIN — AZELASTINE HYDROCHLORIDE 2 SPRAY: 137 SPRAY, METERED NASAL at 08:35

## 2024-06-16 RX ADMIN — SODIUM CHLORIDE, PRESERVATIVE FREE 40 MG: 5 INJECTION INTRAVENOUS at 08:35

## 2024-06-16 RX ADMIN — LORAZEPAM 2 MG: 1 TABLET ORAL at 22:00

## 2024-06-16 RX ADMIN — IPRATROPIUM BROMIDE AND ALBUTEROL SULFATE 1 DOSE: .5; 3 SOLUTION RESPIRATORY (INHALATION) at 12:17

## 2024-06-16 RX ADMIN — GLYCOPYRROLATE 1 MG: 1 TABLET ORAL at 22:02

## 2024-06-16 RX ADMIN — GUAIFENESIN 400 MG: 200 SOLUTION ORAL at 08:26

## 2024-06-16 RX ADMIN — IPRATROPIUM BROMIDE 2 SPRAY: 21 SPRAY NASAL at 08:35

## 2024-06-16 RX ADMIN — SODIUM CHLORIDE, PRESERVATIVE FREE 10 ML: 5 INJECTION INTRAVENOUS at 22:04

## 2024-06-16 RX ADMIN — WATER 40 MG: 1 INJECTION INTRAMUSCULAR; INTRAVENOUS; SUBCUTANEOUS at 01:00

## 2024-06-16 RX ADMIN — WATER 40 MG: 1 INJECTION INTRAMUSCULAR; INTRAVENOUS; SUBCUTANEOUS at 11:46

## 2024-06-16 RX ADMIN — BACLOFEN 10 MG: 10 TABLET ORAL at 13:43

## 2024-06-16 RX ADMIN — MIRTAZAPINE 15 MG: 15 TABLET, FILM COATED ORAL at 22:02

## 2024-06-16 ASSESSMENT — PAIN SCALES - WONG BAKER
WONGBAKER_NUMERICALRESPONSE: NO HURT
WONGBAKER_NUMERICALRESPONSE: NO HURT

## 2024-06-16 NOTE — PROGRESS NOTES
RRT evaluation:pt is still unresponsive due to cerebral palsy, lung sound and heart sound are within in the normal range. Talked with primary RN,  no change during the day shift, keep current POC.     Sepsis Score 9    Predictive Model Details          9 (Low)  Factor Value    Calculated 6/15/2024 20:47 7% Total Active Inpatient Meds 32    [facility] EARLY DETECTION OF SEPSIS VERSION 2 Model 7% Respirations 24     6% WBC Count 18.3 K/uL     6% Age 58 years old     -5% Total Admins 130     -5% Duration of Encounter 3.2 days     5% Ayush Coma Scale 8     -4% Has Imaging Procedure 0     -4% Lactate 2.17 mmol/L     4% O2 Delivery Method NASAL CANNULA        Predictive Model Details          72 (Warning)  Factor Value    Calculated 6/15/2024 20:52 36% Ayush coma scale 8    Deterioration Index Model 15% Supplemental oxygen Heated high flow cannula     14% Respiratory rate 24     13% Age 58 years old     13% Neurological exam X     5% WBC count abnormal (18.3 K/uL)     1% Systolic 130     1% Sodium 142 mmol/L     1% Potassium 3.6 mmol/L     0% Pulse 84     0% Pulse oximetry 97 %     0% Hematocrit 41.9 %     0% Temperature 98.4 °F (36.9 °C)          Spoke with primary RN regarding pt status. Pt resting comfortably in bed with no s/s of distress. Denies CP or SOB. No further interventions at this time.     Lawson Anna, RN

## 2024-06-16 NOTE — PLAN OF CARE
Problem: Discharge Planning  Goal: Discharge to home or other facility with appropriate resources  6/16/2024 1059 by Tara Long RN  Outcome: Progressing  6/16/2024 0130 by Marvin Shen RN  Outcome: Progressing     Problem: Safety - Adult  Goal: Free from fall injury  6/16/2024 1059 by Tara Long RN  Outcome: Progressing  6/16/2024 0130 by Marvin Shen RN  Outcome: Progressing     Problem: Nutrition Deficit:  Goal: Optimize nutritional status  Outcome: Progressing     Problem: Pain  Goal: Verbalizes/displays adequate comfort level or baseline comfort level  Outcome: Progressing     Problem: Skin/Tissue Integrity  Goal: Absence of new skin breakdown  Description: 1.  Monitor for areas of redness and/or skin breakdown  2.  Assess vascular access sites hourly  3.  Every 4-6 hours minimum:  Change oxygen saturation probe site  4.  Every 4-6 hours:  If on nasal continuous positive airway pressure, respiratory therapy assess nares and determine need for appliance change or resting period.  6/16/2024 1059 by Tara Long RN  Outcome: Progressing  6/16/2024 0130 by Marvin Shen RN  Outcome: Progressing

## 2024-06-16 NOTE — PROGRESS NOTES
Name: Ayden Simpson Hospital: [facility]   : 1965 Admit Date: 2024   Phone: [phone]  Room: [facility]/   PCP: Lizzie Durham APRN - CNP  MRN: [id number]   Date: 2024  Code: Full Code          Chart and notes reviewed. Data reviewed. I review the patient's current medications in the medical record at each encounter.  I have evaluated and examined the patient.     HPI:    10:27 AM       History was obtained from chart.      I was asked by  to see Ayden Simpson in consultation for a chief complaint of pneumonia and respiratory failure.      Mr. Simpson is a 58 year old male with history of cerebral palsy (nonverbal, chronic encephalopathy/microcephaly), bladder mass, and GERD, admitted to [place] on  with respiratory failure and pneumonia.  He was initially treated with CTX/azithro with Flagyl subsequently added.  Stridor also noted following admission.  Overnight on , he had an episode of vomiting after completion of tube feeling.  Tube feeds have been held since that time.  Surgery has been consulted to opine of transition to J tube due to concerns for reflux.  He is now on HF O2 at 30L and 30% FiO2.  Primary team discussed GOC with POA who feels patient has a good quality of life at baseline wishes for patient to remain full cide, including CPR/intubation if needed.     Not able to obtain hx/ROS from patient due to underlying medical condition.     chest CT personally reviewed.  There is patchy groundglass infiltrate in the left upper and left lower lobes.     CXR personally reviewed.  Lung volumes are low, with persistent left basilar infiltrate and diffuse increasing interstitial infiltrate.      KUB: Nonobstructive bowel gas pattern.    proBNP 676  WBC 18.3 - trending down since admission (29.5)  Blood cx no growth x 2 days  MRSA screen neg   ABG 7.44/33/101/22    : Pt is nonverbal. I don't see clear settings on the HFNC. Per documentation, he is

## 2024-06-16 NOTE — PROGRESS NOTES
0700: Bedside and Verbal shift change report given to Tara RN (oncoming nurse) by Marvin RN (offgoing nurse). Report included the following information Nurse Handoff Report, Index, Adult Overview, Intake/Output, MAR, Recent Results, Cardiac Rhythm NSR, and Event Log.   1900: Bedside and Verbal shift change report given to Oleg RN (oncoming nurse) by Tara RN (offgoing nurse). Report included the following information Nurse Handoff Report, Index, Adult Overview, Intake/Output, MAR, Recent Results, Cardiac Rhythm NSR, and Event Log.

## 2024-06-16 NOTE — PROGRESS NOTES
[facility]ITO VALENZUEL[facility]  [address] [place] [address] [phone]        Hospitalist Progress Note      NAME: Ayden Simpson   :  1965  MRM:  [id number]    Date/Time of service: 2024  11:03 AM       Subjective:     Chief Complaint:  Patient was personally seen and examined by me during this time period.  Chart reviewed.  F/up AHRF 2/2 PNA    No subjective this am       Objective:       Vitals:       Last 24hrs VS reviewed since prior progress note. Most recent are:    Vitals:    24 0821   BP:    Pulse: 72   Resp: 16   Temp:    SpO2: 94%     SpO2 Readings from Last 6 Encounters:   24 94%          Intake/Output Summary (Last 24 hours) at 2024 1103  Last data filed at 2024 0328  Gross per 24 hour   Intake 210 ml   Output 1250 ml   Net -1040 ml          Exam:     Physical Exam:     Gen: frail male. No increased WOB  HEENT:  Pink conjunctivae, EOMI, hearing intact to voice, moist mucous membranes  Neck:  Supple, without masses, thyroid non-tender  Resp:  Ant fields CTAB with transmitted upper breath sounds. No increased WOB  Card:  No murmurs, normal S1, S2 without thrills, bruits or peripheral edema  Abd: PEG tube in place   Musc:  No cyanosis or clubbing  Skin:  No rashes or ulcers, skin turgor is good  Neuro: Contracted extremities. Grunts to verbalize otherwise non-verbal   Psych: Poor insight.alert      Medications Reviewed: (see below)    Lab Data Reviewed: (see below)    ______________________________________________________________________    Medications:     Current Facility-Administered Medications   Medication Dose Route Frequency    albumin human 25% IV solution 25 g  25 g IntraVENous Q12H    methylPREDNISolone sodium succ (SOLU-MEDROL) 40 mg in sterile water 1 mL injection  40 mg IntraVENous Q12H    ondansetron (ZOFRAN) injection 4 mg  4 mg IntraVENous Q6H PRN    LORazepam (ATIVAN) tablet 2 mg  2 mg Per G Tube BID    LORazepam (ATIVAN)

## 2024-06-17 ENCOUNTER — APPOINTMENT (OUTPATIENT)
Facility: HOSPITAL | Age: 59
End: 2024-06-17
Payer: MEDICAID

## 2024-06-17 LAB
ALBUMIN SERPL-MCNC: 3.6 G/DL (ref 3.5–5)
ALBUMIN/GLOB SERPL: 1.6 (ref 1.1–2.2)
ALP SERPL-CCNC: 59 U/L (ref 45–117)
ALT SERPL-CCNC: 74 U/L (ref 12–78)
ANION GAP SERPL CALC-SCNC: 2 MMOL/L (ref 5–15)
AST SERPL-CCNC: 79 U/L (ref 15–37)
BACTERIA SPEC CULT: NORMAL
BACTERIA SPEC CULT: NORMAL
BASOPHILS # BLD: 0 K/UL (ref 0–0.1)
BASOPHILS NFR BLD: 0 % (ref 0–1)
BILIRUB SERPL-MCNC: 0.6 MG/DL (ref 0.2–1)
BUN SERPL-MCNC: 18 MG/DL (ref 6–20)
BUN/CREAT SERPL: 30 (ref 12–20)
CALCIUM SERPL-MCNC: 8.6 MG/DL (ref 8.5–10.1)
CHLORIDE SERPL-SCNC: 108 MMOL/L (ref 97–108)
CO2 SERPL-SCNC: 30 MMOL/L (ref 21–32)
COMMENT:: NORMAL
CREAT SERPL-MCNC: 0.6 MG/DL (ref 0.7–1.3)
DIFFERENTIAL METHOD BLD: ABNORMAL
EOSINOPHIL # BLD: 0 K/UL (ref 0–0.4)
EOSINOPHIL NFR BLD: 0 % (ref 0–7)
ERYTHROCYTE [DISTWIDTH] IN BLOOD BY AUTOMATED COUNT: 12.1 % (ref 11.5–14.5)
GLOBULIN SER CALC-MCNC: 2.2 G/DL (ref 2–4)
GLUCOSE SERPL-MCNC: 121 MG/DL (ref 65–100)
HCT VFR BLD AUTO: 38.6 % (ref 36.6–50.3)
HGB BLD-MCNC: 13.3 G/DL (ref 12.1–17)
IMM GRANULOCYTES # BLD AUTO: 0.2 K/UL (ref 0–0.04)
IMM GRANULOCYTES NFR BLD AUTO: 2 % (ref 0–0.5)
LYMPHOCYTES # BLD: 0.5 K/UL (ref 0.8–3.5)
LYMPHOCYTES NFR BLD: 6 % (ref 12–49)
MAGNESIUM SERPL-MCNC: 2.4 MG/DL (ref 1.6–2.4)
MCH RBC QN AUTO: 31.9 PG (ref 26–34)
MCHC RBC AUTO-ENTMCNC: 34.5 G/DL (ref 30–36.5)
MCV RBC AUTO: 92.6 FL (ref 80–99)
MONOCYTES # BLD: 0.3 K/UL (ref 0–1)
MONOCYTES NFR BLD: 3 % (ref 5–13)
NEUTS SEG # BLD: 8.3 K/UL (ref 1.8–8)
NEUTS SEG NFR BLD: 89 % (ref 32–75)
NRBC # BLD: 0 K/UL (ref 0–0.01)
NRBC BLD-RTO: 0 PER 100 WBC
PLATELET # BLD AUTO: 221 K/UL (ref 150–400)
PMV BLD AUTO: 9.6 FL (ref 8.9–12.9)
POTASSIUM SERPL-SCNC: 3.1 MMOL/L (ref 3.5–5.1)
PROT SERPL-MCNC: 5.8 G/DL (ref 6.4–8.2)
RBC # BLD AUTO: 4.17 M/UL (ref 4.1–5.7)
SERVICE CMNT-IMP: NORMAL
SERVICE CMNT-IMP: NORMAL
SODIUM SERPL-SCNC: 140 MMOL/L (ref 136–145)
SPECIMEN HOLD: NORMAL
WBC # BLD AUTO: 9.4 K/UL (ref 4.1–11.1)

## 2024-06-17 PROCEDURE — 80074 ACUTE HEPATITIS PANEL: CPT

## 2024-06-17 PROCEDURE — 85025 COMPLETE CBC W/AUTO DIFF WBC: CPT

## 2024-06-17 PROCEDURE — P9047 ALBUMIN (HUMAN), 25%, 50ML: HCPCS | Performed by: STUDENT IN AN ORGANIZED HEALTH CARE EDUCATION/TRAINING PROGRAM

## 2024-06-17 PROCEDURE — 2580000003 HC RX 258: Performed by: INTERNAL MEDICINE

## 2024-06-17 PROCEDURE — 36415 COLL VENOUS BLD VENIPUNCTURE: CPT

## 2024-06-17 PROCEDURE — 6360000002 HC RX W HCPCS: Performed by: INTERNAL MEDICINE

## 2024-06-17 PROCEDURE — C9113 INJ PANTOPRAZOLE SODIUM, VIA: HCPCS | Performed by: INTERNAL MEDICINE

## 2024-06-17 PROCEDURE — 2580000003 HC RX 258: Performed by: STUDENT IN AN ORGANIZED HEALTH CARE EDUCATION/TRAINING PROGRAM

## 2024-06-17 PROCEDURE — 2580000003 HC RX 258: Performed by: HOSPITALIST

## 2024-06-17 PROCEDURE — 2580000003 HC RX 258: Performed by: PHYSICIAN ASSISTANT

## 2024-06-17 PROCEDURE — 6370000000 HC RX 637 (ALT 250 FOR IP): Performed by: INTERNAL MEDICINE

## 2024-06-17 PROCEDURE — 94640 AIRWAY INHALATION TREATMENT: CPT

## 2024-06-17 PROCEDURE — 2060000000 HC ICU INTERMEDIATE R&B

## 2024-06-17 PROCEDURE — 80053 COMPREHEN METABOLIC PANEL: CPT

## 2024-06-17 PROCEDURE — 83735 ASSAY OF MAGNESIUM: CPT

## 2024-06-17 PROCEDURE — 6360000002 HC RX W HCPCS: Performed by: PHYSICIAN ASSISTANT

## 2024-06-17 PROCEDURE — 94761 N-INVAS EAR/PLS OXIMETRY MLT: CPT

## 2024-06-17 PROCEDURE — 2700000000 HC OXYGEN THERAPY PER DAY

## 2024-06-17 PROCEDURE — 71045 X-RAY EXAM CHEST 1 VIEW: CPT

## 2024-06-17 PROCEDURE — 94669 MECHANICAL CHEST WALL OSCILL: CPT

## 2024-06-17 PROCEDURE — 6360000002 HC RX W HCPCS: Performed by: STUDENT IN AN ORGANIZED HEALTH CARE EDUCATION/TRAINING PROGRAM

## 2024-06-17 RX ORDER — POTASSIUM CHLORIDE 7.45 MG/ML
10 INJECTION INTRAVENOUS ONCE
Status: COMPLETED | OUTPATIENT
Start: 2024-06-17 | End: 2024-06-17

## 2024-06-17 RX ORDER — POTASSIUM CHLORIDE 7.45 MG/ML
10 INJECTION INTRAVENOUS
Status: DISPENSED | OUTPATIENT
Start: 2024-06-17 | End: 2024-06-17

## 2024-06-17 RX ORDER — SODIUM CHLORIDE, SODIUM LACTATE, POTASSIUM CHLORIDE, CALCIUM CHLORIDE 600; 310; 30; 20 MG/100ML; MG/100ML; MG/100ML; MG/100ML
INJECTION, SOLUTION INTRAVENOUS CONTINUOUS
Status: DISCONTINUED | OUTPATIENT
Start: 2024-06-17 | End: 2024-06-18

## 2024-06-17 RX ADMIN — WATER 30 MG: 1 INJECTION INTRAMUSCULAR; INTRAVENOUS; SUBCUTANEOUS at 11:32

## 2024-06-17 RX ADMIN — METRONIDAZOLE 500 MG: 500 INJECTION, SOLUTION INTRAVENOUS at 09:28

## 2024-06-17 RX ADMIN — IPRATROPIUM BROMIDE AND ALBUTEROL SULFATE 1 DOSE: .5; 3 SOLUTION RESPIRATORY (INHALATION) at 07:46

## 2024-06-17 RX ADMIN — IPRATROPIUM BROMIDE 2 SPRAY: 21 SPRAY NASAL at 22:29

## 2024-06-17 RX ADMIN — POTASSIUM CHLORIDE 10 MEQ: 7.46 INJECTION, SOLUTION INTRAVENOUS at 18:58

## 2024-06-17 RX ADMIN — ALBUMIN (HUMAN) 25 G: 0.25 INJECTION, SOLUTION INTRAVENOUS at 22:21

## 2024-06-17 RX ADMIN — IPRATROPIUM BROMIDE 2 SPRAY: 21 SPRAY NASAL at 09:23

## 2024-06-17 RX ADMIN — POTASSIUM CHLORIDE 10 MEQ: 7.45 INJECTION INTRAVENOUS at 13:15

## 2024-06-17 RX ADMIN — POTASSIUM CHLORIDE 10 MEQ: 7.45 INJECTION INTRAVENOUS at 17:14

## 2024-06-17 RX ADMIN — SODIUM CHLORIDE, POTASSIUM CHLORIDE, SODIUM LACTATE AND CALCIUM CHLORIDE: 600; 310; 30; 20 INJECTION, SOLUTION INTRAVENOUS at 15:28

## 2024-06-17 RX ADMIN — IPRATROPIUM BROMIDE AND ALBUTEROL SULFATE 1 DOSE: .5; 3 SOLUTION RESPIRATORY (INHALATION) at 20:45

## 2024-06-17 RX ADMIN — SODIUM CHLORIDE, PRESERVATIVE FREE 10 ML: 5 INJECTION INTRAVENOUS at 22:23

## 2024-06-17 RX ADMIN — AZELASTINE HYDROCHLORIDE 2 SPRAY: 137 SPRAY, METERED NASAL at 09:21

## 2024-06-17 RX ADMIN — METRONIDAZOLE 500 MG: 500 INJECTION, SOLUTION INTRAVENOUS at 01:00

## 2024-06-17 RX ADMIN — POTASSIUM CHLORIDE 10 MEQ: 7.45 INJECTION INTRAVENOUS at 11:41

## 2024-06-17 RX ADMIN — SODIUM CHLORIDE, PRESERVATIVE FREE 40 MG: 5 INJECTION INTRAVENOUS at 22:24

## 2024-06-17 RX ADMIN — SODIUM CHLORIDE, PRESERVATIVE FREE 40 MG: 5 INJECTION INTRAVENOUS at 09:20

## 2024-06-17 RX ADMIN — ALBUMIN (HUMAN) 25 G: 0.25 INJECTION, SOLUTION INTRAVENOUS at 11:50

## 2024-06-17 RX ADMIN — POTASSIUM CHLORIDE 10 MEQ: 7.45 INJECTION INTRAVENOUS at 15:33

## 2024-06-17 RX ADMIN — IPRATROPIUM BROMIDE AND ALBUTEROL SULFATE 1 DOSE: .5; 3 SOLUTION RESPIRATORY (INHALATION) at 12:20

## 2024-06-17 RX ADMIN — IPRATROPIUM BROMIDE AND ALBUTEROL SULFATE 1 DOSE: .5; 3 SOLUTION RESPIRATORY (INHALATION) at 15:46

## 2024-06-17 RX ADMIN — WATER 40 MG: 1 INJECTION INTRAMUSCULAR; INTRAVENOUS; SUBCUTANEOUS at 00:11

## 2024-06-17 RX ADMIN — AZELASTINE HYDROCHLORIDE 2 SPRAY: 137 SPRAY, METERED NASAL at 22:26

## 2024-06-17 ASSESSMENT — PAIN SCALES - WONG BAKER
WONGBAKER_NUMERICALRESPONSE: NO HURT

## 2024-06-17 NOTE — CONSULTS
Palliative Medicine  Patient Name: Ayden Simpson  YOB: 1965  MRN: [id number]  Age: 58 y.o.  Gender: male    Date of Initial Consult: June 17, 2024  Date of Service: 6/17/2024  Time: 1:48 PM  Provider: Paradise Sanchez NP  Hospital Day: 6  Admit Date: 6/12/2024  Referring Provider: Dr. Walker       Reasons for Consultation:  Goals of Care    HISTORY OF PRESENT ILLNESS (HPI):   Ayden Simpson is a 58 y.o. male  who was admitted on 6/12/2024 from home with a diagnosis of acute hypoxemic respiratory failure, pneumonia, sepsis.     PMH: cerebral palsy, chronic encephalopathy, bladder mass, gerd, anxiety, hip dysplasia, contracted upper ext, peg tube.    Psychosocial: pt has been living in current medical group home since Feb 2024.     PALLIATIVE DIAGNOSES:    Shortness of breath  cough  Hypoxia  Feeding difficulties  Palliative Care Encounter    Pertinent Hospital Diagnoses:  Principal Problem:    Sepsis (HCC)  Resolved Problems:    * No resolved hospital problems. *      ASSESSMENT AND PLAN:   Pt seen with caregiver Mitzi (hospice nurse and owner of the medical group [facility]) and  hospital sitter present. Services introduced. Pt unable to participate in discussions due to medical condition. Pt has a court appointed guardian.     Understanding of Illness/Discussion: Mitzi able to verbalize a clear understanding of the acute issues, concerns, and complexities of optimizing care for the patient.  She informed me that the guardian would like all efforts to prolong life at this time.   We discussed concerns of full resuscitation and artificial nutrition in the setting of aspiration. Guardian on board with exploring options for jtube. She also shared that the pt has rallied these medical setbacks in the past  Code Status: will plan to discuss code status with the guardian once information obtained  ACP:  Mitzi will provide court documents and information of the Guardian via email   Plan: continue current care  Will  participate in the care of Ayden Simpson.    Only check if applicable and billing time based rather than MDM  [x] The total encounter time on this service date was _70___ minutes which was spent performing a face-to-face encounter and personally completing the provider-level activities documented in the note. This includes time spent prior to the visit and after the visit in direct care of the patient. This time does not include time spent in any separately reportable services.    Electronically signed by   PEDRO Cortes, FNP-BC, [facility]  Palliative Care Team  on 6/17/2024 at 1:48 PM

## 2024-06-17 NOTE — PROGRESS NOTES
Physician Progress Note      PATIENT:               MONIQUE CODY  [facility] #:                  [id number]  :                       1965  ADMIT DATE:       2024 3:42 PM  DISCH DATE:  RESPONDING  PROVIDER #:        Shayla Perry MD          QUERY TEXT:    Good Morning    This patient admitted for Acute hypoxic respiratory failure due to Pneumonia.    The patient has Reflux and has been vomiting, also likely with aspiration.    If possible, please document in the progress notes and discharge summary if   you are evaluating and/or treating any of the following:    Note: CAP and HCAP indicate where the pneumonia was acquired, not a specific   type.    The medical record reflects the following:  Risk Factors: CP, PEG feed , GERD  Clinical Indicators: Pt admitted with Acute hypoxic resp. failure, left lower   lobe Pneumonia, Pt is PEG fed, Has CP, noted with Likely reflux and   aspiration, vomiting.  Treatment: IV CTX and Azithromycin, IV Flagyl for aspiration, CT chest,   Pulmonary consulted.    Thank you  Geovanna Pradhan, EDWINN,RN, CPHQ, CCDS, SMART  Options provided:  -- Gram negative  pneumonia  -- Aspiration pneumonia  -- Bacterial Pneumonia superimposed on Aspiraiton Pneumonia  -- Other - I will add my own diagnosis  -- Disagree - Not applicable / Not valid  -- Disagree - Clinically unable to determine / Unknown  -- Refer to Clinical Documentation Reviewer    PROVIDER RESPONSE TEXT:    This patient has aspiration pneumonia.    Query created by: Geovanna Pradhan on 2024 9:32 AM      Electronically signed by:  Shayla Perry MD 2024 2:18 PM

## 2024-06-17 NOTE — CONSULTS
Melody Mcginnis PA-C                       [phone] office             Monday-Friday 8:00 am-4:30 pm  I am not permitted to use \"perfect serve\" use above for contact, thanks.      Gastroenterology Consultation Note      Admit Date: 6/12/2024  Consult Date: 6/17/2024   I greatly appreciate your asking me to see Ayden Simpson, thank you very much for the opportunity to participate in his care.    Narrative Assessment and Plan   57 yo M admitted with pneumonia after recent vomiting of tube feeds. His caregiver at his group home, Mitzi Trujilloram [phone], informs me this is his second bout of aspiration pneumonia this year. Currently on ceftriaxone, azithromycin, and metronidazole, making good progress now maintaining sats >90% on 3L nasal cannula. PEG tube last changed by Dr. Castellanos 1/29/2020 to 20 mm PEG button device.     Impression:  Cerebral palsy  Current PEG tube   Aspiration pneumonia     Plan:  Plan for exchange of PEG to [facility] Wednesday 6/19/24 with Dr. Davis at 1200. Discussed this plan with Mitzi Blevins at the above number and she is in agreement. She is able to sign his consents and plans to be available between 1100 and 1200 Wednesday.   Can continue using current PEG tube for nutrition tonight and tomorrow. NPO past midnight tomorrow night.   Melody Mcginnis PA-C    Although clinical research is clear that PEG placement does not eliminate aspiration risk, the historical report of regurgitation of tube feeds might suggest benefit to feeding/delivery of tube feeds distal to the pylorus or ideally ligament of Treitz.  To that end we have agreed to convert current feeding system to a 24 Fr PEG with 12 Fr J tube passed through.  The G port can be used for medication, but not the J port.  The 12 Fr J tube will likely require replacement over time as it can clog (especially if used for medication) but we'll see if this prevents

## 2024-06-17 NOTE — PROGRESS NOTES
Comprehensive Nutrition Assessment    Type and Reason for Visit: Initial, Consult    Nutrition Recommendations/Plan:   Placement of post-pyloric feeding should be placed OR resume PEG feedings with usual precautions in place.   - Keep HOB > 30 degrees during feedings and for 1 hour after    - monitor for abd distention and tolerance of feedings/flushes     Once placed and confirmed for use resume plan as below for 18 hour cycled TF of home formula:     [employer] 1.4 Standard- begin at 40 mL/hr, increase rate by 5-10 mL every 6 hours during feeds to goal of 75 mL/hr x 18 hrs with 70 mL Flush Q3H.        Malnutrition Assessment:  Malnutrition Status:  At risk for malnutrition (Comment) (06/13/24 1203)    Context:    Tube Feed on hold x3 days, CP, NPO       Nutrition Assessment:    Past medical hx:       Diagnosis Date    Anxiety     Cerebral palsy (HCC)     Contracted, joint, multiple sites     upper extremities due to cerebral palsy    Developmental non-verbal disorder     Encephalopathy chronic     GERD (gastroesophageal reflux disease)     Hip dysplasia     Ill-defined condition     cerebral palsy    Intellectual disability     Mass of bladder     Microcephaly (HCC)     Muscle spasm     upper and lower extremities     6/13/24: Consult for TF received. Discussed home tube feed regimen with caretaker. Since Feb 2024 pt has been receiving [employer] 1.4 standard formula. Pt receives 3.5 cartons per day @ 75 mL/hr x18 hrs which provides 1547 kcal, 178 g Carbs, 70 g Pro, 808 mL free water, plus 70 mL flushes every 2 hours for additional ~700 mL free water for total of 1508 mL.     Caregiver reports mild weight gain since switching from [employer] 1.5 to [employer] in Feb due to better tolerance of GI formula.   Order changed to provide [employer] 1.4. RD provided 4 cartons for today's feeds.    6/17: Follow up. TF held since 6/15 about Noon for aspiration after episode of vomiting. Awaiting decision to change PEG to PEG-J

## 2024-06-17 NOTE — PROGRESS NOTES
RITO VALENZUELA [facility]  [address] [place] [address] [phone]        Hospitalist Progress Note      NAME: Ayden Simpson   :  1965  MRM:  [id number]    Date/Time of service: 2024  2:17 PM       Subjective:     Chief Complaint:  Patient was personally seen and examined by me during this time period.  Chart reviewed.  F/up AHRF 2/2 PNA    No subjective this am. O2 demand improving rapidly       Objective:       Vitals:       Last 24hrs VS reviewed since prior progress note. Most recent are:    Vitals:    24 1220   BP:    Pulse: 64   Resp: 18   Temp:    SpO2: 99%     SpO2 Readings from Last 6 Encounters:   24 99%          Intake/Output Summary (Last 24 hours) at 2024 1417  Last data filed at 2024 2353  Gross per 24 hour   Intake 70 ml   Output 300 ml   Net -230 ml          Exam:     Physical Exam:     Gen: frail male. No increased WOB  HEENT:  Pink conjunctivae, EOMI, hearing intact to voice, moist mucous membranes  Neck:  Supple, without masses, thyroid non-tender  Resp:  Ant fields CTAB with transmitted upper breath sounds. No increased WOB  Card:  No murmurs, normal S1, S2 without thrills, bruits or peripheral edema  Abd: PEG tube in place   Musc:  No cyanosis or clubbing  Skin:  No rashes or ulcers, skin turgor is good  Neuro: Contracted extremities. Grunts to verbalize otherwise non-verbal   Psych: Poor insight. alert      Medications Reviewed: (see below)    Lab Data Reviewed: (see below)    ______________________________________________________________________    Medications:     Current Facility-Administered Medications   Medication Dose Route Frequency    methylPREDNISolone sodium succ (SOLU-MEDROL) 30 mg in sterile water 0.75 mL injection  30 mg IntraVENous Q12H    potassium chloride 10 mEq/100 mL IVPB (Peripheral Line)  10 mEq IntraVENous Q1H    albumin human 25% IV solution 25 g  25 g IntraVENous Q12H    ondansetron (ZOFRAN) injection 4  Planning    Prophylaxis:  Lovenox    Disposition:   TBD           ___________________________________________________    Attending Physician: Shayla Walker MD

## 2024-06-17 NOTE — CARE COORDINATION
CM Note:  1. Pulmonology following-pt on 3L O2-wean as tolerated  2. Gen surg following-possible conversion to J-tube for feedings  3. Palliative care consult  4. Group home to transport at d/c    BOBBY John  6/17/2024  12:14 PM

## 2024-06-17 NOTE — PROGRESS NOTES
Name: Ayden Simpson Hospital: [facility]   : 1965 Admit Date: 2024   Phone: [phone]  Room: [facility]/   PCP: Lizzie Durham, APRN - CNP  MRN: [id number]   Date: 2024  Code: Full Code          Chart and notes reviewed. Data reviewed. I review the patient's current medications in the medical record at each encounter.  I have evaluated and examined the patient.       Mr. Simpson is a 58 year old male with history of cerebral palsy (nonverbal, chronic encephalopathy/microcephaly), bladder mass, and GERD, admitted to [facility] on  with respiratory failure and pneumonia.  He was initially treated with CTX/azithro with Flagyl subsequently added.  Stridor also noted following admission.  Overnight on , he had an episode of vomiting after completion of tube feeling.  Tube feeds have been held since that time.  Surgery has been consulted to opine of transition to J tube due to concerns for reflux.  He is now on HF O2 at 30L and 30% FiO2.  Primary team discussed GOC with POA who feels patient has a good quality of life at baseline wishes for patient to remain full cide, including CPR/intubation if needed.     Not able to obtain hx/ROS from patient due to underlying medical condition.     chest CT personally reviewed.  There is patchy groundglass infiltrate in the left upper and left lower lobes.     CXR personally reviewed.  Lung volumes are low, with persistent left basilar infiltrate and diffuse increasing interstitial infiltrate.      KUB: Nonobstructive bowel gas pattern.    proBNP 676  WBC 18.3 - trending down since admission (29.5)  Blood cx no growth x 2 days  MRSA screen neg   ABG 7.44/33/101/22    Interval history  Afebrile  BP soft/stable  Sats 99% on 3L  700ml UOP documented  K 3.1  WBC 9.4 - improved   blood cx no growth x 4 days    ROS: Unable to obtain due to underlying medical condition.  Smiles and grunts in response to name.    Past Medical  normal, no murmur, click, rub or gallop.   Abdomen:   Soft, non-tender. PEG tube in place.  Bowel sounds normal. No masses,  No organomegaly.   Extremities: Extremities contracted, trace pedal edema, no cyanosis.   Pulses: 2+ and symmetric all extremities.   Skin: Skin color, texture, turgor normal. No rashes or lesions   Lymph nodes: Cervical, supraclavicular nodes normal.   Neurologic: Unable to evaluate, smiles and make eye contact, makes grunting sound, poor insight, chronic encephalopathy.       Lab Results   Component Value Date/Time     06/17/2024 04:35 AM    K 3.1 06/17/2024 04:35 AM     06/17/2024 04:35 AM    CO2 30 06/17/2024 04:35 AM    BUN 18 06/17/2024 04:35 AM    MG 2.4 06/17/2024 04:35 AM    PHOS 2.8 06/14/2024 05:30 AM       Lab Results   Component Value Date/Time    WBC 9.4 06/17/2024 04:35 AM    HGB 13.3 06/17/2024 04:35 AM     06/17/2024 04:35 AM    MCV 92.6 06/17/2024 04:35 AM       Lab Results   Component Value Date/Time    GLOB 2.2 06/17/2024 04:35 AM       Lab Results   Component Value Date/Time    IRON 83 07/21/2023 09:39 AM    TIBC 409 07/21/2023 09:39 AM         IMPRESSION  Acute hypoxic respiratory failure  Pneumonia - CAP vs aspiration  GERD with vomiting episode of tube feeds  Cerebral palsy  Debility    PLAN  Wean O2 as able to keep sats > 90%; currently on 3L  Continue with IV abx for CAP and Flagyl to cover for potential aspiration.  Fu cultures.  Guaifenesin scheduled and chest PT as tolerated  Scheduled Duonebs  Weaning IVCS  General surgery following to weigh in on possible transition to J tube due to concerns for reflux.   IV Protonix  Keep HOB elevated  Repeat chest imaging in 6-8 weeks.  Per POA, patient to remain FULL CODE including CPR/intubation.  Palliative care has also been consulted per primary team.      MARY Frazier

## 2024-06-17 NOTE — PROGRESS NOTES
General Surgery Daily Progress Note    Patient: Ayden Simpson MRN: [id number]  SSN: [id number]    YOB: 1965  Age: 58 y.o.  Sex: male      Admit Date: 6/12/2024    POD * No surgery found *    Procedure: * No surgery found *    Subjective:   Patient was seen and examined at bedside.  Chart was reviewed.  Consulted for possible G->J-tube.     Current Facility-Administered Medications   Medication Dose Route Frequency    methylPREDNISolone sodium succ (SOLU-MEDROL) 30 mg in sterile water 0.75 mL injection  30 mg IntraVENous Q12H    potassium chloride 10 mEq/100 mL IVPB (Peripheral Line)  10 mEq IntraVENous Q1H    albumin human 25% IV solution 25 g  25 g IntraVENous Q12H    ondansetron (ZOFRAN) injection 4 mg  4 mg IntraVENous Q6H PRN    LORazepam (ATIVAN) tablet 2 mg  2 mg Per G Tube BID    LORazepam (ATIVAN) injection 0.5 mg  0.5 mg IntraVENous Q6H PRN    glycopyrrolate (ROBINUL) injection 0.1 mg  0.1 mg IntraVENous Q4H PRN    lactobacillus (CULTURELLE) capsule 1 capsule  1 capsule PEG Tube Daily with breakfast    metroNIDAZOLE (FLAGYL) 500 mg in 0.9% NaCl 100 mL IVPB premix  500 mg IntraVENous Q8H    sodium chloride nebulizer 0.9 % solution 3 mL  3 mL Nebulization Q4H PRN    ipratropium 0.5 mg-albuterol 2.5 mg (DUONEB) nebulizer solution 1 Dose  1 Dose Inhalation 4x Daily RT    racepinephrine HCl (VAPONEFPRIN) 2.25 % nebulizer solution NEBU 0.5 mL  0.5 mL Nebulization Once    ferrous Sulfate 300 (60 Fe) MG/5ML solution 300 mg  300 mg Per G Tube Daily    guaiFENesin (ROBITUSSIN) 100 MG/5ML liquid 400 mg  400 mg Per G Tube BID    cetirizine (ZYRTEC) tablet 5 mg  5 mg Per G Tube Daily    mirtazapine (REMERON) tablet 15 mg  15 mg Per G Tube Nightly    pantoprazole (PROTONIX) 40 mg in sodium chloride (PF) 0.9 % 10 mL injection  40 mg IntraVENous Q12H    bisacodyl (DULCOLAX) suppository 10 mg  10 mg Rectal Daily PRN    acetaminophen (TYLENOL) 160 MG/5ML solution 650 mg  650 mg Per G Tube BID    racepinephrine  HCl (VAPONEFPRIN) 2.25 % nebulizer solution NEBU 0.5 mL  0.5 mL Nebulization Q4H PRN    azelastine (ASTELIN) 0.1 % nasal spray 2 spray  2 spray Each Nostril BID    baclofen (LIORESAL) tablet 10 mg  10 mg PEG Tube TID    ipratropium (ATROVENT) 0.03 % nasal spray 2 spray  2 spray Nasal BID    risperiDONE (RisperDAL) 1 MG/ML oral solution 1 mg  1 mg PEG Tube TID    risperiDONE (RisperDAL) 1 MG/ML oral solution 1 mg  1 mg Oral Q8H PRN    sodium chloride flush 0.9 % injection 5-40 mL  5-40 mL IntraVENous 2 times per day    sodium chloride flush 0.9 % injection 5-40 mL  5-40 mL IntraVENous PRN    0.9 % sodium chloride infusion   IntraVENous PRN    enoxaparin Sodium (LOVENOX) injection 30 mg  30 mg SubCUTAneous Daily    acetaminophen (TYLENOL) tablet 650 mg  650 mg Oral Q6H PRN    Or    acetaminophen (TYLENOL) suppository 650 mg  650 mg Rectal Q6H PRN    glycopyrrolate (ROBINUL) tablet 1 mg  1 mg Per G Tube TID        Objective:   No intake/output data recorded.  06/15 1901 - 06/17 0700  In: 280   Out: 1250 [Urine:1250]  Patient Vitals for the past 8 hrs:   BP Temp Temp src Pulse Resp SpO2   06/17/24 1220 -- -- -- 64 18 99 %   06/17/24 1122 (!) 154/76 98.2 °F (36.8 °C) Axillary 64 16 98 %   06/17/24 0746 -- -- -- 50 16 99 %   06/17/24 0715 (!) 117/52 98.4 °F (36.9 °C) Axillary (!) 48 16 98 %   06/17/24 0700 -- -- -- (!) 49 -- --       Physical Exam:  General: Awake  Lungs: Unlabored  Abdomen: Soft, non-distended.   Extremities: Warm, no edema, all extremities w/contracture  Skin:  Warm and dry, no rash    Labs:   Recent Labs     06/17/24  0435   WBC 9.4   HGB 13.3   HCT 38.6        Recent Labs     06/17/24  0435      K 3.1*      CO2 30   BUN 18   MG 2.4   ALT 74       Assessment / Plan:     POD * No surgery found *    Procedure: * No surgery found *    58-year-old male admitted with respiratory failure and pneumonia.  Concern for aspiration risk.  Patient's record was reviewed.  G-tube was initially

## 2024-06-18 ENCOUNTER — APPOINTMENT (OUTPATIENT)
Facility: HOSPITAL | Age: 59
End: 2024-06-18
Payer: MEDICAID

## 2024-06-18 LAB
ALBUMIN SERPL-MCNC: 4.3 G/DL (ref 3.5–5)
ALBUMIN/GLOB SERPL: 2.2 (ref 1.1–2.2)
ALP SERPL-CCNC: 50 U/L (ref 45–117)
ALT SERPL-CCNC: 115 U/L (ref 12–78)
ANION GAP SERPL CALC-SCNC: 5 MMOL/L (ref 5–15)
AST SERPL-CCNC: 110 U/L (ref 15–37)
BASOPHILS # BLD: 0 K/UL (ref 0–0.1)
BASOPHILS NFR BLD: 0 % (ref 0–1)
BILIRUB SERPL-MCNC: 0.7 MG/DL (ref 0.2–1)
BUN SERPL-MCNC: 21 MG/DL (ref 6–20)
BUN/CREAT SERPL: 32 (ref 12–20)
CALCIUM SERPL-MCNC: 8.9 MG/DL (ref 8.5–10.1)
CHLORIDE SERPL-SCNC: 108 MMOL/L (ref 97–108)
CO2 SERPL-SCNC: 28 MMOL/L (ref 21–32)
CREAT SERPL-MCNC: 0.65 MG/DL (ref 0.7–1.3)
DIFFERENTIAL METHOD BLD: ABNORMAL
EOSINOPHIL # BLD: 0 K/UL (ref 0–0.4)
EOSINOPHIL NFR BLD: 0 % (ref 0–7)
ERYTHROCYTE [DISTWIDTH] IN BLOOD BY AUTOMATED COUNT: 12.3 % (ref 11.5–14.5)
GLOBULIN SER CALC-MCNC: 2 G/DL (ref 2–4)
GLUCOSE BLD STRIP.AUTO-MCNC: 92 MG/DL (ref 65–117)
GLUCOSE SERPL-MCNC: 82 MG/DL (ref 65–100)
HCT VFR BLD AUTO: 38.2 % (ref 36.6–50.3)
HGB BLD-MCNC: 12.8 G/DL (ref 12.1–17)
IMM GRANULOCYTES # BLD AUTO: 0 K/UL
IMM GRANULOCYTES NFR BLD AUTO: 0 %
LYMPHOCYTES # BLD: 1.2 K/UL (ref 0.8–3.5)
LYMPHOCYTES NFR BLD: 13 % (ref 12–49)
MAGNESIUM SERPL-MCNC: 2.5 MG/DL (ref 1.6–2.4)
MCH RBC QN AUTO: 32.3 PG (ref 26–34)
MCHC RBC AUTO-ENTMCNC: 33.5 G/DL (ref 30–36.5)
MCV RBC AUTO: 96.5 FL (ref 80–99)
METAMYELOCYTES NFR BLD MANUAL: 2 %
MONOCYTES # BLD: 1 K/UL (ref 0–1)
MONOCYTES NFR BLD: 10 % (ref 5–13)
NEUTS SEG # BLD: 7.1 K/UL (ref 1.8–8)
NEUTS SEG NFR BLD: 75 % (ref 32–75)
NRBC # BLD: 0 K/UL (ref 0–0.01)
NRBC BLD-RTO: 0 PER 100 WBC
PLATELET # BLD AUTO: 205 K/UL (ref 150–400)
PMV BLD AUTO: 9.9 FL (ref 8.9–12.9)
POTASSIUM SERPL-SCNC: 2.8 MMOL/L (ref 3.5–5.1)
PROT SERPL-MCNC: 6.3 G/DL (ref 6.4–8.2)
RBC # BLD AUTO: 3.96 M/UL (ref 4.1–5.7)
RBC MORPH BLD: ABNORMAL
SERVICE CMNT-IMP: NORMAL
SODIUM SERPL-SCNC: 141 MMOL/L (ref 136–145)
WBC # BLD AUTO: 9.5 K/UL (ref 4.1–11.1)

## 2024-06-18 PROCEDURE — 82962 GLUCOSE BLOOD TEST: CPT

## 2024-06-18 PROCEDURE — P9047 ALBUMIN (HUMAN), 25%, 50ML: HCPCS | Performed by: STUDENT IN AN ORGANIZED HEALTH CARE EDUCATION/TRAINING PROGRAM

## 2024-06-18 PROCEDURE — 6360000002 HC RX W HCPCS

## 2024-06-18 PROCEDURE — 36415 COLL VENOUS BLD VENIPUNCTURE: CPT

## 2024-06-18 PROCEDURE — 94640 AIRWAY INHALATION TREATMENT: CPT

## 2024-06-18 PROCEDURE — 2580000003 HC RX 258: Performed by: INTERNAL MEDICINE

## 2024-06-18 PROCEDURE — 6360000002 HC RX W HCPCS: Performed by: HOSPITALIST

## 2024-06-18 PROCEDURE — 2060000000 HC ICU INTERMEDIATE R&B

## 2024-06-18 PROCEDURE — 6360000002 HC RX W HCPCS: Performed by: INTERNAL MEDICINE

## 2024-06-18 PROCEDURE — 83735 ASSAY OF MAGNESIUM: CPT

## 2024-06-18 PROCEDURE — 6370000000 HC RX 637 (ALT 250 FOR IP): Performed by: INTERNAL MEDICINE

## 2024-06-18 PROCEDURE — 76705 ECHO EXAM OF ABDOMEN: CPT

## 2024-06-18 PROCEDURE — C9113 INJ PANTOPRAZOLE SODIUM, VIA: HCPCS | Performed by: INTERNAL MEDICINE

## 2024-06-18 PROCEDURE — 80053 COMPREHEN METABOLIC PANEL: CPT

## 2024-06-18 PROCEDURE — 6360000002 HC RX W HCPCS: Performed by: STUDENT IN AN ORGANIZED HEALTH CARE EDUCATION/TRAINING PROGRAM

## 2024-06-18 PROCEDURE — 2580000003 HC RX 258: Performed by: HOSPITALIST

## 2024-06-18 PROCEDURE — 85025 COMPLETE CBC W/AUTO DIFF WBC: CPT

## 2024-06-18 PROCEDURE — 2580000003 HC RX 258: Performed by: PHYSICIAN ASSISTANT

## 2024-06-18 PROCEDURE — 94761 N-INVAS EAR/PLS OXIMETRY MLT: CPT

## 2024-06-18 PROCEDURE — 2700000000 HC OXYGEN THERAPY PER DAY

## 2024-06-18 PROCEDURE — 6360000002 HC RX W HCPCS: Performed by: PHYSICIAN ASSISTANT

## 2024-06-18 RX ORDER — FUROSEMIDE 10 MG/ML
20 INJECTION INTRAMUSCULAR; INTRAVENOUS ONCE
Status: COMPLETED | OUTPATIENT
Start: 2024-06-18 | End: 2024-06-18

## 2024-06-18 RX ORDER — POTASSIUM CHLORIDE 7.45 MG/ML
10 INJECTION INTRAVENOUS
Status: DISPENSED | OUTPATIENT
Start: 2024-06-18 | End: 2024-06-18

## 2024-06-18 RX ORDER — POTASSIUM CHLORIDE 7.45 MG/ML
10 INJECTION INTRAVENOUS
Status: DISCONTINUED | OUTPATIENT
Start: 2024-06-18 | End: 2024-06-18

## 2024-06-18 RX ORDER — POTASSIUM CHLORIDE 7.45 MG/ML
10 INJECTION INTRAVENOUS
Status: COMPLETED | OUTPATIENT
Start: 2024-06-18 | End: 2024-06-18

## 2024-06-18 RX ADMIN — WATER 30 MG: 1 INJECTION INTRAMUSCULAR; INTRAVENOUS; SUBCUTANEOUS at 01:52

## 2024-06-18 RX ADMIN — FUROSEMIDE 20 MG: 10 INJECTION, SOLUTION INTRAMUSCULAR; INTRAVENOUS at 01:51

## 2024-06-18 RX ADMIN — SODIUM CHLORIDE: 9 INJECTION, SOLUTION INTRAVENOUS at 22:00

## 2024-06-18 RX ADMIN — IPRATROPIUM BROMIDE AND ALBUTEROL SULFATE 1 DOSE: .5; 3 SOLUTION RESPIRATORY (INHALATION) at 20:03

## 2024-06-18 RX ADMIN — ALBUMIN (HUMAN) 25 G: 0.25 INJECTION, SOLUTION INTRAVENOUS at 12:34

## 2024-06-18 RX ADMIN — ALBUMIN (HUMAN) 25 G: 0.25 INJECTION, SOLUTION INTRAVENOUS at 22:01

## 2024-06-18 RX ADMIN — IPRATROPIUM BROMIDE 2 SPRAY: 21 SPRAY NASAL at 21:53

## 2024-06-18 RX ADMIN — AZELASTINE HYDROCHLORIDE 2 SPRAY: 137 SPRAY, METERED NASAL at 21:54

## 2024-06-18 RX ADMIN — POTASSIUM CHLORIDE 10 MEQ: 7.45 INJECTION INTRAVENOUS at 16:52

## 2024-06-18 RX ADMIN — ENOXAPARIN SODIUM 30 MG: 100 INJECTION SUBCUTANEOUS at 08:02

## 2024-06-18 RX ADMIN — IPRATROPIUM BROMIDE AND ALBUTEROL SULFATE 1 DOSE: .5; 3 SOLUTION RESPIRATORY (INHALATION) at 07:54

## 2024-06-18 RX ADMIN — POTASSIUM CHLORIDE 10 MEQ: 7.46 INJECTION, SOLUTION INTRAVENOUS at 05:29

## 2024-06-18 RX ADMIN — POTASSIUM CHLORIDE 10 MEQ: 7.45 INJECTION INTRAVENOUS at 15:32

## 2024-06-18 RX ADMIN — IPRATROPIUM BROMIDE 2 SPRAY: 21 SPRAY NASAL at 13:36

## 2024-06-18 RX ADMIN — SODIUM CHLORIDE, PRESERVATIVE FREE 10 ML: 5 INJECTION INTRAVENOUS at 21:55

## 2024-06-18 RX ADMIN — IPRATROPIUM BROMIDE AND ALBUTEROL SULFATE 1 DOSE: .5; 3 SOLUTION RESPIRATORY (INHALATION) at 15:45

## 2024-06-18 RX ADMIN — SODIUM CHLORIDE: 9 INJECTION, SOLUTION INTRAVENOUS at 05:28

## 2024-06-18 RX ADMIN — IPRATROPIUM BROMIDE AND ALBUTEROL SULFATE 1 DOSE: .5; 3 SOLUTION RESPIRATORY (INHALATION) at 12:01

## 2024-06-18 RX ADMIN — SODIUM CHLORIDE, PRESERVATIVE FREE 40 MG: 5 INJECTION INTRAVENOUS at 08:03

## 2024-06-18 RX ADMIN — POTASSIUM CHLORIDE 10 MEQ: 7.45 INJECTION INTRAVENOUS at 12:30

## 2024-06-18 RX ADMIN — SODIUM CHLORIDE, PRESERVATIVE FREE 40 MG: 5 INJECTION INTRAVENOUS at 21:52

## 2024-06-18 RX ADMIN — POTASSIUM CHLORIDE 10 MEQ: 7.46 INJECTION, SOLUTION INTRAVENOUS at 08:01

## 2024-06-18 RX ADMIN — POTASSIUM CHLORIDE 10 MEQ: 7.46 INJECTION, SOLUTION INTRAVENOUS at 06:27

## 2024-06-18 RX ADMIN — SODIUM CHLORIDE, PRESERVATIVE FREE 10 ML: 5 INJECTION INTRAVENOUS at 08:03

## 2024-06-18 RX ADMIN — POTASSIUM CHLORIDE 10 MEQ: 7.45 INJECTION INTRAVENOUS at 14:24

## 2024-06-18 RX ADMIN — WATER 30 MG: 1 INJECTION INTRAMUSCULAR; INTRAVENOUS; SUBCUTANEOUS at 14:20

## 2024-06-18 RX ADMIN — POTASSIUM CHLORIDE 10 MEQ: 7.45 INJECTION INTRAVENOUS at 13:33

## 2024-06-18 ASSESSMENT — PAIN SCALES - WONG BAKER
WONGBAKER_NUMERICALRESPONSE: NO HURT

## 2024-06-18 NOTE — PROGRESS NOTES
Ok to cont PEG meds now. Hold tube feeds until after procedure tomorrow. Respiratory failure almost resolved. No need to risk another aspiration event to restart nutrition 1 day sooner. After procedure tomorrow will restart feeds and discharge if tolerating. Respiratory failure should be completely resolved by then, if no new aspiration event.    I will leave ECHO order in. Patient developed edema, including pulm edema d/t IVF. If ECHO doesn't get done before discharge, I think ok to get it done in outpt setting. Full note to follow    Shayla Walker MD

## 2024-06-18 NOTE — PROGRESS NOTES
pneumonia and is currently maintaining oxygen saturation above 90% on 3L nasal cannula.     06/18/24 : Patient resting in bed, appears comfortable. No apparent abdominal tenderness.     ROS:  The previous review of systems on initial consultation / H&P is noted and reviewed.  Specific changes noted above in HPI.    Current Medications:     Current Facility-Administered Medications   Medication Dose Route Frequency    methylPREDNISolone sodium succ (SOLU-MEDROL) 30 mg in sterile water 0.75 mL injection  30 mg IntraVENous Q12H    [Held by provider] lactated ringers IV soln infusion   IntraVENous Continuous    albumin human 25% IV solution 25 g  25 g IntraVENous Q12H    ondansetron (ZOFRAN) injection 4 mg  4 mg IntraVENous Q6H PRN    LORazepam (ATIVAN) tablet 2 mg  2 mg Per G Tube BID    LORazepam (ATIVAN) injection 0.5 mg  0.5 mg IntraVENous Q6H PRN    glycopyrrolate (ROBINUL) injection 0.1 mg  0.1 mg IntraVENous Q4H PRN    lactobacillus (CULTURELLE) capsule 1 capsule  1 capsule PEG Tube Daily with breakfast    sodium chloride nebulizer 0.9 % solution 3 mL  3 mL Nebulization Q4H PRN    ipratropium 0.5 mg-albuterol 2.5 mg (DUONEB) nebulizer solution 1 Dose  1 Dose Inhalation 4x Daily RT    racepinephrine HCl (VAPONEFPRIN) 2.25 % nebulizer solution NEBU 0.5 mL  0.5 mL Nebulization Once    ferrous Sulfate 300 (60 Fe) MG/5ML solution 300 mg  300 mg Per G Tube Daily    guaiFENesin (ROBITUSSIN) 100 MG/5ML liquid 400 mg  400 mg Per G Tube BID    cetirizine (ZYRTEC) tablet 5 mg  5 mg Per G Tube Daily    mirtazapine (REMERON) tablet 15 mg  15 mg Per G Tube Nightly    pantoprazole (PROTONIX) 40 mg in sodium chloride (PF) 0.9 % 10 mL injection  40 mg IntraVENous Q12H    bisacodyl (DULCOLAX) suppository 10 mg  10 mg Rectal Daily PRN    acetaminophen (TYLENOL) 160 MG/5ML solution 650 mg  650 mg Per G Tube BID    racepinephrine HCl (VAPONEFPRIN) 2.25 % nebulizer solution NEBU 0.5 mL  0.5 mL Nebulization Q4H PRN    azelastine    ALT 44 74 115*     No results found for: \"GLUCPOC\"  No results for input(s): \"PH\", \"PCO2\", \"PO2\", \"HCO3\", \"FIO2\" in the last 72 hours.  No results for input(s): \"INR\" in the last 72 hours.        Assessment:   (See above)  Principal Problem:    Sepsis (HCC)  Resolved Problems:    * No resolved hospital problems. *      Plan:   (See above)      Signed By: Melody Mcginnis PA-C     6/18/2024  9:13 AM

## 2024-06-18 NOTE — PROGRESS NOTES
(ROBITUSSIN) 100 MG/5ML liquid 400 mg  400 mg Per G Tube BID    cetirizine (ZYRTEC) tablet 5 mg  5 mg Per G Tube Daily    mirtazapine (REMERON) tablet 15 mg  15 mg Per G Tube Nightly    pantoprazole (PROTONIX) 40 mg in sodium chloride (PF) 0.9 % 10 mL injection  40 mg IntraVENous Q12H    bisacodyl (DULCOLAX) suppository 10 mg  10 mg Rectal Daily PRN    acetaminophen (TYLENOL) 160 MG/5ML solution 650 mg  650 mg Per G Tube BID    racepinephrine HCl (VAPONEFPRIN) 2.25 % nebulizer solution NEBU 0.5 mL  0.5 mL Nebulization Q4H PRN    azelastine (ASTELIN) 0.1 % nasal spray 2 spray  2 spray Each Nostril BID    baclofen (LIORESAL) tablet 10 mg  10 mg PEG Tube TID    ipratropium (ATROVENT) 0.03 % nasal spray 2 spray  2 spray Nasal BID    risperiDONE (RisperDAL) 1 MG/ML oral solution 1 mg  1 mg PEG Tube TID    risperiDONE (RisperDAL) 1 MG/ML oral solution 1 mg  1 mg Oral Q8H PRN    sodium chloride flush 0.9 % injection 5-40 mL  5-40 mL IntraVENous 2 times per day    sodium chloride flush 0.9 % injection 5-40 mL  5-40 mL IntraVENous PRN    0.9 % sodium chloride infusion   IntraVENous PRN    enoxaparin Sodium (LOVENOX) injection 30 mg  30 mg SubCUTAneous Daily    acetaminophen (TYLENOL) tablet 650 mg  650 mg Oral Q6H PRN    Or    acetaminophen (TYLENOL) suppository 650 mg  650 mg Rectal Q6H PRN    glycopyrrolate (ROBINUL) tablet 1 mg  1 mg Per G Tube TID         REVIEW OF SYSTEMS   12 point ROS negative except as stated in the HPI.      Physical Exam:   Vitals:    06/18/24 0754   BP:    Pulse: 63   Resp: 22   Temp:    SpO2: 99%       General:  Awake and alert, frail.  Smiles in response to voice.   Head:  Normocephalic, without obvious abnormality, atraumatic.   Eyes:  Conjunctivae/corneas clear.    Nose: Nares normal. Septum midline. Mucosa normal.    Throat: Lips, mucosa, and tongue normal.    Neck: Supple, symmetrical, trachea midline, no adenopathy.   Lungs:   Clear breath sounds and no upper airway rhonchi appreciated

## 2024-06-18 NOTE — CARE COORDINATION
CM Note:  GI following.  The plan is to convert PEG to PEJ on 6/19, Wednesday.  Pulmonary following; pt remains on 3L O2.    BOBBY John  6/18/2024  12:54 PM

## 2024-06-18 NOTE — FLOWSHEET NOTE
Concern for fluid overload. No ECHO on file. IVF held. Will get CXR. Monitor for worsening respiratory status. Additionally, RN w/ concern for risk for aspiration - 15 mL residual noted after flush. No episodes of vomiting. Will hold PM PEG meds. Continue aspiration precautions. Monitor for abd distention and vomiting. Need clarification from GI on patient nutrition. Plan for exchange of PRG to PEJ w/ GI on Wednesday.    BOWEN: Potassium 2.8 Will replete. CXR w/ moderate pulmonary edema. Continue to hold fluids. Dose of Lasix now. BNP added to AM labs. ECHO in AM. Monitor.

## 2024-06-18 NOTE — PROGRESS NOTES
[facility]  [address] [place] [address] [phone]        Hospitalist Progress Note      NAME: Ayden Simpson   :  1965  MRM:  [id number]    Date/Time of service: 2024  2:00 PM       Subjective:     Chief Complaint:  Patient was personally seen and examined by me during this time period.  Chart reviewed.  F/up AHRF 2/2 PNA    No subjective this am. O2 demand practically resolved       Objective:       Vitals:       Last 24hrs VS reviewed since prior progress note. Most recent are:    Vitals:    24 1202   BP:    Pulse: 70   Resp: 18   Temp:    SpO2: 99%     SpO2 Readings from Last 6 Encounters:   24 99%          Intake/Output Summary (Last 24 hours) at 2024 1400  Last data filed at 2024 0700  Gross per 24 hour   Intake 0 ml   Output 2200 ml   Net -2200 ml          Exam:     Physical Exam:     Gen: frail male. No increased WOB  HEENT:  Pink conjunctivae, EOMI, hearing intact to voice, moist mucous membranes  Neck:  Supple, without masses, thyroid non-tender  Resp:  Ant fields CTAB with transmitted upper breath sounds. No increased WOB  Card:  No murmurs, normal S1, S2 without thrills, bruits or peripheral edema  Abd: PEG tube in place   Musc:  No cyanosis or clubbing  Skin:  No rashes or ulcers, skin turgor is good  Neuro: Contracted extremities. Grunts to verbalize otherwise non-verbal   Psych: Poor insight. alert      Medications Reviewed: (see below)    Lab Data Reviewed: (see below)    ______________________________________________________________________    Medications:     Current Facility-Administered Medications   Medication Dose Route Frequency    potassium chloride 10 mEq/100 mL IVPB (Peripheral Line)  10 mEq IntraVENous Q1H    methylPREDNISolone sodium succ (SOLU-MEDROL) 30 mg in sterile water 0.75 mL injection  30 mg IntraVENous Q12H    albumin human 25% IV solution 25 g  25 g IntraVENous Q12H    ondansetron (ZOFRAN)

## 2024-06-19 ENCOUNTER — APPOINTMENT (OUTPATIENT)
Facility: HOSPITAL | Age: 59
End: 2024-06-19
Payer: MEDICAID

## 2024-06-19 PROBLEM — E43 SEVERE PROTEIN-ENERGY MALNUTRITION (HCC): Status: ACTIVE | Noted: 2024-06-19

## 2024-06-19 LAB
-: NORMAL
ALBUMIN SERPL-MCNC: 5.4 G/DL (ref 3.5–5)
ALBUMIN/GLOB SERPL: 2.5 (ref 1.1–2.2)
ALP SERPL-CCNC: 56 U/L (ref 45–117)
ALT SERPL-CCNC: 135 U/L (ref 12–78)
ANION GAP SERPL CALC-SCNC: 6 MMOL/L (ref 5–15)
AST SERPL-CCNC: 68 U/L (ref 15–37)
BASOPHILS # BLD: 0 K/UL (ref 0–0.1)
BASOPHILS NFR BLD: 0 % (ref 0–1)
BILIRUB SERPL-MCNC: 1.1 MG/DL (ref 0.2–1)
BUN SERPL-MCNC: 27 MG/DL (ref 6–20)
BUN/CREAT SERPL: 41 (ref 12–20)
CALCIUM SERPL-MCNC: 9.6 MG/DL (ref 8.5–10.1)
CHLORIDE SERPL-SCNC: 108 MMOL/L (ref 97–108)
CO2 SERPL-SCNC: 24 MMOL/L (ref 21–32)
CREAT SERPL-MCNC: 0.66 MG/DL (ref 0.7–1.3)
DIFFERENTIAL METHOD BLD: ABNORMAL
EOSINOPHIL # BLD: 0 K/UL (ref 0–0.4)
EOSINOPHIL NFR BLD: 0 % (ref 0–7)
ERYTHROCYTE [DISTWIDTH] IN BLOOD BY AUTOMATED COUNT: 12 % (ref 11.5–14.5)
GLOBULIN SER CALC-MCNC: 2.2 G/DL (ref 2–4)
GLUCOSE BLD STRIP.AUTO-MCNC: 161 MG/DL (ref 65–117)
GLUCOSE SERPL-MCNC: 99 MG/DL (ref 65–100)
HAV IGM SER QL: NONREACTIVE
HBV CORE IGM SER QL: NONREACTIVE
HBV SURFACE AG SER QL: <0.1 INDEX
HBV SURFACE AG SER QL: NEGATIVE
HCT VFR BLD AUTO: 41.1 % (ref 36.6–50.3)
HCV AB SER IA-ACNC: <0.02 INDEX
HCV AB SERPL QL IA: NONREACTIVE
HGB BLD-MCNC: 13.9 G/DL (ref 12.1–17)
IMM GRANULOCYTES # BLD AUTO: 0 K/UL
IMM GRANULOCYTES NFR BLD AUTO: 0 %
LYMPHOCYTES # BLD: 0.6 K/UL (ref 0.8–3.5)
LYMPHOCYTES NFR BLD: 6 % (ref 12–49)
MAGNESIUM SERPL-MCNC: 2.7 MG/DL (ref 1.6–2.4)
MCH RBC QN AUTO: 32 PG (ref 26–34)
MCHC RBC AUTO-ENTMCNC: 33.8 G/DL (ref 30–36.5)
MCV RBC AUTO: 94.5 FL (ref 80–99)
MONOCYTES # BLD: 0.7 K/UL (ref 0–1)
MONOCYTES NFR BLD: 7 % (ref 5–13)
NEUTS BAND NFR BLD MANUAL: 1 % (ref 0–6)
NEUTS SEG # BLD: 8.8 K/UL (ref 1.8–8)
NEUTS SEG NFR BLD: 86 % (ref 32–75)
NRBC # BLD: 0 K/UL (ref 0–0.01)
NRBC BLD-RTO: 0 PER 100 WBC
PLATELET # BLD AUTO: 216 K/UL (ref 150–400)
PMV BLD AUTO: 9.7 FL (ref 8.9–12.9)
POTASSIUM SERPL-SCNC: 4 MMOL/L (ref 3.5–5.1)
PROT SERPL-MCNC: 7.6 G/DL (ref 6.4–8.2)
RBC # BLD AUTO: 4.35 M/UL (ref 4.1–5.7)
RBC MORPH BLD: ABNORMAL
SERVICE CMNT-IMP: ABNORMAL
SODIUM SERPL-SCNC: 138 MMOL/L (ref 136–145)
WBC # BLD AUTO: 10.1 K/UL (ref 4.1–11.1)

## 2024-06-19 PROCEDURE — 2060000000 HC ICU INTERMEDIATE R&B

## 2024-06-19 PROCEDURE — 6370000000 HC RX 637 (ALT 250 FOR IP): Performed by: INTERNAL MEDICINE

## 2024-06-19 PROCEDURE — 94640 AIRWAY INHALATION TREATMENT: CPT

## 2024-06-19 PROCEDURE — 85025 COMPLETE CBC W/AUTO DIFF WBC: CPT

## 2024-06-19 PROCEDURE — 6370000000 HC RX 637 (ALT 250 FOR IP)

## 2024-06-19 PROCEDURE — 2580000003 HC RX 258: Performed by: HOSPITALIST

## 2024-06-19 PROCEDURE — 6370000000 HC RX 637 (ALT 250 FOR IP): Performed by: HOSPITALIST

## 2024-06-19 PROCEDURE — 83735 ASSAY OF MAGNESIUM: CPT

## 2024-06-19 PROCEDURE — 80053 COMPREHEN METABOLIC PANEL: CPT

## 2024-06-19 PROCEDURE — C9113 INJ PANTOPRAZOLE SODIUM, VIA: HCPCS | Performed by: INTERNAL MEDICINE

## 2024-06-19 PROCEDURE — 6360000002 HC RX W HCPCS: Performed by: PHYSICIAN ASSISTANT

## 2024-06-19 PROCEDURE — 82962 GLUCOSE BLOOD TEST: CPT

## 2024-06-19 PROCEDURE — 94668 MNPJ CHEST WALL SBSQ: CPT

## 2024-06-19 PROCEDURE — 2580000003 HC RX 258: Performed by: INTERNAL MEDICINE

## 2024-06-19 PROCEDURE — 6360000002 HC RX W HCPCS: Performed by: INTERNAL MEDICINE

## 2024-06-19 PROCEDURE — 2700000000 HC OXYGEN THERAPY PER DAY

## 2024-06-19 PROCEDURE — 6360000002 HC RX W HCPCS: Performed by: HOSPITALIST

## 2024-06-19 PROCEDURE — 2580000003 HC RX 258: Performed by: PHYSICIAN ASSISTANT

## 2024-06-19 PROCEDURE — 2500000003 HC RX 250 WO HCPCS: Performed by: INTERNAL MEDICINE

## 2024-06-19 PROCEDURE — 36415 COLL VENOUS BLD VENIPUNCTURE: CPT

## 2024-06-19 PROCEDURE — 94761 N-INVAS EAR/PLS OXIMETRY MLT: CPT

## 2024-06-19 RX ORDER — IPRATROPIUM BROMIDE AND ALBUTEROL SULFATE 2.5; .5 MG/3ML; MG/3ML
1 SOLUTION RESPIRATORY (INHALATION)
Status: CANCELLED | OUTPATIENT
Start: 2024-06-19

## 2024-06-19 RX ADMIN — SODIUM CHLORIDE, PRESERVATIVE FREE 10 ML: 5 INJECTION INTRAVENOUS at 08:29

## 2024-06-19 RX ADMIN — GLYCOPYRROLATE 1 MG: 1 TABLET ORAL at 13:27

## 2024-06-19 RX ADMIN — IPRATROPIUM BROMIDE AND ALBUTEROL SULFATE 1 DOSE: .5; 3 SOLUTION RESPIRATORY (INHALATION) at 11:28

## 2024-06-19 RX ADMIN — IPRATROPIUM BROMIDE AND ALBUTEROL SULFATE 1 DOSE: .5; 3 SOLUTION RESPIRATORY (INHALATION) at 19:15

## 2024-06-19 RX ADMIN — RISPERIDONE 1 MG: 1 SOLUTION ORAL at 13:54

## 2024-06-19 RX ADMIN — WATER 20 MG: 1 INJECTION INTRAMUSCULAR; INTRAVENOUS; SUBCUTANEOUS at 13:23

## 2024-06-19 RX ADMIN — IPRATROPIUM BROMIDE 2 SPRAY: 21 SPRAY NASAL at 21:20

## 2024-06-19 RX ADMIN — IPRATROPIUM BROMIDE AND ALBUTEROL SULFATE 1 DOSE: .5; 3 SOLUTION RESPIRATORY (INHALATION) at 07:40

## 2024-06-19 RX ADMIN — MIRTAZAPINE 15 MG: 15 TABLET, FILM COATED ORAL at 21:26

## 2024-06-19 RX ADMIN — AZELASTINE HYDROCHLORIDE 2 SPRAY: 137 SPRAY, METERED NASAL at 08:29

## 2024-06-19 RX ADMIN — LORAZEPAM 2 MG: 1 TABLET ORAL at 21:26

## 2024-06-19 RX ADMIN — RISPERIDONE 1 MG: 1 SOLUTION ORAL at 21:27

## 2024-06-19 RX ADMIN — BACLOFEN 10 MG: 10 TABLET ORAL at 21:22

## 2024-06-19 RX ADMIN — ASCORBIC ACID, VITAMIN A PALMITATE, CHOLECALCIFEROL, THIAMINE HYDROCHLORIDE, RIBOFLAVIN-5 PHOSPHATE SODIUM, PYRIDOXINE HYDROCHLORIDE, NIACINAMIDE, DEXPANTHENOL, ALPHA-TOCOPHEROL ACETATE, VITAMIN K1, FOLIC ACID, BIOTIN, CYANOCOBALAMIN: 200; 3300; 200; 6; 3.6; 6; 40; 15; 10; 150; 600; 60; 5 INJECTION, SOLUTION INTRAVENOUS at 17:25

## 2024-06-19 RX ADMIN — GLYCOPYRROLATE 1 MG: 1 TABLET ORAL at 21:23

## 2024-06-19 RX ADMIN — AZELASTINE HYDROCHLORIDE 2 SPRAY: 137 SPRAY, METERED NASAL at 21:20

## 2024-06-19 RX ADMIN — IPRATROPIUM BROMIDE AND ALBUTEROL SULFATE 1 DOSE: .5; 3 SOLUTION RESPIRATORY (INHALATION) at 16:50

## 2024-06-19 RX ADMIN — GUAIFENESIN 400 MG: 200 SOLUTION ORAL at 21:23

## 2024-06-19 RX ADMIN — IPRATROPIUM BROMIDE 2 SPRAY: 21 SPRAY NASAL at 08:30

## 2024-06-19 RX ADMIN — ENOXAPARIN SODIUM 30 MG: 100 INJECTION SUBCUTANEOUS at 08:29

## 2024-06-19 RX ADMIN — I.V. FAT EMULSION 250 ML: 20 EMULSION INTRAVENOUS at 17:31

## 2024-06-19 RX ADMIN — BACLOFEN 10 MG: 10 TABLET ORAL at 13:28

## 2024-06-19 RX ADMIN — SODIUM CHLORIDE, PRESERVATIVE FREE 10 ML: 5 INJECTION INTRAVENOUS at 21:29

## 2024-06-19 RX ADMIN — WATER 30 MG: 1 INJECTION INTRAMUSCULAR; INTRAVENOUS; SUBCUTANEOUS at 01:40

## 2024-06-19 RX ADMIN — SODIUM CHLORIDE, PRESERVATIVE FREE 40 MG: 5 INJECTION INTRAVENOUS at 08:29

## 2024-06-19 RX ADMIN — SODIUM CHLORIDE, PRESERVATIVE FREE 40 MG: 5 INJECTION INTRAVENOUS at 21:27

## 2024-06-19 ASSESSMENT — PAIN SCALES - WONG BAKER
WONGBAKER_NUMERICALRESPONSE: NO HURT
WONGBAKER_NUMERICALRESPONSE: NO HURT

## 2024-06-19 NOTE — CARE COORDINATION
Discussed in IDR, anticipating discharge home on Friday.  Lives at [facility] and message was left for Mitzi Blevins [phone].  Converting to J tube today, need to clarify HH preference with Mitzi.  Monitoring need for oxygen at discharge, which is not anticipated.  Mitzi to provide transportation back to the group home at discharge.     Transition of care  RUR 13%  1- Converting to J tube today  2- Dietician following   3- Monitoring need for oxygen  4- CM following to coordinate transition of care/  HH followup  5- Group Home to provide transportation at discharge.

## 2024-06-19 NOTE — PROGRESS NOTES
Chart review and learned about patient's current medical state. Rounded on IMCU.  Attempted encounter. Patient is named Ayden Simpson. Patient's caretaker is named Mitzi. Patient has a sitter present in the room as well. Introduced self to patient. Patient did not answer nor respond to any questions given. Provided kind words of encouragement to patient. Provided prayers for patient as well. Patient has been living in the current medical group home since 2024. The goal upon discharge for the patient is facility long term care. Gave space for the patient to rest. Please contact spiritual health services for further assistance needed.    Huber Peters MDiv  Staff    [facility]  Paging Service [phone] (ARIADNE)

## 2024-06-19 NOTE — PROGRESS NOTES
Melody Mcginnis PA-C                       [phone] office             Monday-Friday 8:00 am-4:30 pm  I am not permitted to use \"perfect serve\" use above for contact, thanks.        Gastroenterology Progress Note    June 19, 2024  Admit Date: 6/12/2024         Narrative Assessment and Plan   57 yo M admitted with pneumonia after recent vomiting of tube feeds. His caregiver at his group home, Mitzi Blevins [phone], informs me this is his second bout of aspiration pneumonia this year. Currently on ceftriaxone, azithromycin, and metronidazole, making good progress now maintaining sats >90% on 3L nasal cannula. PEG tube last changed by Dr. Castellanos 1/29/2020 to 20 mm PEG button device.     6/18/24: Mild elevation in ALT (115) and AST (110), no expression of pain with abdominal palpation. Completed course of IV azithromycin, ceftriaxone, and metronidazole for pneumonia.     6/19/24: Patient resting in bed comfortably, caregiver Mitzi at bedside. EGD with PEG conversion to PEG-J cancelled today due to lovenox administration this morning, rescheduled for Friday afternoon.      Impression:  Cerebral palsy  Current PEG tube   Aspiration pneumonia   Elevated AST and ALT      Plan:  Conversion of PEG to PEG-J Friday afternoon at 3PM by Dr. Davis. Please hold lovenox for 24 hours prior to procedure.   Nutritional support with PPN as recommended by dietitian is reasonable until procedure performed and PEG-J is functional for feeding, which would most likely be the day after it is placed.   Melody Mcginnis PA-C    Subjective:   Chief Complaint: PEG tube conversion to PEJ request     History of Present Illness: Mr. Simpson is a 58 year old male with history of cerebral palsy and hip dysplasia, admitted with acute hypoxemic respiratory failure, diagnosed with pneumonia with likely aspiration component from GERD/vomiting tube  feeds 6/14. He is improving with antibiotics for pneumonia and is currently maintaining oxygen saturation above 90% on 3L nasal cannula.     6/18/24: Patient resting in bed, appears comfortable. No apparent abdominal tenderness.     ROS:  The previous review of systems on initial consultation / H&P is noted and reviewed.  Specific changes noted above in HPI.    Current Medications:     Current Facility-Administered Medications   Medication Dose Route Frequency    methylPREDNISolone sodium succ (SOLU-MEDROL) 20 mg in sterile water 0.5 mL injection  20 mg IntraVENous Q12H    PN-Adult Premix  4.25/10 - Standard Electrolytes - Peripheral Line   IntraVENous Continuous TPN    fat emulsion (INTRALIPID/NUTRILIPID) 20 % infusion 250 mL  250 mL IntraVENous Once per day on Mon Wed    ondansetron (ZOFRAN) injection 4 mg  4 mg IntraVENous Q6H PRN    LORazepam (ATIVAN) tablet 2 mg  2 mg Per G Tube BID    LORazepam (ATIVAN) injection 0.5 mg  0.5 mg IntraVENous Q6H PRN    glycopyrrolate (ROBINUL) injection 0.1 mg  0.1 mg IntraVENous Q4H PRN    lactobacillus (CULTURELLE) capsule 1 capsule  1 capsule PEG Tube Daily with breakfast    sodium chloride nebulizer 0.9 % solution 3 mL  3 mL Nebulization Q4H PRN    ipratropium 0.5 mg-albuterol 2.5 mg (DUONEB) nebulizer solution 1 Dose  1 Dose Inhalation 4x Daily RT    racepinephrine HCl (VAPONEFPRIN) 2.25 % nebulizer solution NEBU 0.5 mL  0.5 mL Nebulization Once    ferrous Sulfate 300 (60 Fe) MG/5ML solution 300 mg  300 mg Per G Tube Daily    guaiFENesin (ROBITUSSIN) 100 MG/5ML liquid 400 mg  400 mg Per G Tube BID    cetirizine (ZYRTEC) tablet 5 mg  5 mg Per G Tube Daily    mirtazapine (REMERON) tablet 15 mg  15 mg Per G Tube Nightly    pantoprazole (PROTONIX) 40 mg in sodium chloride (PF) 0.9 % 10 mL injection  40 mg IntraVENous Q12H    bisacodyl (DULCOLAX) suppository 10 mg  10 mg Rectal Daily PRN    acetaminophen (TYLENOL) 160 MG/5ML solution 650 mg  650 mg Per G Tube BID

## 2024-06-19 NOTE — PROGRESS NOTES
[facility]  [address] [place] [address] [phone]      Medical Progress Note      NAME: Ayden Simpson   :  1965  MRM:  [id number]    Date/Time of service: 2024         Subjective:     Chief Complaint:  Patient was personally seen and examined by me during this time period.  Chart reviewed.  Follow-up aspiration pneumonia.  He is on 2 L nasal cannula.  Unable to obtain reliable review of systems due to patient's baseline nonverbal state.  Spoke with wife at bedside and discussed plan of care.           Objective:       Vitals:       Last 24hrs VS reviewed since prior progress note. Most recent are:    Vitals:    24 1518   BP: 131/79   Pulse: 75   Resp: 18   Temp: 99 °F (37.2 °C)   SpO2: 96%     SpO2 Readings from Last 6 Encounters:   24 96%          Intake/Output Summary (Last 24 hours) at 2024 1546  Last data filed at 2024 1347  Gross per 24 hour   Intake 100 ml   Output 600 ml   Net -500 ml        Exam:     Physical Exam:    Gen: Ill-appearing  HEENT:  Pink conjunctivae, PERRL  Resp:  No accessory muscle use, clear breath sounds without wheezes rales or rhonchi  Card:  RRR, No murmurs, normal S1, S2  Abd: PEG tube  Lymph:  No cervical adenopathy  Musc:  No cyanosis or clubbing  Skin:  No rashes or ulcers, skin turgor is good  Neuro: Nonverbal, contracted upper and lower extremities  Psych: Poor insight      Medications Reviewed: (see below)    Lab Data Reviewed: (see below)    ______________________________________________________________________    Medications:     Current Facility-Administered Medications   Medication Dose Route Frequency    methylPREDNISolone sodium succ (SOLU-MEDROL) 20 mg in sterile water 0.5 mL injection  20 mg IntraVENous Q12H    PN-Adult Premix  4.25/10 - Standard Electrolytes - Peripheral Line   IntraVENous Continuous TPN    fat emulsion (INTRALIPID/NUTRILIPID) 20 % infusion 250 mL  250 mL IntraVENous Once  home           ___________________________________________________    Attending Physician: Bri Thompson DO

## 2024-06-19 NOTE — PLAN OF CARE
Problem: Discharge Planning  Goal: Discharge to home or other facility with appropriate resources  Outcome: Progressing     Problem: Safety - Adult  Goal: Free from fall injury  Outcome: Progressing     Problem: Nutrition Deficit:  Goal: Optimize nutritional status  Outcome: Progressing     Problem: Pain  Goal: Verbalizes/displays adequate comfort level or baseline comfort level  Outcome: Progressing     Problem: Skin/Tissue Integrity  Goal: Absence of new skin breakdown  Description: 1.  Monitor for areas of redness and/or skin breakdown  2.  Assess vascular access sites hourly  3.  Every 4-6 hours minimum:  Change oxygen saturation probe site  4.  Every 4-6 hours:  If on nasal continuous positive airway pressure, respiratory therapy assess nares and determine need for appliance change or resting period.  Outcome: Progressing     Problem: Respiratory - Adult  Goal: Achieves optimal ventilation and oxygenation  6/19/2024 0922 by Dada Michel RN  Outcome: Progressing  6/18/2024 2005 by Michelle Garcia RCP  Outcome: Progressing

## 2024-06-19 NOTE — PROGRESS NOTES
Name: Ayden Simpson Hospital: [facility]   : 1965 Admit Date: 2024   Phone: [phone]  Room: [facility]/   PCP: Lizzie Durham, SKYLER - CNP  MRN: [id number]   Date: 2024  Code: Full Code          Chart and notes reviewed. Data reviewed. I review the patient's current medications in the medical record at each encounter.  I have evaluated and examined the patient.       Mr. Simpson is a 58 year old male with history of cerebral palsy (nonverbal, chronic encephalopathy/microcephaly), bladder mass, and GERD, admitted to [facility] on  with respiratory failure and pneumonia.  He was initially treated with CTX/azithro with Flagyl subsequently added.  Stridor also noted following admission.  Overnight on , he had an episode of vomiting after completion of tube feeling.  Tube feeds have been held since that time.  Surgery has been consulted to opine of transition to J tube due to concerns for reflux.  He is now on HF O2 at 30L and 30% FiO2.  Primary team discussed GOC with POA who feels patient has a good quality of life at baseline wishes for patient to remain full cide, including CPR/intubation if needed.     Not able to obtain hx/ROS from patient due to underlying medical condition.     chest CT personally reviewed.  There is patchy groundglass infiltrate in the left upper and left lower lobes.     CXR personally reviewed.  Lung volumes are low, with persistent left basilar infiltrate and diffuse increasing interstitial infiltrate.      KUB: Nonobstructive bowel gas pattern.    proBNP 676  WBC 18.3 - trending down since admission (29.5)  Blood cx no growth x 2 days  MRSA screen neg   ABG 7.44/33/101/22    Interval history  Afebrile  BP stable  Sats 96% on 3L  800ml UOP documented  K 4.0 - better  LFTs elevated  WBC 10.1   blood cx no growth x 5 days final     CXR: Moderate pulmonary edema      abd US: Possible small stone in the gallbladder. No

## 2024-06-19 NOTE — PROGRESS NOTES
Comprehensive Nutrition Assessment    Type and Reason for Visit: Reassess    Nutrition Recommendations/Plan:   Provide PPN per below:    Day 1: 1008 mL, 101 g carbs, 42 g protein (D10, AA4.25% @ 42 mL/hour)  Day 2: 1512 mL, 151 g carbs, 64 g protein (D10, AA4.25% @ 63 mL/hour)    Lipids: 250 mL of 20% Lipids 2x/week (Wed & Mon)     Check Triglycerides with Friday labs if Lipids are still needed   Monitor K, Phos and Mg until stable x 3 days with nutrition, replace PRN  Once PEG-J placed, cleared for use and TF is tolerated - may stop PPN   PEG-J recommendations to follow successful placement       PPN at goal rate 63 mL/hour with lipids 2x/week provides 913 kcal (83% needs); 64 g protein (100% needs)      Malnutrition Assessment:  Malnutrition Status:  Severe malnutrition (06/19/24 1203)    Context:  Acute Illness     Findings of the 6 clinical characteristics of malnutrition:  Energy Intake:  50% or less of estimated energy requirements for 5 or more days  Weight Loss:  Greater than 2% over 1 week     Body Fat Loss:  Unable to assess (due to CP)     Muscle Mass Loss:  Unable to assess (due to CP)    Fluid Accumulation:  No significant fluid accumulation     Strength:  Not Performed     Nutrition Assessment:    58 year old Male admitted with H/O cerebral palsy [Z86.69]  Sepsis (HCC) [A41.9]  Severe sepsis (HCC) [A41.9, R65.20]  Pneumonia of left lung due to infectious organism, unspecified part of lung [J18.9] who has a past medical history of Anxiety, Cerebral palsy (HCC), Contracted, joint, multiple sites, Developmental non-verbal disorder, Encephalopathy chronic, GERD (gastroesophageal reflux disease), Hip dysplasia, Ill-defined condition, Intellectual disability, Mass of bladder, Microcephaly (HCC), and Muscle spasm.       Pt unable to get PEG-J placed today due to being on blood thinners. Plans to try again tomorrow. Pt has been >7 days without nutrition support with weight loss. Discussed with MD and

## 2024-06-20 LAB
ALBUMIN SERPL-MCNC: 4.8 G/DL (ref 3.5–5)
ALBUMIN/GLOB SERPL: 2 (ref 1.1–2.2)
ALP SERPL-CCNC: 63 U/L (ref 45–117)
ALT SERPL-CCNC: 114 U/L (ref 12–78)
ANION GAP SERPL CALC-SCNC: 8 MMOL/L (ref 5–15)
AST SERPL-CCNC: 32 U/L (ref 15–37)
BASOPHILS # BLD: 0 K/UL (ref 0–0.1)
BASOPHILS NFR BLD: 0 % (ref 0–1)
BILIRUB SERPL-MCNC: 0.8 MG/DL (ref 0.2–1)
BUN SERPL-MCNC: 34 MG/DL (ref 6–20)
BUN/CREAT SERPL: 44 (ref 12–20)
CALCIUM SERPL-MCNC: 9.1 MG/DL (ref 8.5–10.1)
CHLORIDE SERPL-SCNC: 108 MMOL/L (ref 97–108)
CO2 SERPL-SCNC: 25 MMOL/L (ref 21–32)
CREAT SERPL-MCNC: 0.78 MG/DL (ref 0.7–1.3)
DIFFERENTIAL METHOD BLD: ABNORMAL
EOSINOPHIL # BLD: 0 K/UL (ref 0–0.4)
EOSINOPHIL NFR BLD: 0 % (ref 0–7)
ERYTHROCYTE [DISTWIDTH] IN BLOOD BY AUTOMATED COUNT: 12.2 % (ref 11.5–14.5)
GLOBULIN SER CALC-MCNC: 2.4 G/DL (ref 2–4)
GLUCOSE BLD STRIP.AUTO-MCNC: 164 MG/DL (ref 65–117)
GLUCOSE SERPL-MCNC: 183 MG/DL (ref 65–100)
HCT VFR BLD AUTO: 43.8 % (ref 36.6–50.3)
HGB BLD-MCNC: 15.3 G/DL (ref 12.1–17)
IMM GRANULOCYTES # BLD AUTO: 0 K/UL
IMM GRANULOCYTES NFR BLD AUTO: 0 %
LYMPHOCYTES # BLD: 1.2 K/UL (ref 0.8–3.5)
LYMPHOCYTES NFR BLD: 12 % (ref 12–49)
MAGNESIUM SERPL-MCNC: 2.6 MG/DL (ref 1.6–2.4)
MCH RBC QN AUTO: 32.8 PG (ref 26–34)
MCHC RBC AUTO-ENTMCNC: 34.9 G/DL (ref 30–36.5)
MCV RBC AUTO: 93.8 FL (ref 80–99)
MONOCYTES # BLD: 0.3 K/UL (ref 0–1)
MONOCYTES NFR BLD: 3 % (ref 5–13)
NEUTS BAND NFR BLD MANUAL: 1 % (ref 0–6)
NEUTS SEG # BLD: 8.9 K/UL (ref 1.8–8)
NEUTS SEG NFR BLD: 84 % (ref 32–75)
NRBC # BLD: 0 K/UL (ref 0–0.01)
NRBC BLD-RTO: 0 PER 100 WBC
PHOSPHATE SERPL-MCNC: 2.6 MG/DL (ref 2.6–4.7)
PLATELET # BLD AUTO: 223 K/UL (ref 150–400)
PMV BLD AUTO: 10 FL (ref 8.9–12.9)
POTASSIUM SERPL-SCNC: 3.5 MMOL/L (ref 3.5–5.1)
PROT SERPL-MCNC: 7.2 G/DL (ref 6.4–8.2)
RBC # BLD AUTO: 4.67 M/UL (ref 4.1–5.7)
RBC MORPH BLD: ABNORMAL
SERVICE CMNT-IMP: ABNORMAL
SODIUM SERPL-SCNC: 141 MMOL/L (ref 136–145)
WBC # BLD AUTO: 10.4 K/UL (ref 4.1–11.1)

## 2024-06-20 PROCEDURE — 6370000000 HC RX 637 (ALT 250 FOR IP): Performed by: INTERNAL MEDICINE

## 2024-06-20 PROCEDURE — 85025 COMPLETE CBC W/AUTO DIFF WBC: CPT

## 2024-06-20 PROCEDURE — 36415 COLL VENOUS BLD VENIPUNCTURE: CPT

## 2024-06-20 PROCEDURE — 2060000000 HC ICU INTERMEDIATE R&B

## 2024-06-20 PROCEDURE — 6360000002 HC RX W HCPCS: Performed by: PHYSICIAN ASSISTANT

## 2024-06-20 PROCEDURE — 2500000003 HC RX 250 WO HCPCS: Performed by: INTERNAL MEDICINE

## 2024-06-20 PROCEDURE — 6370000000 HC RX 637 (ALT 250 FOR IP): Performed by: HOSPITALIST

## 2024-06-20 PROCEDURE — 94640 AIRWAY INHALATION TREATMENT: CPT

## 2024-06-20 PROCEDURE — 2580000003 HC RX 258: Performed by: INTERNAL MEDICINE

## 2024-06-20 PROCEDURE — 94761 N-INVAS EAR/PLS OXIMETRY MLT: CPT

## 2024-06-20 PROCEDURE — 80053 COMPREHEN METABOLIC PANEL: CPT

## 2024-06-20 PROCEDURE — 94668 MNPJ CHEST WALL SBSQ: CPT

## 2024-06-20 PROCEDURE — 82962 GLUCOSE BLOOD TEST: CPT

## 2024-06-20 PROCEDURE — 2580000003 HC RX 258: Performed by: HOSPITALIST

## 2024-06-20 PROCEDURE — 6360000002 HC RX W HCPCS: Performed by: INTERNAL MEDICINE

## 2024-06-20 PROCEDURE — 6370000000 HC RX 637 (ALT 250 FOR IP)

## 2024-06-20 PROCEDURE — 2580000003 HC RX 258: Performed by: PHYSICIAN ASSISTANT

## 2024-06-20 PROCEDURE — 84100 ASSAY OF PHOSPHORUS: CPT

## 2024-06-20 PROCEDURE — C9113 INJ PANTOPRAZOLE SODIUM, VIA: HCPCS | Performed by: INTERNAL MEDICINE

## 2024-06-20 PROCEDURE — 83735 ASSAY OF MAGNESIUM: CPT

## 2024-06-20 RX ORDER — IPRATROPIUM BROMIDE AND ALBUTEROL SULFATE 2.5; .5 MG/3ML; MG/3ML
1 SOLUTION RESPIRATORY (INHALATION)
Status: DISCONTINUED | OUTPATIENT
Start: 2024-06-20 | End: 2024-06-28 | Stop reason: HOSPADM

## 2024-06-20 RX ADMIN — GUAIFENESIN 400 MG: 200 SOLUTION ORAL at 10:21

## 2024-06-20 RX ADMIN — IPRATROPIUM BROMIDE AND ALBUTEROL SULFATE 1 DOSE: .5; 3 SOLUTION RESPIRATORY (INHALATION) at 19:08

## 2024-06-20 RX ADMIN — ASCORBIC ACID, VITAMIN A PALMITATE, CHOLECALCIFEROL, THIAMINE HYDROCHLORIDE, RIBOFLAVIN-5 PHOSPHATE SODIUM, PYRIDOXINE HYDROCHLORIDE, NIACINAMIDE, DEXPANTHENOL, ALPHA-TOCOPHEROL ACETATE, VITAMIN K1, FOLIC ACID, BIOTIN, CYANOCOBALAMIN: 200; 3300; 200; 6; 3.6; 6; 40; 15; 10; 150; 600; 60; 5 INJECTION, SOLUTION INTRAVENOUS at 18:28

## 2024-06-20 RX ADMIN — Medication 300 MG: at 10:22

## 2024-06-20 RX ADMIN — RISPERIDONE 1 MG: 1 SOLUTION ORAL at 21:37

## 2024-06-20 RX ADMIN — GLYCOPYRROLATE 1 MG: 1 TABLET ORAL at 10:24

## 2024-06-20 RX ADMIN — WATER 20 MG: 1 INJECTION INTRAMUSCULAR; INTRAVENOUS; SUBCUTANEOUS at 01:18

## 2024-06-20 RX ADMIN — GLYCOPYRROLATE 1 MG: 1 TABLET ORAL at 14:55

## 2024-06-20 RX ADMIN — RISPERIDONE 1 MG: 1 SOLUTION ORAL at 10:25

## 2024-06-20 RX ADMIN — BACLOFEN 10 MG: 10 TABLET ORAL at 14:55

## 2024-06-20 RX ADMIN — SODIUM CHLORIDE, PRESERVATIVE FREE 40 MG: 5 INJECTION INTRAVENOUS at 10:21

## 2024-06-20 RX ADMIN — SODIUM CHLORIDE, PRESERVATIVE FREE 10 ML: 5 INJECTION INTRAVENOUS at 21:37

## 2024-06-20 RX ADMIN — RISPERIDONE 1 MG: 1 SOLUTION ORAL at 15:00

## 2024-06-20 RX ADMIN — LORAZEPAM 2 MG: 1 TABLET ORAL at 21:36

## 2024-06-20 RX ADMIN — GLYCOPYRROLATE 1 MG: 1 TABLET ORAL at 21:36

## 2024-06-20 RX ADMIN — AZELASTINE HYDROCHLORIDE 2 SPRAY: 137 SPRAY, METERED NASAL at 10:25

## 2024-06-20 RX ADMIN — CETIRIZINE HYDROCHLORIDE 5 MG: 10 TABLET, FILM COATED ORAL at 10:24

## 2024-06-20 RX ADMIN — MIRTAZAPINE 15 MG: 15 TABLET, FILM COATED ORAL at 21:35

## 2024-06-20 RX ADMIN — IPRATROPIUM BROMIDE AND ALBUTEROL SULFATE 1 DOSE: .5; 3 SOLUTION RESPIRATORY (INHALATION) at 13:37

## 2024-06-20 RX ADMIN — IPRATROPIUM BROMIDE 2 SPRAY: 21 SPRAY NASAL at 21:38

## 2024-06-20 RX ADMIN — BACLOFEN 10 MG: 10 TABLET ORAL at 21:35

## 2024-06-20 RX ADMIN — LORAZEPAM 2 MG: 1 TABLET ORAL at 10:24

## 2024-06-20 RX ADMIN — BACLOFEN 10 MG: 10 TABLET ORAL at 10:25

## 2024-06-20 RX ADMIN — IPRATROPIUM BROMIDE 2 SPRAY: 21 SPRAY NASAL at 10:25

## 2024-06-20 RX ADMIN — SODIUM CHLORIDE, PRESERVATIVE FREE 40 MG: 5 INJECTION INTRAVENOUS at 21:36

## 2024-06-20 RX ADMIN — AZELASTINE HYDROCHLORIDE 2 SPRAY: 137 SPRAY, METERED NASAL at 21:38

## 2024-06-20 RX ADMIN — IPRATROPIUM BROMIDE AND ALBUTEROL SULFATE 1 DOSE: .5; 3 SOLUTION RESPIRATORY (INHALATION) at 07:28

## 2024-06-20 RX ADMIN — GUAIFENESIN 400 MG: 200 SOLUTION ORAL at 21:36

## 2024-06-20 RX ADMIN — WATER 20 MG: 1 INJECTION INTRAMUSCULAR; INTRAVENOUS; SUBCUTANEOUS at 14:54

## 2024-06-20 RX ADMIN — Medication 1 CAPSULE: at 10:23

## 2024-06-20 ASSESSMENT — PAIN SCALES - WONG BAKER
WONGBAKER_NUMERICALRESPONSE: NO HURT

## 2024-06-20 NOTE — PROGRESS NOTES
[facility]IT[facility] VALEN[facility]  [address] [place] [address] [phone]      Medical Progress Note      NAME: Ayden Simpson   :  1965  MRM:  [id number]    Date/Time of service: 2024         Subjective:     Chief Complaint:  Patient was personally seen and examined by me during this time period.  Chart reviewed.  Follow-up aspiration pneumonia.  He is on 2 L nasal cannula.  Unable to obtain reliable review of systems due to patient's baseline nonverbal state.            Objective:       Vitals:       Last 24hrs VS reviewed since prior progress note. Most recent are:    Vitals:    24 0854   BP: 131/83   Pulse: 66   Resp: 17   Temp: 98.6 °F (37 °C)   SpO2: 98%     SpO2 Readings from Last 6 Encounters:   24 98%          Intake/Output Summary (Last 24 hours) at 2024 1252  Last data filed at 2024 0313  Gross per 24 hour   Intake 150 ml   Output 650 ml   Net -500 ml          Exam:     Physical Exam:    Gen: Ill-appearing  HEENT:  Pink conjunctivae, PERRL  Resp:  No accessory muscle use, clear breath sounds without wheezes rales or rhonchi  Card:  RRR, No murmurs, normal S1, S2  Abd: PEG tube  Lymph:  No cervical adenopathy  Musc:  No cyanosis or clubbing  Skin:  No rashes or ulcers, skin turgor is good  Neuro: Nonverbal, contracted upper and lower extremities  Psych: Poor insight      Medications Reviewed: (see below)    Lab Data Reviewed: (see below)    ______________________________________________________________________    Medications:     Current Facility-Administered Medications   Medication Dose Route Frequency    ipratropium 0.5 mg-albuterol 2.5 mg (DUONEB) nebulizer solution 1 Dose  1 Dose Inhalation Q6H WA RT    PN-Adult Premix  4.25/10 - Standard Electrolytes - Peripheral Line   IntraVENous Continuous TPN    methylPREDNISolone sodium succ (SOLU-MEDROL) 20 mg in sterile water 0.5 mL injection  20 mg IntraVENous Q12H    PN-Adult Premix  4.25/10 -  Physician: Bri Thompson, DO

## 2024-06-20 NOTE — CARE COORDINATION
CM Note:  Pt now on RA and has completed the course of IV abx.  GI following-plan for exchange of PEG for J-tube on 6/21.  Pt to return to [facility] at d/c.   Group home will transport him at d/c    BOBBY John  6/20/2024  4:06 PM

## 2024-06-20 NOTE — PROGRESS NOTES
Name: Ayden Simpson Hospital: [facility]   : 1965 Admit Date: 2024   Phone: [phone]  Room: [facility]/   PCP: Lizzie Durham, SKYLER - CNP  MRN: [id number]   Date: 2024  Code: Full Code          Chart and notes reviewed. Data reviewed. I review the patient's current medications in the medical record at each encounter.  I have evaluated and examined the patient.       Mr. Simpson is a 58 year old male with history of cerebral palsy (nonverbal, chronic encephalopathy/microcephaly), bladder mass, and GERD, admitted to [facility] on  with respiratory failure and pneumonia.  He was initially treated with CTX/azithro with Flagyl subsequently added.  Stridor also noted following admission.  Overnight on , he had an episode of vomiting after completion of tube feeling.  Tube feeds have been held since that time.  Surgery has been consulted to opine of transition to J tube due to concerns for reflux.  He is now on HF O2 at 30L and 30% FiO2.  Primary team discussed GOC with POA who feels patient has a good quality of life at baseline wishes for patient to remain full cide, including CPR/intubation if needed.     Not able to obtain hx/ROS from patient due to underlying medical condition.     chest CT personally reviewed.  There is patchy groundglass infiltrate in the left upper and left lower lobes.     CXR personally reviewed.  Lung volumes are low, with persistent left basilar infiltrate and diffuse increasing interstitial infiltrate.      KUB: Nonobstructive bowel gas pattern.    proBNP 676  WBC 18.3 - trending down since admission (29.5)  Blood cx no growth x 2 days  MRSA screen neg   ABG 7.44/33/101/22    Interval history  Afebrile  BP stable  Sats 96% on RA  650ml UOP documented  K 3.5  LFTs better, ALT mildly elevated  WBC 10.4   blood cx no growth x 5 days final   MRSA neg     CXR: Moderate pulmonary edema      abd US: Possible small stone in

## 2024-06-20 NOTE — PROGRESS NOTES
0.5 mL Nebulization Q4H PRN    azelastine (ASTELIN) 0.1 % nasal spray 2 spray  2 spray Each Nostril BID    baclofen (LIORESAL) tablet 10 mg  10 mg PEG Tube TID    ipratropium (ATROVENT) 0.03 % nasal spray 2 spray  2 spray Nasal BID    risperiDONE (RisperDAL) 1 MG/ML oral solution 1 mg  1 mg PEG Tube TID    risperiDONE (RisperDAL) 1 MG/ML oral solution 1 mg  1 mg Oral Q8H PRN    sodium chloride flush 0.9 % injection 5-40 mL  5-40 mL IntraVENous 2 times per day    sodium chloride flush 0.9 % injection 5-40 mL  5-40 mL IntraVENous PRN    0.9 % sodium chloride infusion   IntraVENous PRN    [Held by provider] enoxaparin Sodium (LOVENOX) injection 30 mg  30 mg SubCUTAneous Daily    glycopyrrolate (ROBINUL) tablet 1 mg  1 mg Per G Tube TID       Objective:     VITALS:   Last 24hrs VS reviewed since prior progress note. Most recent are:  Vitals:    24 0728   BP:    Pulse: 76   Resp: 16   Temp:    SpO2: 96%     Temp (24hrs), Av.3 °F (36.8 °C), Min:97.9 °F (36.6 °C), Max:99 °F (37.2 °C)      Intake/Output Summary (Last 24 hours) at 2024 0849  Last data filed at 2024 0313  Gross per 24 hour   Intake 150 ml   Output 650 ml   Net -500 ml       EXAM:  General:  Comfortable, no distress    HEENT:  Atraumatic skull, pupils equal  Lungs:   No cough or increased work of breathing.   Abdomen:  Non-distended, non-tender.  No mass, guarding or rebound. PEG tube in place.   Musc:   Contractures in bilateral hands   Psych:    Nonverbal, makes eye contact   Derm:   No rash, jaundice, nor palpable dermatologic mass    Lab Data Reviewed:   Recent Labs     24  0125 24  0147 24  021   WBC 9.5 10.1 10.4   HGB 12.8 13.9 15.3   HCT 38.2 41.1 43.8    216 223     Recent Labs     24  0125 24  0147 24  0219    138 141   K 2.8* 4.0 3.5    108 108   CO2 28 24 25   BUN 21* 27* 34*   MG 2.5* 2.7* 2.6*   PHOS  --   --  2.6   * 135* 114*     No results found for:

## 2024-06-21 ENCOUNTER — APPOINTMENT (OUTPATIENT)
Facility: HOSPITAL | Age: 59
End: 2024-06-21
Payer: MEDICAID

## 2024-06-21 ENCOUNTER — ANESTHESIA (OUTPATIENT)
Facility: HOSPITAL | Age: 59
End: 2024-06-21
Payer: MEDICAID

## 2024-06-21 ENCOUNTER — ANESTHESIA EVENT (OUTPATIENT)
Facility: HOSPITAL | Age: 59
End: 2024-06-21
Payer: MEDICAID

## 2024-06-21 LAB
ALBUMIN SERPL-MCNC: 4.4 G/DL (ref 3.5–5)
ALBUMIN/GLOB SERPL: 1.8 (ref 1.1–2.2)
ALP SERPL-CCNC: 62 U/L (ref 45–117)
ALT SERPL-CCNC: 85 U/L (ref 12–78)
ANION GAP SERPL CALC-SCNC: 8 MMOL/L (ref 5–15)
AST SERPL-CCNC: 17 U/L (ref 15–37)
BASOPHILS # BLD: 0 K/UL (ref 0–0.1)
BASOPHILS NFR BLD: 0 % (ref 0–1)
BILIRUB SERPL-MCNC: 1.5 MG/DL (ref 0.2–1)
BUN SERPL-MCNC: 34 MG/DL (ref 6–20)
BUN/CREAT SERPL: 45 (ref 12–20)
CALCIUM SERPL-MCNC: 8.9 MG/DL (ref 8.5–10.1)
CHLORIDE SERPL-SCNC: 110 MMOL/L (ref 97–108)
CO2 SERPL-SCNC: 23 MMOL/L (ref 21–32)
CREAT SERPL-MCNC: 0.75 MG/DL (ref 0.7–1.3)
DIFFERENTIAL METHOD BLD: ABNORMAL
ECHO AO ASC DIAM: 2.6 CM
ECHO AO ASCENDING AORTA INDEX: 1.94 CM/M2
ECHO AO ROOT DIAM: 2.9 CM
ECHO AO ROOT INDEX: 2.16 CM/M2
ECHO AV AREA PEAK VELOCITY: 1.7 CM2
ECHO AV AREA VTI: 1.8 CM2
ECHO AV AREA/BSA PEAK VELOCITY: 1.3 CM2/M2
ECHO AV AREA/BSA VTI: 1.3 CM2/M2
ECHO AV MEAN GRADIENT: 3 MMHG
ECHO AV MEAN VELOCITY: 0.8 M/S
ECHO AV PEAK GRADIENT: 6 MMHG
ECHO AV PEAK VELOCITY: 1.3 M/S
ECHO AV VELOCITY RATIO: 0.77
ECHO AV VTI: 19 CM
ECHO BSA: 1.35 M2
ECHO LA DIAMETER INDEX: 1.87 CM/M2
ECHO LA DIAMETER: 2.5 CM
ECHO LA TO AORTIC ROOT RATIO: 0.86
ECHO LA VOL A-L A4C: 24 ML (ref 18–58)
ECHO LA VOL MOD A4C: 19 ML (ref 18–58)
ECHO LA VOLUME INDEX A-L A4C: 18 ML/M2 (ref 16–34)
ECHO LA VOLUME INDEX MOD A4C: 14 ML/M2 (ref 16–34)
ECHO LV E' LATERAL VELOCITY: 7 CM/S
ECHO LV E' SEPTAL VELOCITY: 2 CM/S
ECHO LV EDV A4C: 74 ML
ECHO LV EDV INDEX A4C: 55 ML/M2
ECHO LV EJECTION FRACTION A4C: 58 %
ECHO LV ESV A4C: 31 ML
ECHO LV ESV INDEX A4C: 23 ML/M2
ECHO LV FRACTIONAL SHORTENING: 32 % (ref 28–44)
ECHO LV INTERNAL DIMENSION DIASTOLE INDEX: 2.84 CM/M2
ECHO LV INTERNAL DIMENSION DIASTOLIC: 3.8 CM (ref 4.2–5.9)
ECHO LV INTERNAL DIMENSION SYSTOLIC INDEX: 1.94 CM/M2
ECHO LV INTERNAL DIMENSION SYSTOLIC: 2.6 CM
ECHO LV IVSD: 0.8 CM (ref 0.6–1)
ECHO LV MASS 2D: 93.4 G (ref 88–224)
ECHO LV MASS INDEX 2D: 69.7 G/M2 (ref 49–115)
ECHO LV POSTERIOR WALL DIASTOLIC: 0.9 CM (ref 0.6–1)
ECHO LV RELATIVE WALL THICKNESS RATIO: 0.47
ECHO LVOT AREA: 2.3 CM2
ECHO LVOT AV VTI INDEX: 0.82
ECHO LVOT DIAM: 1.7 CM
ECHO LVOT MEAN GRADIENT: 2 MMHG
ECHO LVOT PEAK GRADIENT: 4 MMHG
ECHO LVOT PEAK VELOCITY: 1 M/S
ECHO LVOT STROKE VOLUME INDEX: 26.2 ML/M2
ECHO LVOT SV: 35.2 ML
ECHO LVOT VTI: 15.5 CM
ECHO MV A VELOCITY: 1.09 M/S
ECHO MV AREA VTI: 2.2 CM2
ECHO MV E DECELERATION TIME (DT): 237 MS
ECHO MV E VELOCITY: 0.54 M/S
ECHO MV E/A RATIO: 0.5
ECHO MV E/E' LATERAL: 7.71
ECHO MV E/E' RATIO (AVERAGED): 17.36
ECHO MV E/E' SEPTAL: 27
ECHO MV LVOT VTI INDEX: 1.03
ECHO MV MAX VELOCITY: 0.8 M/S
ECHO MV MEAN GRADIENT: 1 MMHG
ECHO MV MEAN VELOCITY: 0.5 M/S
ECHO MV PEAK GRADIENT: 3 MMHG
ECHO MV VTI: 16 CM
ECHO PV MAX VELOCITY: 0.8 M/S
ECHO PV PEAK GRADIENT: 3 MMHG
ECHO RV INTERNAL DIMENSION: 2.3 CM
ECHO RV TAPSE: 1.7 CM (ref 1.7–?)
EOSINOPHIL # BLD: 0 K/UL (ref 0–0.4)
EOSINOPHIL NFR BLD: 0 % (ref 0–7)
ERYTHROCYTE [DISTWIDTH] IN BLOOD BY AUTOMATED COUNT: 12.4 % (ref 11.5–14.5)
GLOBULIN SER CALC-MCNC: 2.5 G/DL (ref 2–4)
GLUCOSE SERPL-MCNC: 131 MG/DL (ref 65–100)
HCT VFR BLD AUTO: 44.4 % (ref 36.6–50.3)
HGB BLD-MCNC: 15.4 G/DL (ref 12.1–17)
IMM GRANULOCYTES # BLD AUTO: 0.1 K/UL (ref 0–0.04)
IMM GRANULOCYTES NFR BLD AUTO: 1 % (ref 0–0.5)
LYMPHOCYTES # BLD: 1 K/UL (ref 0.8–3.5)
LYMPHOCYTES NFR BLD: 11 % (ref 12–49)
MAGNESIUM SERPL-MCNC: 2.7 MG/DL (ref 1.6–2.4)
MCH RBC QN AUTO: 31.9 PG (ref 26–34)
MCHC RBC AUTO-ENTMCNC: 34.7 G/DL (ref 30–36.5)
MCV RBC AUTO: 91.9 FL (ref 80–99)
MONOCYTES # BLD: 0.9 K/UL (ref 0–1)
MONOCYTES NFR BLD: 10 % (ref 5–13)
NEUTS SEG # BLD: 7.3 K/UL (ref 1.8–8)
NEUTS SEG NFR BLD: 78 % (ref 32–75)
NRBC # BLD: 0 K/UL (ref 0–0.01)
NRBC BLD-RTO: 0 PER 100 WBC
PHOSPHATE SERPL-MCNC: 3.4 MG/DL (ref 2.6–4.7)
PLATELET # BLD AUTO: 206 K/UL (ref 150–400)
PMV BLD AUTO: 9.9 FL (ref 8.9–12.9)
POTASSIUM SERPL-SCNC: 3.5 MMOL/L (ref 3.5–5.1)
PROT SERPL-MCNC: 6.9 G/DL (ref 6.4–8.2)
RBC # BLD AUTO: 4.83 M/UL (ref 4.1–5.7)
SODIUM SERPL-SCNC: 141 MMOL/L (ref 136–145)
TRIGL SERPL-MCNC: 150 MG/DL
WBC # BLD AUTO: 9.4 K/UL (ref 4.1–11.1)

## 2024-06-21 PROCEDURE — 36415 COLL VENOUS BLD VENIPUNCTURE: CPT

## 2024-06-21 PROCEDURE — 7100000010 HC PHASE II RECOVERY - FIRST 15 MIN: Performed by: INTERNAL MEDICINE

## 2024-06-21 PROCEDURE — 3700000000 HC ANESTHESIA ATTENDED CARE: Performed by: INTERNAL MEDICINE

## 2024-06-21 PROCEDURE — 83735 ASSAY OF MAGNESIUM: CPT

## 2024-06-21 PROCEDURE — 93306 TTE W/DOPPLER COMPLETE: CPT

## 2024-06-21 PROCEDURE — 3700000001 HC ADD 15 MINUTES (ANESTHESIA): Performed by: INTERNAL MEDICINE

## 2024-06-21 PROCEDURE — C1889 IMPLANT/INSERT DEVICE, NOC: HCPCS | Performed by: INTERNAL MEDICINE

## 2024-06-21 PROCEDURE — 2500000003 HC RX 250 WO HCPCS: Performed by: NURSE ANESTHETIST, CERTIFIED REGISTERED

## 2024-06-21 PROCEDURE — 2580000003 HC RX 258: Performed by: HOSPITALIST

## 2024-06-21 PROCEDURE — 2709999900 HC NON-CHARGEABLE SUPPLY: Performed by: INTERNAL MEDICINE

## 2024-06-21 PROCEDURE — 2580000003 HC RX 258: Performed by: INTERNAL MEDICINE

## 2024-06-21 PROCEDURE — 85025 COMPLETE CBC W/AUTO DIFF WBC: CPT

## 2024-06-21 PROCEDURE — 94640 AIRWAY INHALATION TREATMENT: CPT

## 2024-06-21 PROCEDURE — 93306 TTE W/DOPPLER COMPLETE: CPT | Performed by: INTERNAL MEDICINE

## 2024-06-21 PROCEDURE — 80053 COMPREHEN METABOLIC PANEL: CPT

## 2024-06-21 PROCEDURE — 2720000010 HC SURG SUPPLY STERILE: Performed by: INTERNAL MEDICINE

## 2024-06-21 PROCEDURE — 6370000000 HC RX 637 (ALT 250 FOR IP): Performed by: HOSPITALIST

## 2024-06-21 PROCEDURE — 1100000000 HC RM PRIVATE

## 2024-06-21 PROCEDURE — 6370000000 HC RX 637 (ALT 250 FOR IP)

## 2024-06-21 PROCEDURE — 2500000003 HC RX 250 WO HCPCS: Performed by: INTERNAL MEDICINE

## 2024-06-21 PROCEDURE — C9113 INJ PANTOPRAZOLE SODIUM, VIA: HCPCS | Performed by: INTERNAL MEDICINE

## 2024-06-21 PROCEDURE — 6360000002 HC RX W HCPCS: Performed by: NURSE ANESTHETIST, CERTIFIED REGISTERED

## 2024-06-21 PROCEDURE — 7100000011 HC PHASE II RECOVERY - ADDTL 15 MIN: Performed by: INTERNAL MEDICINE

## 2024-06-21 PROCEDURE — 6370000000 HC RX 637 (ALT 250 FOR IP): Performed by: INTERNAL MEDICINE

## 2024-06-21 PROCEDURE — 0DHA8UZ INSERTION OF FEEDING DEVICE INTO JEJUNUM, VIA NATURAL OR ARTIFICIAL OPENING ENDOSCOPIC: ICD-10-PCS | Performed by: INTERNAL MEDICINE

## 2024-06-21 PROCEDURE — 6360000002 HC RX W HCPCS: Performed by: INTERNAL MEDICINE

## 2024-06-21 PROCEDURE — 3600007512: Performed by: INTERNAL MEDICINE

## 2024-06-21 PROCEDURE — 84100 ASSAY OF PHOSPHORUS: CPT

## 2024-06-21 PROCEDURE — 74018 RADEX ABDOMEN 1 VIEW: CPT

## 2024-06-21 PROCEDURE — 3600007502: Performed by: INTERNAL MEDICINE

## 2024-06-21 PROCEDURE — 84478 ASSAY OF TRIGLYCERIDES: CPT

## 2024-06-21 PROCEDURE — 0DJ08ZZ INSPECTION OF UPPER INTESTINAL TRACT, VIA NATURAL OR ARTIFICIAL OPENING ENDOSCOPIC: ICD-10-PCS | Performed by: INTERNAL MEDICINE

## 2024-06-21 PROCEDURE — 94761 N-INVAS EAR/PLS OXIMETRY MLT: CPT

## 2024-06-21 RX ORDER — PROPOFOL 10 MG/ML
INJECTION, EMULSION INTRAVENOUS CONTINUOUS PRN
Status: DISCONTINUED | OUTPATIENT
Start: 2024-06-21 | End: 2024-06-21 | Stop reason: SDUPTHER

## 2024-06-21 RX ORDER — SODIUM CHLORIDE 0.9 % (FLUSH) 0.9 %
5-40 SYRINGE (ML) INJECTION PRN
Status: CANCELLED | OUTPATIENT
Start: 2024-06-21

## 2024-06-21 RX ORDER — LIDOCAINE HYDROCHLORIDE 20 MG/ML
INJECTION, SOLUTION INTRAVENOUS PRN
Status: DISCONTINUED | OUTPATIENT
Start: 2024-06-21 | End: 2024-06-21 | Stop reason: SDUPTHER

## 2024-06-21 RX ORDER — DEXMEDETOMIDINE HYDROCHLORIDE 100 UG/ML
INJECTION, SOLUTION INTRAVENOUS PRN
Status: DISCONTINUED | OUTPATIENT
Start: 2024-06-21 | End: 2024-06-21 | Stop reason: SDUPTHER

## 2024-06-21 RX ORDER — SODIUM CHLORIDE 0.9 % (FLUSH) 0.9 %
5-40 SYRINGE (ML) INJECTION EVERY 12 HOURS SCHEDULED
Status: CANCELLED | OUTPATIENT
Start: 2024-06-21

## 2024-06-21 RX ORDER — SODIUM CHLORIDE 9 MG/ML
25 INJECTION, SOLUTION INTRAVENOUS PRN
Status: CANCELLED | OUTPATIENT
Start: 2024-06-21

## 2024-06-21 RX ADMIN — LIDOCAINE HYDROCHLORIDE 40 MG: 20 INJECTION, SOLUTION INTRAVENOUS at 15:04

## 2024-06-21 RX ADMIN — GLYCOPYRROLATE 1 MG: 1 TABLET ORAL at 22:57

## 2024-06-21 RX ADMIN — LIDOCAINE HYDROCHLORIDE 20 MG: 20 INJECTION, SOLUTION INTRAVENOUS at 15:09

## 2024-06-21 RX ADMIN — SODIUM CHLORIDE, PRESERVATIVE FREE 40 MG: 5 INJECTION INTRAVENOUS at 10:45

## 2024-06-21 RX ADMIN — MIRTAZAPINE 15 MG: 15 TABLET, FILM COATED ORAL at 22:39

## 2024-06-21 RX ADMIN — PROPOFOL 30 MG: 10 INJECTION, EMULSION INTRAVENOUS at 15:17

## 2024-06-21 RX ADMIN — PROPOFOL 30 MG: 10 INJECTION, EMULSION INTRAVENOUS at 15:10

## 2024-06-21 RX ADMIN — PROPOFOL 30 MG: 10 INJECTION, EMULSION INTRAVENOUS at 15:05

## 2024-06-21 RX ADMIN — LORAZEPAM 2 MG: 1 TABLET ORAL at 22:39

## 2024-06-21 RX ADMIN — AZELASTINE HYDROCHLORIDE 2 SPRAY: 137 SPRAY, METERED NASAL at 09:35

## 2024-06-21 RX ADMIN — DEXMEDETOMIDINE 4 MCG: 100 INJECTION, SOLUTION INTRAVENOUS at 15:09

## 2024-06-21 RX ADMIN — PROPOFOL 20 MG: 10 INJECTION, EMULSION INTRAVENOUS at 15:07

## 2024-06-21 RX ADMIN — BACLOFEN 10 MG: 10 TABLET ORAL at 22:39

## 2024-06-21 RX ADMIN — GUAIFENESIN 400 MG: 200 SOLUTION ORAL at 22:36

## 2024-06-21 RX ADMIN — PROPOFOL 80 MCG/KG/MIN: 10 INJECTION, EMULSION INTRAVENOUS at 15:06

## 2024-06-21 RX ADMIN — SODIUM CHLORIDE, PRESERVATIVE FREE 10 ML: 5 INJECTION INTRAVENOUS at 22:40

## 2024-06-21 RX ADMIN — IPRATROPIUM BROMIDE AND ALBUTEROL SULFATE 1 DOSE: .5; 3 SOLUTION RESPIRATORY (INHALATION) at 19:54

## 2024-06-21 RX ADMIN — IPRATROPIUM BROMIDE 2 SPRAY: 21 SPRAY NASAL at 09:35

## 2024-06-21 RX ADMIN — PROPOFOL 20 MG: 10 INJECTION, EMULSION INTRAVENOUS at 15:09

## 2024-06-21 RX ADMIN — SODIUM CHLORIDE, PRESERVATIVE FREE 40 MG: 5 INJECTION INTRAVENOUS at 22:37

## 2024-06-21 RX ADMIN — DEXMEDETOMIDINE 8 MCG: 100 INJECTION, SOLUTION INTRAVENOUS at 15:04

## 2024-06-21 RX ADMIN — IPRATROPIUM BROMIDE AND ALBUTEROL SULFATE 1 DOSE: .5; 3 SOLUTION RESPIRATORY (INHALATION) at 07:21

## 2024-06-21 RX ADMIN — DEXMEDETOMIDINE 4 MCG: 100 INJECTION, SOLUTION INTRAVENOUS at 15:08

## 2024-06-21 RX ADMIN — ASCORBIC ACID, VITAMIN A PALMITATE, CHOLECALCIFEROL, THIAMINE HYDROCHLORIDE, RIBOFLAVIN-5 PHOSPHATE SODIUM, PYRIDOXINE HYDROCHLORIDE, NIACINAMIDE, DEXPANTHENOL, ALPHA-TOCOPHEROL ACETATE, VITAMIN K1, FOLIC ACID, BIOTIN, CYANOCOBALAMIN: 200; 3300; 200; 6; 3.6; 6; 40; 15; 10; 150; 600; 60; 5 INJECTION, SOLUTION INTRAVENOUS at 18:40

## 2024-06-21 ASSESSMENT — PAIN SCALES - WONG BAKER

## 2024-06-21 ASSESSMENT — PAIN - FUNCTIONAL ASSESSMENT: PAIN_FUNCTIONAL_ASSESSMENT: ADULT NONVERBAL PAIN SCALE (NPVS)

## 2024-06-21 NOTE — PERIOP NOTE
Received recovery report from anesthesia team, see anesthesia note. Abdomen remains soft and non-tender post-procedure. Pt has no complaints at this time and tolerated procedure well. Endoscope was pre-cleaned at the bedside by Robert Bruce immediately following procedure. Post recovery report given to Nancy Araujo RN.

## 2024-06-21 NOTE — PROGRESS NOTES
Physician Progress Note      PATIENT:               MONIQUE CODY  CSN #:                  [id number]  :                       1965  ADMIT DATE:       2024 3:42 PM  DISCH DATE:  RESPONDING  PROVIDER #:        Chon Gant MD          QUERY TEXT:    Good Afternoon    This patient admitted on 2024- Severe Sepsis due to Aspiration   Pneumonia.    RD has been following this patient during this admission and on 2024-   has a reassessment.    If possible, please document in progress notes and discharge summary if you   are evaluating and /or treating any of the following:    The medical record reflects the following:  Risk Factors: CP, PEG feed, awaiting for placement of J-Tube, Aspiration   Pneumonia.  Clinical Indicators: Per RD assessment on - Pt with > 7 days without   nutrition support with documented weight loss. Pt has been unable to get the   PEG -J placed due to being on blood thinners. Documented usual  body weight of   100lbs, and weight is down to 97lbs. RD documents > 2 % of weight loss in   over 1 week.  Treatment: RD following, weight, PPN, Lipids, Check triglycerides with labs,   Monitor K, Phos May, One PEG J-placed cleared for use and TF is tolerated may   stop PPN. PEG-J recommendations to follow successful placement.    Thank you  NIECY Adair,RN, CPHQ, CCDS, SMART    ASPEN Criteria:    https://aspenjournals.onlinelibrary.salvador.com/doi/full/10.1177/[email or web address]  5  Options provided:  -- Protein calorie malnutrition severe  -- Other - I will add my own diagnosis  -- Disagree - Not applicable / Not valid  -- Disagree - Clinically unable to determine / Unknown  -- Refer to Clinical Documentation Reviewer    PROVIDER RESPONSE TEXT:    This patient has severe protein calorie malnutrition.    Query created by: Geovanna Pradhan on 2024 10:16 AM      Electronically signed by:  Chon Gant MD 2024 10:49 AM

## 2024-06-21 NOTE — DISCHARGE INSTR - DIET
Good nutrition is important when healing from an illness, injury, or surgery.  Follow any nutrition recommendations given to you during your hospital stay.   If you have any questions about your diet or nutrition, call the hospital and ask for the dietitian.      Registered Dietitian recommendations:     Tube Feeding: Ana Osborne Peptide 1.5 Plain Daily Goal: 875 mL + 720 ml free water. Provides: 1312 kcal, 65 g protein, 120 g carbohydrate, 67 g fat, 13 g fiber, 615 mL of free water + 720 flushes = ~1335 mL of free water daily.     Give 50-60 mL FWF every 2 hours around the clock or while feeding and provide remaining free water via G-port with meds or free water flush as needed to total meet goal daily (~720 mL).     Do NOT flush large volumes by hand into J-port, Do NOT use J-Port for medications, and do NOT check residuals (\"pull back\") from J-port access.       If higher hourly rate is desired/tolerated, reduce time of feeding by no more than 2 hours every 2-3 days while increasing feeding rate by 5-10 mL per hour until desired hours of feeding are reached while maintaining Total Daily Volume Goals as above.     If weight declines over the next 2-3 weeks, increase Total Daily Volume by ~100 mL of TF and water TWICE.         A. This can be done by adding about 5-6 mL/hr to current TF rate and don't forget to increase water flushes to match.         B. Complete this on 2 different days, giving a couple days in between to establish tolerance.         C. This will provide a new total volume goal of 1000 mL/d (1 Liter = 1538 calories) + 800 mL free water daily         D. Monitor weight and tolerance again for the next 2-3 weeks for slight gain and then weight stability. Maintain new goal volumes.         IRIS BARCENAS RD, MS  Office: [phone]

## 2024-06-21 NOTE — ANESTHESIA POSTPROCEDURE EVALUATION
Department of Anesthesiology  Postprocedure Note    Patient: Ayden Simpson  MRN: [id number]  YOB: 1965  Date of evaluation: 6/21/2024    Procedure Summary       Date: 06/21/24 Room / Location: [place] [address] / [facility] ENDOSCOPY    Anesthesia Start: 1500 Anesthesia Stop: 1539    Procedures:       ESOPHAGOGASTRODUODENOSCOPY (Upper GI Region)      ESOPHAGOGASTRODUODENOSCOPY JEJUNOSTOMY TUBE PLACEMENT (Upper GI Region) Diagnosis:       Aspiration into airway, initial encounter      (Aspiration into airway, initial encounter [T17.908A])    Surgeons: Dusty Davis MD Responsible Provider: Jese Alexander MD    Anesthesia Type: MAC ASA Status: 3            Anesthesia Type: MAC    Kai Phase I: Kai Score: 10    Kai Phase II: Kai Score: 8    Anesthesia Post Evaluation    Patient location during evaluation: PACU  Patient participation: complete - patient participated  Level of consciousness: awake  Pain score: 0  Airway patency: patent  Nausea & Vomiting: no nausea and no vomiting  Cardiovascular status: blood pressure returned to baseline  Respiratory status: acceptable  Hydration status: euvolemic  Pain management: adequate    No notable events documented.

## 2024-06-21 NOTE — PLAN OF CARE
Problem: Discharge Planning  Goal: Discharge to home or other facility with appropriate resources  Outcome: Progressing     Problem: Safety - Adult  Goal: Free from fall injury  6/21/2024 1732 by Garrett Warren RN  Outcome: Progressing  Flowsheets (Taken 6/21/2024 1343 by Fay Brar, RN)  Free From Fall Injury:   Instruct family/caregiver on patient safety   Based on caregiver fall risk screen, instruct family/caregiver to ask for assistance with transferring infant if caregiver noted to have fall risk factors  6/21/2024 0539 by Patricia Marti RN  Outcome: Progressing     Problem: Nutrition Deficit:  Goal: Optimize nutritional status  Outcome: Progressing  Flowsheets (Taken 6/21/2024 1548 by Kell Lu, JONATHAN)  Nutrient intake appropriate for improving, restoring, or maintaining nutritional needs: Recommend, monitor, and adjust tube feedings and TPN/PPN based on assessed needs     Problem: Pain  Goal: Verbalizes/displays adequate comfort level or baseline comfort level  6/21/2024 1732 by Garrett Warren RN  Outcome: Progressing  6/21/2024 0539 by Patricia Marti RN  Outcome: Progressing     Problem: Skin/Tissue Integrity  Goal: Absence of new skin breakdown  Description: 1.  Monitor for areas of redness and/or skin breakdown  2.  Assess vascular access sites hourly  3.  Every 4-6 hours minimum:  Change oxygen saturation probe site  4.  Every 4-6 hours:  If on nasal continuous positive airway pressure, respiratory therapy assess nares and determine need for appliance change or resting period.  6/21/2024 1732 by Garrett Warren RN  Outcome: Progressing  6/21/2024 0539 by Patricia Marti RN  Outcome: Progressing     Problem: Respiratory - Adult  Goal: Achieves optimal ventilation and oxygenation  Outcome: Progressing     Problem: ABCDS Injury Assessment  Goal: Absence of physical injury  Outcome: Progressing

## 2024-06-21 NOTE — PERIOP NOTE
TRANSFER - OUT REPORT:    Verbal report given to Garrett 4th Floor RN on Ayden Simpson  being transferred to 4th floor - room 429 for routine post-op       Report consisted of patient's Situation, Background, Assessment and   Recommendations(SBAR).     Information from the following report(s) Nurse Handoff Report, Index, ED Encounter Summary, ED SBAR, Adult Overview, MAR, Recent Results, Med Rec Status, Cardiac Rhythm NSR, Neuro Assessment, and Event Log was reviewed with the receiving nurse.           Lines:   Peripheral IV 06/13/24 Right;Anterior Cephalic (Active)   Site Assessment Clean, dry & intact 06/21/24 1345   Line Status Flushed 06/21/24 1345   Line Care Connections checked and tightened 06/21/24 1345   Phlebitis Assessment No symptoms 06/21/24 1345   Infiltration Assessment 0 06/21/24 1345   Alcohol Cap Used Yes 06/21/24 1345   Dressing Status Clean, dry & intact 06/21/24 1345   Dressing Type Transparent 06/21/24 1345        Opportunity for questions and clarification was provided.      Patient transported with:  Tech

## 2024-06-21 NOTE — ANESTHESIA PRE PROCEDURE
consumption: 06/20/24                        Date of last solid food consumption: 06/20/24    BMI:   Wt Readings from Last 3 Encounters:   06/21/24 44.5 kg (98 lb)   03/22/23 41.8 kg (92 lb 3.2 oz)   11/14/22 36.8 kg (81 lb 3.2 oz)     Body mass index is 20.48 kg/m².    CBC:   Lab Results   Component Value Date/Time    WBC 9.4 06/21/2024 04:15 AM    RBC 4.83 06/21/2024 04:15 AM    HGB 15.4 06/21/2024 04:15 AM    HCT 44.4 06/21/2024 04:15 AM    MCV 91.9 06/21/2024 04:15 AM    RDW 12.4 06/21/2024 04:15 AM     06/21/2024 04:15 AM       CMP:   Lab Results   Component Value Date/Time     06/21/2024 04:15 AM    K 3.5 06/21/2024 04:15 AM     06/21/2024 04:15 AM    CO2 23 06/21/2024 04:15 AM    BUN 34 06/21/2024 04:15 AM    CREATININE 0.75 06/21/2024 04:15 AM    AGRATIO 1.7 07/21/2023 09:39 AM    LABGLOM >90 06/21/2024 04:15 AM    LABGLOM 108 07/21/2023 09:39 AM    GLUCOSE 131 06/21/2024 04:15 AM    GLUCOSE 91 07/21/2023 09:39 AM    CALCIUM 8.9 06/21/2024 04:15 AM    BILITOT 1.5 06/21/2024 04:15 AM    ALKPHOS 62 06/21/2024 04:15 AM    ALKPHOS 125 07/21/2023 09:39 AM    AST 17 06/21/2024 04:15 AM    ALT 85 06/21/2024 04:15 AM       POC Tests:   Recent Labs     06/20/24  0127   POCGLU 164*       Coags: No results found for: \"PROTIME\", \"INR\", \"APTT\"    HCG (If Applicable): No results found for: \"PREGTESTUR\", \"PREGSERUM\", \"HCG\", \"HCGQUANT\"     ABGs:   Lab Results   Component Value Date/Time    PHART 7.44 06/12/2024 08:35 PM    PO2ART 101 06/12/2024 08:35 PM    XVC8ZOP 33 06/12/2024 08:35 PM    FNB4XTL 22 06/12/2024 08:35 PM        Type & Screen (If Applicable):  No results found for: \"LABABO\"    Drug/Infectious Status (If Applicable):  Lab Results   Component Value Date/Time    HEPCAB <0.02 06/17/2024 04:35 AM       COVID-19 Screening (If Applicable):   Lab Results   Component Value Date/Time    COVID19 Not detected 06/12/2024 04:20 PM           Anesthesia Evaluation  Patient summary reviewed and Nursing

## 2024-06-21 NOTE — OP NOTE
.                         [facility]  [facility]  Dusty Davis MD  [phone]      2024    Esophagogastroduodenoscopy (EGD) Procedure Note  Ayden Simpson  : 1965  [facility] Medical Record Number: [id number]      Indications:   PEG exchange to PEG/J   Referring Physician:  Lizzie Durham, APRN - CNP  Anesthesia/Sedation: Monitored anesthesia care, see separate note  Endoscopist:  Dusty Davis MD   Complications:  None  Estimated Blood Loss:  None    Permit:  The indications, risks, benefits and alternatives were reviewed with the patient or their decision maker who was provided an opportunity to ask questions and all questions were answered.  The specific risks of esophagogastroduodenoscopy with conscious sedation were reviewed, including but not limited to anesthetic complication, bleeding, adverse drug reaction, missed lesion, infection, IV site reactions, and intestinal perforation which would lead to the need for surgical repair.  Alternatives to EGD including radiographic imaging, observation without testing, or laboratory testing were reviewed as well as the limitations of those alternatives discussed.  After considering the options and having all their questions answered, the patient or their decision maker provided both verbal and written consent to proceed.       Procedure in Detail:  After obtaining informed consent, positioning of the patient in the left lateral decubitus position, and conduction of a pre-procedure pause or \"time out\" the endoscope was introduced into the mouth and advanced to the duodenum.  A careful inspection was made, and findings or interventions are described below.    Findings:   Esophagus-normal-appearing esophagus and GE junction  Stomach-normal-appearing gastric mucosa, 20 Wolof low-profile TUNG PEG with balloon was located at the distal gastric antrum, nonobstructive, healthy-appearing underlying mucosa,  remainder of gastric mucosa normal-appearing  Duodenum-healthy-appearing mucosa to the D2 segment    The 20 Fijian low-profile TUNG PEG was deflated.  After the balloon and then fully withdrawn under direct visualization.  The 24 Fijian [employer] PEG with right ankle extension was then placed under direct visualization through the gastrostomy insertion site with no significant resistance, correct localization on the gastric side under direct visualization and insufflation of the balloon was performed.  The bumper was then opposed on the abdominal wall allowing for rotation and 1 finger breath through the mature PEG tract at 2 cm on the anterior abdominal wall skin.  A 12 Fijian J extension tube was preloaded onto the 24 Fijian PEG with generous surgical lubrication.  The lariat on the 12 Fijian J-tube extension was then dragged into the proximal jejunum using the gastroscope.  Unfortunately tethering was not successfully achieved as the lariat was during attempted endoscopic clip deployment.  Upon withdrawal of the gastroscope the J extension was noted to be localized in the duodenum      Specimens:  None     Impression:   Removal of low-profile 20 Fijian TUNG PEG and replacement with 24 Fijian right angle bumper PEG with 12 Fijian J-tube extension with passage of the J-tube extension into the duodenum but unable to tether secondary to break in the lariat wire  Normal-appearing mucosa in the esophagus, gastroesophageal junction, stomach and duodenum           Recommendations:   Recommend obtaining an abdominal x-ray/KUB in 2 to 4 hours to assess for propulsion of the J-tube extension  If KUB confirms correct localization of the PEG tube, could reasonably use for feeding purposes through the J port  PEG tube can be immediately used for medication administration through the gastric port  If KUB does not demonstrate correct localization of the J-tube I would favor repeating the x-ray after 24 hours to assess for

## 2024-06-21 NOTE — CARE COORDINATION
CM Note:  Pt to have PEG changed to J-tube today  TPN today; to start tube feedings 6/21; if he tolerates tube feeds, he can d/c to group home  [facility] to transport pt at d/c    BOBBY John  6/21/2024  11:57 AM

## 2024-06-21 NOTE — PROGRESS NOTES
Spiritual Care Assessment/Progress Note  [facility]    Name: Ayden Simpson MRN: [id number]    Age: 58 y.o.     Sex: male   Language: English     Date: 6/21/2024            Total Time Calculated: 21 min              Spiritual Assessment begun in [facility] B3 INTERMEDIATE CARE UNIT  Service Provided For: Patient  Referral/Consult From: Rounding  Encounter Overview/Reason: Follow-up    Spiritual beliefs:      [] Involved in a eddie tradition/spiritual practice:      [] Supported by a eddie community:      [] Claims no spiritual orientation:      [] Seeking spiritual identity:           [] Adheres to an individual form of spirituality:      [x] Not able to assess:                Identified resources for coping and support system:   Support System: Home care staff       [] Prayer                  [] Devotional reading               [] Music                  [] Guided Imagery     [] Pet visits                                        [] Other: (COMMENT)     Specific area/focus of visit   Encounter:    Crisis:    Spiritual/Emotional needs: Type: Spiritual Support  Ritual, Rites and Sacraments: Type:  (Prayer)  Grief, Loss, and Adjustments:    Ethics/Mediation:    Behavioral Health:    Palliative Care:    Advance Care Planning:      Plan/Referrals: Other (Comment) (Contact spiritual health services for further assistance needed.)    Narrative:   Spiritual care assessment for Mr. Ayden Simpson in [facility].  Reviewed chart notes prior to encounter and met Pt bedside. Pt is nonverbal, sitter in room, caregivers not present.  introduced self and spoke kind, supportive words to Pt as he looked on and showed  his white tongue (maybe to say he was thirsty?). Sitter shared that Pt has a procedure at 2pm and not allowed to eat or drink right now. Chaplains will remain available for continued care and support.   Please contact Spiritual Care for any emotional and spiritual needs and/or referrals. Thank you.

## 2024-06-21 NOTE — CARE COORDINATION
TRANSFER - OUT REPORT:    Verbal report given to Melany Kwan (name) on Ayden Simpson (patient name)  being transferred to [facility] (unit) for progression of care.  Report consisted of patient’s Situation, Background, Assessment and   Recommendations(SBAR).    1. Pt is from [facility]  2. Call Mitzi Blevins, caregiver/manager at [phone].  She stated they can accept him on the weekend and will transport him.  3.  Pt now on RA & has completed course of abx  4.  Pt is nonverbal  5.  He is in endo getting PEG changed to J-tube  6.  Currently on TPN.  Plan to start tube feeds tomorrow.  If he tolerates them he may be d/c'd    BOBBY John  6/21/2024  2:38 PM

## 2024-06-21 NOTE — H&P
.Pre-Endoscopy H&P Update  Chief complaint/HPI/ROS:  The indication for the procedure, the patient's history and the patient's current medications are reviewed prior to the procedure and that data is reported on the H&P to which this document is attached.  Any significant complaints with regard to organ systems will be noted, and if not mentioned then a review of systems is not contributory.  Past Medical History:   Diagnosis Date    Anxiety     Cerebral palsy (HCC)     Contracted, joint, multiple sites     upper extremities due to cerebral palsy    Developmental non-verbal disorder     Encephalopathy chronic     GERD (gastroesophageal reflux disease)     Hip dysplasia     Ill-defined condition     cerebral palsy    Intellectual disability     Mass of bladder     Microcephaly (HCC)     Muscle spasm     upper and lower extremities      Past Surgical History:   Procedure Laterality Date    IA UNLISTED PROCEDURE ABDOMEN PERITONEUM & OMENTUM      peg     Social   Social History     Tobacco Use    Smoking status: Never    Smokeless tobacco: Never   Substance Use Topics    Alcohol use: Never      History reviewed. No pertinent family history.   No Known Allergies   Prior to Admission Medications   Prescriptions Last Dose Informant Patient Reported? Taking?   LORazepam (ATIVAN) 2 MG/ML concentrated solution   Yes Yes   Si mL by Per G Tube route 2 times daily. Max Daily Amount: 4 mg   OMEPRAZOLE PO   Yes No   Si mg by Enteral route daily   azelastine (ASTELIN) 0.1 % nasal spray   No No   Si sprays by Nasal route 2 times daily Use in each nostril as directed   baclofen (LIORESAL) 10 MG tablet   No No   Sig: TAKE ONE TABLET 3 TIMES A DAY CRUSHED PER PEG TUBE FOR MUSCLE SPASMS   ferrous sulfate 220 (44 Fe) MG/5ML solution   Yes No   glycopyrrolate (CUVPOSA) 1 MG/5ML SOLN solution   No No   Sig: GIVE 5ML (1MG) VIA PEG TUBE EVERY DAY FOR EXCESSIVE SALIVA ** REORDER WHEN LOW **   ipratropium (ATROVENT) 0.03 % nasal  spray   Yes No   Si sprays by Nasal route 2 times daily   loratadine (CLARITIN) 10 MG tablet   No No   Sig: Take 1 tablet by mouth daily   polyethylene glycol (GLYCOLAX) 17 GM/SCOOP powder   Yes No   Sig: Take 8 g by mouth daily   risperiDONE (RISPERDAL) 1 MG tablet   Yes No   Si tablet by Enteral route 3 times daily   risperiDONE (RISPERDAL) 1 MG/ML oral solution   No No   Sig: GIVE 1ML BY G-TUBE 3 TIMES A DAY FOR ANXIETY, LASHING OUT, KICKING, THROWING SELF OUT OF WHEELCHAIR AGGRESSIVELY, OR YELLING.   sucralfate (CARAFATE) 1 GM/10ML suspension   No No   Sig: TAKE 5 MLS PER PEG TUBE TWICE DAILY FOR GERD   vitamin D (CHOLECALCIFEROL) 50 MCG ( UT) TABS tablet   No No   Sig: TAKE ONE TABLET CRUSHED BY PEG TUBE DAILY FOR SUPPLEMENT      Facility-Administered Medications: None       PHYSICAL EXAM:  The patient is examined immediately prior to the procedure.  Vitals:    24 1350   BP: 128/72   Pulse: 81   Resp: 16   Temp: 98.2 °F (36.8 °C)   SpO2: 96%     Gen: Appears comfortable, no distress.  Pulm: comfortable respirations with no abnormal audible breath sounds  HEART: well perfused, no abnormal audible heart sounds  GI: abdomen flat.    PLAN:  Informed consent discussion held, patient afforded an opportunity to ask questions and all questions answered.  After being advised of the risks, benefits, and alternatives, the patient requested that we proceed and indicated so on a written consent form.      Will proceed with procedure as planned.  Dusty Davis MD

## 2024-06-21 NOTE — PERIOP NOTE
Consents for procedure obtained from caregiver on 6/19/24. Procedure not able to be performed on 6/19/24 secondary to patient having received Lovenox that morning. Per Risk Management, it is okay to use signed consents from 6/19/24 for procedure today.

## 2024-06-21 NOTE — PROGRESS NOTES
Comprehensive Nutrition Assessment    Type and Reason for Visit: Reassess    Nutrition Recommendations/Plan:   Continue PPN today at goal:    PPN: 1512 mL, 151 g carbs, 64 g protein (D10, AA4.25% @ 63 mL/hour)    If TF not tolerated, continue Lipids: 250 mL of 20% Lipids 2x/week (Wed & Mon)     Once PEG-J placed, cleared for use, recommend reducing PPN to half rate and stop PPN completely after patient shows 24 hours of TF tolerance.     Begin J-feedings as follows:   Formula: Zeis Excelsa Peptide 1.5 Plain:   Begin J-tube feeding at 20 mL (4pm until 10am, off for 6 hrs daily)  Continue trickle rate until J-tube placement is reconfirmed per GI instructions.  Once confirmed, begin increasing rate by 10 mL Q 6 hours to goal of 50 mL/hr   GOAL TF: 50 mL x 18 hours daily (total volume goal = ~875 mL/d)       6. Free Water Flush 60 mL Q2 hours around the clock or total volume ~720 mL/day (some may be given via G-tube as appropriate/needed)    7. Resume/Begin daily children's multi-vitamin via G-tube to meet micronutrient needs     8. Keep HOB > 30 degrees at all times unless otherwise directed by a physician.         PPN at goal rate 63 mL/hour with lipids 2x/week provides on average: 913 kcal (83% needs); 64 g protein (100% needs)    Tube Feeding GOAL: Zeis Excelsa Peptide 1.5 Plain 875 mL/day + 720 ml free water daily   provides: 1312 kcal, 65 g protein, 120 g carbohydrate, 67 g fat, 13 g fiber, 615 mL of free water + 720 flushes = ~1335 mL of free water daily.     Malnutrition Assessment:  Malnutrition Status:  Moderate malnutrition (06/21/24 1554)    Context:  Acute Illness     Findings of the 6 clinical characteristics of malnutrition:  Energy Intake:  75% or less of estimated energy requirements for 7 or more days  Weight Loss:  Greater than 2% over 1 week     Body Fat Loss:  Unable to assess (due to CP)     Muscle Mass Loss:  Unable to assess (due to CP)    Fluid Accumulation:  No significant fluid accumulation

## 2024-06-21 NOTE — PROGRESS NOTES
TRANSFER - IN REPORT:    Verbal report received from BOBBY Rodriguez on Ayden Simpson  being received from 325 for ordered procedure      Report consisted of patient's Situation, Background, Assessment and   Recommendations(SBAR).     Information from the following report(s) Nurse Handoff Report was reviewed with the receiving nurse.    Opportunity for questions and clarification was provided.      Assessment completed upon patient's arrival to unit and care assumed.

## 2024-06-21 NOTE — PROGRESS NOTES
Ayden Calvin  1965  [id number]    Situation:  Verbal report received from: BOBBY Wright   Procedure: Procedure(s):  ESOPHAGOGASTRODUODENOSCOPY  ESOPHAGOGASTRODUODENOSCOPY JEJUNOSTOMY TUBE PLACEMENT    Background:    Preoperative diagnosis: Aspiration into airway, initial encounter [T17.907T]  Postoperative diagnosis: * No post-op diagnosis entered *    :  Dr. Davis   Assistant(s): Circulator: Fay Brar RN  Endoscopy Technician: Robert Bruce    Specimens: * No specimens in log *  H. Pylori    no    Assessment:  Intra-procedure medications     Anesthesia gave intra-procedure sedation and medications, see anesthesia flow sheet   yes    Intravenous fluids: NS@ KVO     Vital signs stable   yes    Abdominal assessment: round and soft   yes    Recommendation:  Discharge patient per MD order  inpatient.  Return to floor  room 429  Family or Friend   caregiver   Permission to share finding with family or friend   yes

## 2024-06-21 NOTE — PERIOP NOTE
Dr. Davis at bedside to evaluate feeding tube. Per Dr. Davis patient will need x-ray at 6pm today to evaluate tube. Patient is okay to receive meds through peg portion of tube (clear port) per Dr. Davis. Feeding to go through j-tube extension (blue port) once x-ray is read. Feedings to be held until after 6pm x-ray is read by MD.

## 2024-06-21 NOTE — PROGRESS NOTES
[facility]  [address] [place] [address] [phone]      Hospitalist Progress Note      NAME: Ayden Simpson   :  1965  MRM:  [id number]    Date of service: 2024  11:31 AM       Assessment and Plan:   Acute respiratory failure. Secondary to pneumonia with concern for aspiration. Completed IV antibiotics. Continue scheduled nebulizers.  Finished IV steroid.  Evaluated by pulm     2.  Concern for aspiration/reflux/vomiting/GERD. Tube feeds on hold for now pending PEG exchange for J tube on .  PPN started.  Continue PPI, antiemetics.  Evaluated by GI and general surgery     3.  Sepsis POA.  Resolved.  Due to pneumonia     4.  Pulmonary edema on chest x-ray.  Check echocardiogram     5.  Elevated LFTs.  Improved.  Possible related to antibiotics.  Ultrasound of the liver shows small gallstone otherwise unremarkable.  Hepatitis panel is negative.  Monitor     6.  Electrolyte abnormalities. Likely due to poor nutrition.  Replete as needed.  Evaluated by nutrition     7.  Cerebral palsy/ mood disorder. Continue home baclofen, risperidone, Remeron and ativan                  Subjective:     Chief Complaint:: Patient was seen and examined as a follow up for acute hypoxic respiratory failure.  Chart was reviewed.  Nonverbal.  No event    ROS:  (bold if positive, if negative)    Tolerating PT  Tolerating Diet        Objective:     Last 24hrs VS reviewed since prior progress note. Most recent are:    Vitals:    24 0845   BP: 131/80   Pulse:    Resp:    Temp:    SpO2:      SpO2 Readings from Last 6 Encounters:   24 96%          Intake/Output Summary (Last 24 hours) at 2024 1131  Last data filed at 2024 0321  Gross per 24 hour   Intake --   Output 250 ml   Net -250 ml        Physical Exam:    Gen:  Well-developed, well-nourished, in no acute distress  HEENT:  Pink conjunctivae, PERRL, hearing intact to voice, moist mucous membranes  Neck:   medications in the medical record.  I have personally examined and treated the patient at bedside during this period.                 Care Plan discussed with: Care Manager, Nursing Staff, and >50% of time spent in counseling and coordination of care    Discussed:  Care Plan    Prophylaxis:  Lovenox    Disposition:   Group home           ___________________________________________________    Attending Physician: Chon Winn MD

## 2024-06-22 LAB
ALBUMIN SERPL-MCNC: 4.3 G/DL (ref 3.5–5)
ALBUMIN/GLOB SERPL: 2 (ref 1.1–2.2)
ALP SERPL-CCNC: 69 U/L (ref 45–117)
ALT SERPL-CCNC: 103 U/L (ref 12–78)
ANION GAP SERPL CALC-SCNC: 6 MMOL/L (ref 5–15)
AST SERPL-CCNC: 42 U/L (ref 15–37)
BASOPHILS # BLD: 0 K/UL (ref 0–0.1)
BASOPHILS NFR BLD: 0 % (ref 0–1)
BILIRUB SERPL-MCNC: 1.6 MG/DL (ref 0.2–1)
BUN SERPL-MCNC: 26 MG/DL (ref 6–20)
BUN/CREAT SERPL: 42 (ref 12–20)
CALCIUM SERPL-MCNC: 8.5 MG/DL (ref 8.5–10.1)
CHLORIDE SERPL-SCNC: 109 MMOL/L (ref 97–108)
CO2 SERPL-SCNC: 24 MMOL/L (ref 21–32)
CREAT SERPL-MCNC: 0.62 MG/DL (ref 0.7–1.3)
DIFFERENTIAL METHOD BLD: ABNORMAL
EOSINOPHIL # BLD: 0.2 K/UL (ref 0–0.4)
EOSINOPHIL NFR BLD: 2 % (ref 0–7)
ERYTHROCYTE [DISTWIDTH] IN BLOOD BY AUTOMATED COUNT: 12.4 % (ref 11.5–14.5)
GLOBULIN SER CALC-MCNC: 2.1 G/DL (ref 2–4)
GLUCOSE BLD STRIP.AUTO-MCNC: 78 MG/DL (ref 65–117)
GLUCOSE SERPL-MCNC: 113 MG/DL (ref 65–100)
HCT VFR BLD AUTO: 43.8 % (ref 36.6–50.3)
HGB BLD-MCNC: 15.1 G/DL (ref 12.1–17)
IMM GRANULOCYTES # BLD AUTO: 0.1 K/UL (ref 0–0.04)
IMM GRANULOCYTES NFR BLD AUTO: 1 % (ref 0–0.5)
LYMPHOCYTES # BLD: 0.9 K/UL (ref 0.8–3.5)
LYMPHOCYTES NFR BLD: 8 % (ref 12–49)
MAGNESIUM SERPL-MCNC: 2.3 MG/DL (ref 1.6–2.4)
MCH RBC QN AUTO: 31.9 PG (ref 26–34)
MCHC RBC AUTO-ENTMCNC: 34.5 G/DL (ref 30–36.5)
MCV RBC AUTO: 92.6 FL (ref 80–99)
MONOCYTES # BLD: 0.7 K/UL (ref 0–1)
MONOCYTES NFR BLD: 7 % (ref 5–13)
NEUTS SEG # BLD: 8.7 K/UL (ref 1.8–8)
NEUTS SEG NFR BLD: 82 % (ref 32–75)
NRBC # BLD: 0 K/UL (ref 0–0.01)
NRBC BLD-RTO: 0 PER 100 WBC
PHOSPHATE SERPL-MCNC: 3.3 MG/DL (ref 2.6–4.7)
PLATELET # BLD AUTO: 194 K/UL (ref 150–400)
PMV BLD AUTO: 10 FL (ref 8.9–12.9)
POTASSIUM SERPL-SCNC: 3.1 MMOL/L (ref 3.5–5.1)
PROT SERPL-MCNC: 6.4 G/DL (ref 6.4–8.2)
RBC # BLD AUTO: 4.73 M/UL (ref 4.1–5.7)
SERVICE CMNT-IMP: NORMAL
SODIUM SERPL-SCNC: 139 MMOL/L (ref 136–145)
WBC # BLD AUTO: 10.6 K/UL (ref 4.1–11.1)

## 2024-06-22 PROCEDURE — 6370000000 HC RX 637 (ALT 250 FOR IP): Performed by: HOSPITALIST

## 2024-06-22 PROCEDURE — 6370000000 HC RX 637 (ALT 250 FOR IP): Performed by: INTERNAL MEDICINE

## 2024-06-22 PROCEDURE — 80053 COMPREHEN METABOLIC PANEL: CPT

## 2024-06-22 PROCEDURE — 2580000003 HC RX 258: Performed by: HOSPITALIST

## 2024-06-22 PROCEDURE — 6370000000 HC RX 637 (ALT 250 FOR IP)

## 2024-06-22 PROCEDURE — 83735 ASSAY OF MAGNESIUM: CPT

## 2024-06-22 PROCEDURE — C9113 INJ PANTOPRAZOLE SODIUM, VIA: HCPCS | Performed by: INTERNAL MEDICINE

## 2024-06-22 PROCEDURE — 84100 ASSAY OF PHOSPHORUS: CPT

## 2024-06-22 PROCEDURE — 2500000003 HC RX 250 WO HCPCS: Performed by: INTERNAL MEDICINE

## 2024-06-22 PROCEDURE — 82962 GLUCOSE BLOOD TEST: CPT

## 2024-06-22 PROCEDURE — 1100000000 HC RM PRIVATE

## 2024-06-22 PROCEDURE — 36415 COLL VENOUS BLD VENIPUNCTURE: CPT

## 2024-06-22 PROCEDURE — 2580000003 HC RX 258: Performed by: INTERNAL MEDICINE

## 2024-06-22 PROCEDURE — 94668 MNPJ CHEST WALL SBSQ: CPT

## 2024-06-22 PROCEDURE — 94640 AIRWAY INHALATION TREATMENT: CPT

## 2024-06-22 PROCEDURE — 85025 COMPLETE CBC W/AUTO DIFF WBC: CPT

## 2024-06-22 PROCEDURE — 6360000002 HC RX W HCPCS: Performed by: INTERNAL MEDICINE

## 2024-06-22 RX ORDER — 0.9 % SODIUM CHLORIDE 0.9 %
500 INTRAVENOUS SOLUTION INTRAVENOUS ONCE
Status: DISCONTINUED | OUTPATIENT
Start: 2024-06-22 | End: 2024-06-22

## 2024-06-22 RX ORDER — POTASSIUM CHLORIDE 7.45 MG/ML
10 INJECTION INTRAVENOUS
Status: DISPENSED | OUTPATIENT
Start: 2024-06-22 | End: 2024-06-22

## 2024-06-22 RX ADMIN — RISPERIDONE 1 MG: 1 SOLUTION ORAL at 14:51

## 2024-06-22 RX ADMIN — GLYCOPYRROLATE 1 MG: 1 TABLET ORAL at 14:51

## 2024-06-22 RX ADMIN — SODIUM CHLORIDE, PRESERVATIVE FREE 10 ML: 5 INJECTION INTRAVENOUS at 09:21

## 2024-06-22 RX ADMIN — BACLOFEN 10 MG: 10 TABLET ORAL at 14:51

## 2024-06-22 RX ADMIN — MIRTAZAPINE 15 MG: 15 TABLET, FILM COATED ORAL at 21:12

## 2024-06-22 RX ADMIN — IPRATROPIUM BROMIDE 2 SPRAY: 21 SPRAY NASAL at 09:20

## 2024-06-22 RX ADMIN — GLYCOPYRROLATE 1 MG: 1 TABLET ORAL at 21:12

## 2024-06-22 RX ADMIN — IPRATROPIUM BROMIDE AND ALBUTEROL SULFATE 1 DOSE: .5; 3 SOLUTION RESPIRATORY (INHALATION) at 13:29

## 2024-06-22 RX ADMIN — Medication 1 CAPSULE: at 09:17

## 2024-06-22 RX ADMIN — IPRATROPIUM BROMIDE AND ALBUTEROL SULFATE 1 DOSE: .5; 3 SOLUTION RESPIRATORY (INHALATION) at 20:49

## 2024-06-22 RX ADMIN — RISPERIDONE 1 MG: 1 SOLUTION ORAL at 21:11

## 2024-06-22 RX ADMIN — SODIUM CHLORIDE, PRESERVATIVE FREE 10 ML: 5 INJECTION INTRAVENOUS at 21:10

## 2024-06-22 RX ADMIN — GLYCOPYRROLATE 1 MG: 1 TABLET ORAL at 09:19

## 2024-06-22 RX ADMIN — SODIUM CHLORIDE, PRESERVATIVE FREE 40 MG: 5 INJECTION INTRAVENOUS at 09:18

## 2024-06-22 RX ADMIN — POTASSIUM BICARBONATE 40 MEQ: 782 TABLET, EFFERVESCENT ORAL at 11:34

## 2024-06-22 RX ADMIN — LORAZEPAM 2 MG: 1 TABLET ORAL at 21:11

## 2024-06-22 RX ADMIN — IPRATROPIUM BROMIDE AND ALBUTEROL SULFATE 1 DOSE: .5; 3 SOLUTION RESPIRATORY (INHALATION) at 07:48

## 2024-06-22 RX ADMIN — CETIRIZINE HYDROCHLORIDE 5 MG: 10 TABLET, FILM COATED ORAL at 09:17

## 2024-06-22 RX ADMIN — Medication 300 MG: at 09:18

## 2024-06-22 RX ADMIN — AZELASTINE HYDROCHLORIDE 2 SPRAY: 137 SPRAY, METERED NASAL at 21:12

## 2024-06-22 RX ADMIN — GUAIFENESIN 400 MG: 200 SOLUTION ORAL at 21:10

## 2024-06-22 RX ADMIN — IPRATROPIUM BROMIDE 2 SPRAY: 21 SPRAY NASAL at 00:41

## 2024-06-22 RX ADMIN — GUAIFENESIN 400 MG: 200 SOLUTION ORAL at 09:18

## 2024-06-22 RX ADMIN — IPRATROPIUM BROMIDE 2 SPRAY: 21 SPRAY NASAL at 21:12

## 2024-06-22 RX ADMIN — AZELASTINE HYDROCHLORIDE 2 SPRAY: 137 SPRAY, METERED NASAL at 09:18

## 2024-06-22 RX ADMIN — SODIUM CHLORIDE, PRESERVATIVE FREE 40 MG: 5 INJECTION INTRAVENOUS at 21:11

## 2024-06-22 RX ADMIN — LORAZEPAM 2 MG: 1 TABLET ORAL at 09:20

## 2024-06-22 RX ADMIN — AZELASTINE HYDROCHLORIDE 2 SPRAY: 137 SPRAY, METERED NASAL at 00:42

## 2024-06-22 RX ADMIN — BACLOFEN 10 MG: 10 TABLET ORAL at 09:18

## 2024-06-22 RX ADMIN — RISPERIDONE 1 MG: 1 SOLUTION ORAL at 09:41

## 2024-06-22 RX ADMIN — BACLOFEN 10 MG: 10 TABLET ORAL at 21:11

## 2024-06-22 RX ADMIN — RISPERIDONE 1 MG: 1 SOLUTION ORAL at 00:41

## 2024-06-22 ASSESSMENT — PAIN SCALES - WONG BAKER: WONGBAKER_NUMERICALRESPONSE: NO HURT

## 2024-06-22 NOTE — PROGRESS NOTES
[facility]  [address] [place] [address] [phone]      Hospitalist Progress Note      NAME: Ayden Simpson   :  1965  MRM:  [id number]    Date of service: 2024  9:35 AM       Assessment and Plan:   Acute respiratory failure. Secondary to pneumonia with concern for aspiration. Completed IV antibiotics. Continue scheduled nebulizers.  Finished IV steroid.  Improved.  Off of oxygen.  Evaluated by pulm     2.  Concern for aspiration/reflux/vomiting/GERD. PEG exchange for J tube on .  Started on tube feeding per nutrition recommendation.  Wean PPN.  Continue PPI, antiemetics.  Watch for refeeding syndrome.  Check electrolytes.  Evaluated by GI and general surgery     3.  Sepsis POA.  Resolved.  Due to pneumonia     4.  Pulmonary edema on chest x-ray.  Normal EF and wall motion.  Noted to have PFO. Severe right to left shunt was noted. Atrial septal aneurysm present.  Will discuss with cardiology     5.  Elevated LFTs.  Improved.  Possible related to antibiotics.  Ultrasound of the liver shows small gallstone otherwise unremarkable.  Hepatitis panel is negative.  Monitor     6.  Electrolyte abnormalities. Likely due to poor nutrition.  Replete as needed.  Evaluated by nutrition     7.  Cerebral palsy/ mood disorder. Continue home baclofen, risperidone, Remeron and ativan                  Subjective:     Chief Complaint:: Patient was seen and examined as a follow up for acute hypoxic respiratory failure.  Chart was reviewed.  Nonverbal.  Had diarrhea after starting tube feeding    ROS:  (bold if positive, if negative)    Tolerating PT  Tolerating Diet        Objective:     Last 24hrs VS reviewed since prior progress note. Most recent are:    Vitals:    24 0755   BP: 126/82   Pulse: 80   Resp: 15   Temp: 98.2 °F (36.8 °C)   SpO2: 96%     SpO2 Readings from Last 6 Encounters:   24 96%          Intake/Output Summary (Last 24 hours) at 2024

## 2024-06-22 NOTE — PLAN OF CARE
Problem: Discharge Planning  Goal: Discharge to home or other facility with appropriate resources  6/22/2024 0223 by Jojo De La O RN  Outcome: [facility] Progressing  6/21/2024 1732 by Garrett Warren RN  Outcome: Progressing     Problem: Safety - Adult  Goal: Free from fall injury  6/22/2024 0223 by Jojo De La O RN  Outcome: [facility] Progressing  6/21/2024 1732 by Garrett Warren RN  Outcome: Progressing  Flowsheets (Taken 6/21/2024 1343 by Fay Brar RN)  Free From Fall Injury:   Instruct family/caregiver on patient safety   Based on caregiver fall risk screen, instruct family/caregiver to ask for assistance with transferring infant if caregiver noted to have fall risk factors     Problem: Nutrition Deficit:  Goal: Optimize nutritional status  6/22/2024 0223 by Jojo De La O RN  Outcome: /[facility] Progressing  6/21/2024 1732 by Garrett Warren RN  Outcome: Progressing  Flowsheets (Taken 6/21/2024 1548 by Kell Lu, JONATHAN)  Nutrient intake appropriate for improving, restoring, or maintaining nutritional needs: Recommend, monitor, and adjust tube feedings and TPN/PPN based on assessed needs     Problem: Pain  Goal: Verbalizes/displays adequate comfort level or baseline comfort level  6/22/2024 0223 by Jojo De La O RN  Outcome: /[facility] Progressing  6/21/2024 1732 by Garrett Warren RN  Outcome: Progressing     Problem: Skin/Tissue Integrity  Goal: Absence of new skin breakdown  Description: 1.  Monitor for areas of redness and/or skin breakdown  2.  Assess vascular access sites hourly  3.  Every 4-6 hours minimum:  Change oxygen saturation probe site  4.  Every 4-6 hours:  If on nasal continuous positive airway pressure, respiratory therapy assess nares and determine need for appliance change or resting period.  6/22/2024 0223 by Jojo De La O RN  Outcome: /[facility] Progressing  6/21/2024 1732 by Garrett Warren RN  Outcome: Progressing     Problem: Respiratory - Adult  Goal: Achieves

## 2024-06-22 NOTE — PLAN OF CARE
Problem: Respiratory - Adult  Goal: Achieves optimal ventilation and oxygenation  6/22/2024 1047 by Masha Jolly, RN  Outcome: Progressing  Flowsheets (Taken 6/22/2024 1047)  Achieves optimal ventilation and oxygenation:   Assess for changes in respiratory status   Assess for changes in mentation and behavior   Position to facilitate oxygenation and minimize respiratory effort   Assess the need for suctioning and aspirate as needed  6/22/2024 0752 by Janett Laird, RT  Outcome: Progressing  6/22/2024 0223 by Jojo De La O, RN  Outcome: /[facility] Progressing

## 2024-06-22 NOTE — PROGRESS NOTES
Gastroenterology attending note in brief    Patient currently tolerating tube feeds, post PEG/J-tube exchange chest x-ray confirming J-tube extension located in the D2/D3 and postpyloric presenting a low risk for aspiration for ongoing tube feed administration.  At the present time as long as patient is tolerating tube feeds I would recommend use of the J-port for feeds, use of the G port for medications and no indication to repeat the upper endoscopy.  I suspect that the J extension will continue to propulse through the small bowel short distance more; of note there was no looping of the J extension of the stomach noted on my review of the chest x-ray which is reassuring.

## 2024-06-23 LAB
ALBUMIN SERPL-MCNC: 4.2 G/DL (ref 3.5–5)
ALBUMIN/GLOB SERPL: 1.6 (ref 1.1–2.2)
ALP SERPL-CCNC: 89 U/L (ref 45–117)
ALT SERPL-CCNC: 108 U/L (ref 12–78)
ANION GAP SERPL CALC-SCNC: 9 MMOL/L (ref 5–15)
AST SERPL-CCNC: 35 U/L (ref 15–37)
BASOPHILS # BLD: 0 K/UL (ref 0–0.1)
BASOPHILS NFR BLD: 0 % (ref 0–1)
BILIRUB SERPL-MCNC: 1.6 MG/DL (ref 0.2–1)
BUN SERPL-MCNC: 23 MG/DL (ref 6–20)
BUN/CREAT SERPL: 32 (ref 12–20)
CALCIUM SERPL-MCNC: 8.6 MG/DL (ref 8.5–10.1)
CHLORIDE SERPL-SCNC: 105 MMOL/L (ref 97–108)
CO2 SERPL-SCNC: 23 MMOL/L (ref 21–32)
COMMENT:: NORMAL
CREAT SERPL-MCNC: 0.71 MG/DL (ref 0.7–1.3)
DIFFERENTIAL METHOD BLD: ABNORMAL
EOSINOPHIL # BLD: 0.4 K/UL (ref 0–0.4)
EOSINOPHIL NFR BLD: 3 % (ref 0–7)
ERYTHROCYTE [DISTWIDTH] IN BLOOD BY AUTOMATED COUNT: 12.4 % (ref 11.5–14.5)
GLOBULIN SER CALC-MCNC: 2.7 G/DL (ref 2–4)
GLUCOSE BLD STRIP.AUTO-MCNC: 82 MG/DL (ref 65–117)
GLUCOSE BLD STRIP.AUTO-MCNC: 90 MG/DL (ref 65–117)
GLUCOSE SERPL-MCNC: 103 MG/DL (ref 65–100)
HCT VFR BLD AUTO: 46.1 % (ref 36.6–50.3)
HGB BLD-MCNC: 15.9 G/DL (ref 12.1–17)
IMM GRANULOCYTES # BLD AUTO: 0.1 K/UL (ref 0–0.04)
IMM GRANULOCYTES NFR BLD AUTO: 1 % (ref 0–0.5)
LYMPHOCYTES # BLD: 1.2 K/UL (ref 0.8–3.5)
LYMPHOCYTES NFR BLD: 10 % (ref 12–49)
MAGNESIUM SERPL-MCNC: 2.3 MG/DL (ref 1.6–2.4)
MCH RBC QN AUTO: 32.1 PG (ref 26–34)
MCHC RBC AUTO-ENTMCNC: 34.5 G/DL (ref 30–36.5)
MCV RBC AUTO: 92.9 FL (ref 80–99)
MONOCYTES # BLD: 0.9 K/UL (ref 0–1)
MONOCYTES NFR BLD: 8 % (ref 5–13)
NEUTS SEG # BLD: 9.4 K/UL (ref 1.8–8)
NEUTS SEG NFR BLD: 78 % (ref 32–75)
NRBC # BLD: 0 K/UL (ref 0–0.01)
NRBC BLD-RTO: 0 PER 100 WBC
PHOSPHATE SERPL-MCNC: 3 MG/DL (ref 2.6–4.7)
PLATELET # BLD AUTO: 188 K/UL (ref 150–400)
PMV BLD AUTO: 10.1 FL (ref 8.9–12.9)
POTASSIUM SERPL-SCNC: 3.3 MMOL/L (ref 3.5–5.1)
PROT SERPL-MCNC: 6.9 G/DL (ref 6.4–8.2)
RBC # BLD AUTO: 4.96 M/UL (ref 4.1–5.7)
SERVICE CMNT-IMP: NORMAL
SERVICE CMNT-IMP: NORMAL
SODIUM SERPL-SCNC: 137 MMOL/L (ref 136–145)
SPECIMEN HOLD: NORMAL
WBC # BLD AUTO: 12.1 K/UL (ref 4.1–11.1)

## 2024-06-23 PROCEDURE — 93005 ELECTROCARDIOGRAM TRACING: CPT | Performed by: INTERNAL MEDICINE

## 2024-06-23 PROCEDURE — 80053 COMPREHEN METABOLIC PANEL: CPT

## 2024-06-23 PROCEDURE — 2580000003 HC RX 258: Performed by: INTERNAL MEDICINE

## 2024-06-23 PROCEDURE — 6370000000 HC RX 637 (ALT 250 FOR IP)

## 2024-06-23 PROCEDURE — 1100000000 HC RM PRIVATE

## 2024-06-23 PROCEDURE — 6360000002 HC RX W HCPCS

## 2024-06-23 PROCEDURE — 6370000000 HC RX 637 (ALT 250 FOR IP): Performed by: INTERNAL MEDICINE

## 2024-06-23 PROCEDURE — 94640 AIRWAY INHALATION TREATMENT: CPT

## 2024-06-23 PROCEDURE — 6370000000 HC RX 637 (ALT 250 FOR IP): Performed by: HOSPITALIST

## 2024-06-23 PROCEDURE — 84100 ASSAY OF PHOSPHORUS: CPT

## 2024-06-23 PROCEDURE — 2500000003 HC RX 250 WO HCPCS: Performed by: INTERNAL MEDICINE

## 2024-06-23 PROCEDURE — 94761 N-INVAS EAR/PLS OXIMETRY MLT: CPT

## 2024-06-23 PROCEDURE — 82962 GLUCOSE BLOOD TEST: CPT

## 2024-06-23 PROCEDURE — 6360000002 HC RX W HCPCS: Performed by: INTERNAL MEDICINE

## 2024-06-23 PROCEDURE — 2580000003 HC RX 258: Performed by: NURSE PRACTITIONER

## 2024-06-23 PROCEDURE — 2580000003 HC RX 258

## 2024-06-23 PROCEDURE — 83735 ASSAY OF MAGNESIUM: CPT

## 2024-06-23 PROCEDURE — 85025 COMPLETE CBC W/AUTO DIFF WBC: CPT

## 2024-06-23 PROCEDURE — 94669 MECHANICAL CHEST WALL OSCILL: CPT

## 2024-06-23 PROCEDURE — C9113 INJ PANTOPRAZOLE SODIUM, VIA: HCPCS | Performed by: INTERNAL MEDICINE

## 2024-06-23 PROCEDURE — 2580000003 HC RX 258: Performed by: HOSPITALIST

## 2024-06-23 RX ORDER — SODIUM CHLORIDE 9 MG/ML
INJECTION, SOLUTION INTRAVENOUS CONTINUOUS
Status: DISCONTINUED | OUTPATIENT
Start: 2024-06-23 | End: 2024-06-25

## 2024-06-23 RX ORDER — 0.9 % SODIUM CHLORIDE 0.9 %
500 INTRAVENOUS SOLUTION INTRAVENOUS ONCE
Status: COMPLETED | OUTPATIENT
Start: 2024-06-23 | End: 2024-06-23

## 2024-06-23 RX ORDER — POTASSIUM CHLORIDE 7.45 MG/ML
10 INJECTION INTRAVENOUS
Status: COMPLETED | OUTPATIENT
Start: 2024-06-23 | End: 2024-06-23

## 2024-06-23 RX ORDER — 0.9 % SODIUM CHLORIDE 0.9 %
500 INTRAVENOUS SOLUTION INTRAVENOUS ONCE
Status: DISCONTINUED | OUTPATIENT
Start: 2024-06-23 | End: 2024-06-23

## 2024-06-23 RX ORDER — 0.9 % SODIUM CHLORIDE 0.9 %
500 INTRAVENOUS SOLUTION INTRAVENOUS ONCE
Status: COMPLETED | OUTPATIENT
Start: 2024-06-24 | End: 2024-06-24

## 2024-06-23 RX ADMIN — GUAIFENESIN 400 MG: 200 SOLUTION ORAL at 08:45

## 2024-06-23 RX ADMIN — IPRATROPIUM BROMIDE 2 SPRAY: 21 SPRAY NASAL at 21:52

## 2024-06-23 RX ADMIN — IPRATROPIUM BROMIDE 2 SPRAY: 21 SPRAY NASAL at 08:47

## 2024-06-23 RX ADMIN — AZELASTINE HYDROCHLORIDE 2 SPRAY: 137 SPRAY, METERED NASAL at 21:52

## 2024-06-23 RX ADMIN — SODIUM CHLORIDE, PRESERVATIVE FREE 40 MG: 5 INJECTION INTRAVENOUS at 21:51

## 2024-06-23 RX ADMIN — IPRATROPIUM BROMIDE AND ALBUTEROL SULFATE 1 DOSE: .5; 3 SOLUTION RESPIRATORY (INHALATION) at 20:51

## 2024-06-23 RX ADMIN — SODIUM CHLORIDE 500 ML: 9 INJECTION, SOLUTION INTRAVENOUS at 23:37

## 2024-06-23 RX ADMIN — SODIUM CHLORIDE, PRESERVATIVE FREE 10 ML: 5 INJECTION INTRAVENOUS at 08:46

## 2024-06-23 RX ADMIN — LORAZEPAM 2 MG: 1 TABLET ORAL at 21:52

## 2024-06-23 RX ADMIN — RISPERIDONE 1 MG: 1 SOLUTION ORAL at 22:37

## 2024-06-23 RX ADMIN — MIRTAZAPINE 15 MG: 15 TABLET, FILM COATED ORAL at 21:52

## 2024-06-23 RX ADMIN — BACLOFEN 10 MG: 10 TABLET ORAL at 21:52

## 2024-06-23 RX ADMIN — SODIUM CHLORIDE, PRESERVATIVE FREE 40 MG: 5 INJECTION INTRAVENOUS at 08:45

## 2024-06-23 RX ADMIN — GLYCOPYRROLATE 1 MG: 1 TABLET ORAL at 16:51

## 2024-06-23 RX ADMIN — GLYCOPYRROLATE 1 MG: 1 TABLET ORAL at 08:54

## 2024-06-23 RX ADMIN — IPRATROPIUM BROMIDE AND ALBUTEROL SULFATE 1 DOSE: .5; 3 SOLUTION RESPIRATORY (INHALATION) at 07:37

## 2024-06-23 RX ADMIN — Medication 1 CAPSULE: at 08:45

## 2024-06-23 RX ADMIN — POTASSIUM CHLORIDE 10 MEQ: 7.45 INJECTION INTRAVENOUS at 08:43

## 2024-06-23 RX ADMIN — IPRATROPIUM BROMIDE AND ALBUTEROL SULFATE 1 DOSE: .5; 3 SOLUTION RESPIRATORY (INHALATION) at 13:48

## 2024-06-23 RX ADMIN — SODIUM CHLORIDE: 9 INJECTION, SOLUTION INTRAVENOUS at 09:03

## 2024-06-23 RX ADMIN — POTASSIUM CHLORIDE 10 MEQ: 7.45 INJECTION INTRAVENOUS at 06:07

## 2024-06-23 RX ADMIN — GUAIFENESIN 400 MG: 200 SOLUTION ORAL at 21:51

## 2024-06-23 RX ADMIN — LORAZEPAM 2 MG: 1 TABLET ORAL at 08:46

## 2024-06-23 RX ADMIN — BACLOFEN 10 MG: 10 TABLET ORAL at 16:51

## 2024-06-23 RX ADMIN — RISPERIDONE 1 MG: 1 SOLUTION ORAL at 08:55

## 2024-06-23 RX ADMIN — RISPERIDONE 1 MG: 1 SOLUTION ORAL at 14:15

## 2024-06-23 RX ADMIN — POTASSIUM CHLORIDE 10 MEQ: 7.45 INJECTION INTRAVENOUS at 10:00

## 2024-06-23 RX ADMIN — Medication 300 MG: at 08:54

## 2024-06-23 RX ADMIN — BACLOFEN 10 MG: 10 TABLET ORAL at 08:46

## 2024-06-23 RX ADMIN — GLYCOPYRROLATE 1 MG: 1 TABLET ORAL at 21:52

## 2024-06-23 RX ADMIN — AZELASTINE HYDROCHLORIDE 2 SPRAY: 137 SPRAY, METERED NASAL at 08:47

## 2024-06-23 RX ADMIN — SODIUM CHLORIDE 500 ML: 9 INJECTION, SOLUTION INTRAVENOUS at 00:53

## 2024-06-23 RX ADMIN — CETIRIZINE HYDROCHLORIDE 5 MG: 10 TABLET, FILM COATED ORAL at 08:45

## 2024-06-23 ASSESSMENT — PAIN SCALES - WONG BAKER
WONGBAKER_NUMERICALRESPONSE: NO HURT

## 2024-06-23 NOTE — PROGRESS NOTES
Rapid Called at 0045    Responded to RRT at 0045 for Hypotension    Provider at bedside: NO  Interventions ordered: Labs and EKG, bolus  Sepsis Suspected: No  Transfer to Higher Level of Care: na  Blood Glucose: 115     Vitals:    06/23/24 0045   BP: (!) 92/55   Pulse:    Resp:    Temp:    SpO2:         Rapid Ended at 0115  RRT RN assisted with transport to accepting unit NA.    Lawson Anna RN

## 2024-06-23 NOTE — FLOWSHEET NOTE
Patient w/ episode of hypotension - 73/49. NAD. Order for bolus placed. Will check AM labs now. Monitor fluid status.    ADDENDUM: Potassium 3.3 on AM labs. Will replete.

## 2024-06-23 NOTE — PROGRESS NOTES
[facility]  [address] [place] [address] [phone]      Hospitalist Progress Note      NAME: Ayden Simpson   :  1965  MRM:  [id number]    Date of service: 2024  9:42 AM       Assessment and Plan:   Acute respiratory failure. Secondary to pneumonia with concern for aspiration. Completed IV antibiotics. Continue scheduled nebulizers.  Finished IV steroid.  Improved.  Off of oxygen.  Evaluated by pulm     2.  Concern for aspiration/reflux/vomiting/GERD. PEG exchange for J tube on .  Started on tube feeding per nutrition recommendation.  Off PPN.  Continue PPI, antiemetics.  Watch for refeeding syndrome.  Check electrolytes.  Evaluated by GI and general surgery     3.  Sepsis POA.  Resolved.  Due to pneumonia     4.  Pulmonary edema on chest x-ray.  Normal EF and wall motion.  Noted to have PFO. Severe right to left shunt was noted. Atrial septal aneurysm present.  Will discuss with cardiology     5.  Elevated LFTs.  Improved.  Possible related to antibiotics.  Ultrasound of the liver shows small gallstone otherwise unremarkable.  Hepatitis panel is negative.  Monitor     6.  Electrolyte abnormalities. Likely due to poor nutrition.  Replete as needed.  Evaluated by nutrition     7.  Cerebral palsy/ mood disorder. Continue home baclofen, risperidone, Remeron and ativan      8.  Hypotension.  Started on normal saline.  Bolus was given.  Monitor            Subjective:     Chief Complaint:: Patient was seen and examined as a follow up for acute hypoxic respiratory failure.  Chart was reviewed.  Nonverbal.      ROS:  (bold if positive, if negative)    Tolerating PT  Tolerating Diet        Objective:     Last 24hrs VS reviewed since prior progress note. Most recent are:    Vitals:    24 0737   BP:    Pulse: 85   Resp: 16   Temp:    SpO2: 98%     SpO2 Readings from Last 6 Encounters:   24 98%          Intake/Output Summary (Last 24 hours) at 2024  pantoprazole (PROTONIX) 40 mg in sodium chloride (PF) 0.9 % 10 mL injection  40 mg IntraVENous Q12H    bisacodyl (DULCOLAX) suppository 10 mg  10 mg Rectal Daily PRN    racepinephrine HCl (VAPONEFPRIN) 2.25 % nebulizer solution NEBU 0.5 mL  0.5 mL Nebulization Q4H PRN    azelastine (ASTELIN) 0.1 % nasal spray 2 spray  2 spray Each Nostril BID    baclofen (LIORESAL) tablet 10 mg  10 mg PEG Tube TID    ipratropium (ATROVENT) 0.03 % nasal spray 2 spray  2 spray Nasal BID    risperiDONE (RisperDAL) 1 MG/ML oral solution 1 mg  1 mg PEG Tube TID    risperiDONE (RisperDAL) 1 MG/ML oral solution 1 mg  1 mg Oral Q8H PRN    sodium chloride flush 0.9 % injection 5-40 mL  5-40 mL IntraVENous 2 times per day    sodium chloride flush 0.9 % injection 5-40 mL  5-40 mL IntraVENous PRN    0.9 % sodium chloride infusion   IntraVENous PRN    [Held by provider] enoxaparin Sodium (LOVENOX) injection 30 mg  30 mg SubCUTAneous Daily    glycopyrrolate (ROBINUL) tablet 1 mg  1 mg Per G Tube TID        Lab Data Reviewed: (see below)  Lab Review:     Recent Labs     06/21/24 0415 06/22/24 0427 06/23/24  0407   WBC 9.4 10.6 12.1*   HGB 15.4 15.1 15.9   HCT 44.4 43.8 46.1    194 188       Recent Labs     06/21/24 0415 06/22/24  0427 06/23/24  0407    139 137   K 3.5 3.1* 3.3*   * 109* 105   CO2 23 24 23   BUN 34* 26* 23*   MG 2.7* 2.3 2.3   PHOS 3.4 3.3 3.0   ALT 85* 103* 108*       No results found for: \"GLUCPOC\"  No results for input(s): \"PH\", \"PCO2\", \"PO2\", \"HCO3\", \"FIO2\" in the last 72 hours.  No results for input(s): \"INR\" in the last 72 hours.  [unfilled]    I have reviewed notes of prior 24hr.    Other pertinent lab:     Total time: -25- minutes. I personally saw and examined the patient during this time period.  Greater than 50% of this time was spent in counseling and coordination of care.    I personally reviewed chart, notes, data and current medications in the medical record.  I have personally examined and

## 2024-06-24 ENCOUNTER — HOME HEALTH ADMISSION (OUTPATIENT)
Dept: HOME HEALTH SERVICES | Facility: HOME HEALTH | Age: 59
End: 2024-06-24
Payer: MEDICAID

## 2024-06-24 LAB
ALBUMIN SERPL-MCNC: 3.4 G/DL (ref 3.5–5)
ALBUMIN/GLOB SERPL: 1.3 (ref 1.1–2.2)
ALP SERPL-CCNC: 79 U/L (ref 45–117)
ALT SERPL-CCNC: 89 U/L (ref 12–78)
ANION GAP SERPL CALC-SCNC: 6 MMOL/L (ref 5–15)
AST SERPL-CCNC: 57 U/L (ref 15–37)
BASOPHILS # BLD: 0 K/UL (ref 0–0.1)
BASOPHILS NFR BLD: 0 % (ref 0–1)
BILIRUB SERPL-MCNC: 1.4 MG/DL (ref 0.2–1)
BUN SERPL-MCNC: 13 MG/DL (ref 6–20)
BUN/CREAT SERPL: 21 (ref 12–20)
CALCIUM SERPL-MCNC: 8.5 MG/DL (ref 8.5–10.1)
CHLORIDE SERPL-SCNC: 113 MMOL/L (ref 97–108)
CO2 SERPL-SCNC: 19 MMOL/L (ref 21–32)
CREAT SERPL-MCNC: 0.61 MG/DL (ref 0.7–1.3)
DIFFERENTIAL METHOD BLD: ABNORMAL
EKG ATRIAL RATE: 77 BPM
EKG DIAGNOSIS: NORMAL
EKG P AXIS: 39 DEGREES
EKG P-R INTERVAL: 120 MS
EKG Q-T INTERVAL: 382 MS
EKG QRS DURATION: 72 MS
EKG QTC CALCULATION (BAZETT): 432 MS
EKG R AXIS: 24 DEGREES
EKG T AXIS: 45 DEGREES
EKG VENTRICULAR RATE: 77 BPM
EOSINOPHIL # BLD: 0.3 K/UL (ref 0–0.4)
EOSINOPHIL NFR BLD: 3 % (ref 0–7)
ERYTHROCYTE [DISTWIDTH] IN BLOOD BY AUTOMATED COUNT: 12.7 % (ref 11.5–14.5)
GLOBULIN SER CALC-MCNC: 2.7 G/DL (ref 2–4)
GLUCOSE SERPL-MCNC: 87 MG/DL (ref 65–100)
HCT VFR BLD AUTO: 42 % (ref 36.6–50.3)
HGB BLD-MCNC: 14.4 G/DL (ref 12.1–17)
IMM GRANULOCYTES # BLD AUTO: 0.1 K/UL (ref 0–0.04)
IMM GRANULOCYTES NFR BLD AUTO: 1 % (ref 0–0.5)
LYMPHOCYTES # BLD: 0.8 K/UL (ref 0.8–3.5)
LYMPHOCYTES NFR BLD: 7 % (ref 12–49)
MAGNESIUM SERPL-MCNC: 2.2 MG/DL (ref 1.6–2.4)
MCH RBC QN AUTO: 32.4 PG (ref 26–34)
MCHC RBC AUTO-ENTMCNC: 34.3 G/DL (ref 30–36.5)
MCV RBC AUTO: 94.6 FL (ref 80–99)
MONOCYTES # BLD: 0.8 K/UL (ref 0–1)
MONOCYTES NFR BLD: 6 % (ref 5–13)
NEUTS SEG # BLD: 10.2 K/UL (ref 1.8–8)
NEUTS SEG NFR BLD: 84 % (ref 32–75)
NRBC # BLD: 0 K/UL (ref 0–0.01)
NRBC BLD-RTO: 0 PER 100 WBC
PLATELET # BLD AUTO: 187 K/UL (ref 150–400)
PMV BLD AUTO: 10.4 FL (ref 8.9–12.9)
POTASSIUM SERPL-SCNC: 4.6 MMOL/L (ref 3.5–5.1)
PROT SERPL-MCNC: 6.1 G/DL (ref 6.4–8.2)
RBC # BLD AUTO: 4.44 M/UL (ref 4.1–5.7)
SODIUM SERPL-SCNC: 138 MMOL/L (ref 136–145)
WBC # BLD AUTO: 12.2 K/UL (ref 4.1–11.1)

## 2024-06-24 PROCEDURE — 94640 AIRWAY INHALATION TREATMENT: CPT

## 2024-06-24 PROCEDURE — 80053 COMPREHEN METABOLIC PANEL: CPT

## 2024-06-24 PROCEDURE — 2580000003 HC RX 258: Performed by: INTERNAL MEDICINE

## 2024-06-24 PROCEDURE — 94761 N-INVAS EAR/PLS OXIMETRY MLT: CPT

## 2024-06-24 PROCEDURE — 6370000000 HC RX 637 (ALT 250 FOR IP): Performed by: HOSPITALIST

## 2024-06-24 PROCEDURE — 6370000000 HC RX 637 (ALT 250 FOR IP): Performed by: INTERNAL MEDICINE

## 2024-06-24 PROCEDURE — 94669 MECHANICAL CHEST WALL OSCILL: CPT

## 2024-06-24 PROCEDURE — 2580000003 HC RX 258: Performed by: NURSE PRACTITIONER

## 2024-06-24 PROCEDURE — 2500000003 HC RX 250 WO HCPCS: Performed by: INTERNAL MEDICINE

## 2024-06-24 PROCEDURE — 6370000000 HC RX 637 (ALT 250 FOR IP): Performed by: NURSE PRACTITIONER

## 2024-06-24 PROCEDURE — 85025 COMPLETE CBC W/AUTO DIFF WBC: CPT

## 2024-06-24 PROCEDURE — 6360000002 HC RX W HCPCS: Performed by: INTERNAL MEDICINE

## 2024-06-24 PROCEDURE — 83735 ASSAY OF MAGNESIUM: CPT

## 2024-06-24 PROCEDURE — 2580000003 HC RX 258: Performed by: HOSPITALIST

## 2024-06-24 PROCEDURE — C9113 INJ PANTOPRAZOLE SODIUM, VIA: HCPCS | Performed by: INTERNAL MEDICINE

## 2024-06-24 PROCEDURE — 36415 COLL VENOUS BLD VENIPUNCTURE: CPT

## 2024-06-24 PROCEDURE — 6370000000 HC RX 637 (ALT 250 FOR IP)

## 2024-06-24 PROCEDURE — 6360000002 HC RX W HCPCS: Performed by: HOSPITALIST

## 2024-06-24 PROCEDURE — 1100000000 HC RM PRIVATE

## 2024-06-24 RX ORDER — MIDODRINE HYDROCHLORIDE 5 MG/1
5 TABLET ORAL
Status: DISCONTINUED | OUTPATIENT
Start: 2024-06-24 | End: 2024-06-28 | Stop reason: HOSPADM

## 2024-06-24 RX ORDER — 0.9 % SODIUM CHLORIDE 0.9 %
500 INTRAVENOUS SOLUTION INTRAVENOUS ONCE
Status: COMPLETED | OUTPATIENT
Start: 2024-06-24 | End: 2024-06-24

## 2024-06-24 RX ORDER — MIDODRINE HYDROCHLORIDE 5 MG/1
5 TABLET ORAL ONCE
Status: COMPLETED | OUTPATIENT
Start: 2024-06-24 | End: 2024-06-24

## 2024-06-24 RX ADMIN — GLYCOPYRROLATE 1 MG: 1 TABLET ORAL at 09:51

## 2024-06-24 RX ADMIN — ENOXAPARIN SODIUM 30 MG: 100 INJECTION SUBCUTANEOUS at 09:49

## 2024-06-24 RX ADMIN — AZELASTINE HYDROCHLORIDE 2 SPRAY: 137 SPRAY, METERED NASAL at 09:52

## 2024-06-24 RX ADMIN — IPRATROPIUM BROMIDE AND ALBUTEROL SULFATE 1 DOSE: .5; 3 SOLUTION RESPIRATORY (INHALATION) at 13:14

## 2024-06-24 RX ADMIN — SODIUM CHLORIDE, PRESERVATIVE FREE 10 ML: 5 INJECTION INTRAVENOUS at 22:07

## 2024-06-24 RX ADMIN — AZELASTINE HYDROCHLORIDE 2 SPRAY: 137 SPRAY, METERED NASAL at 22:07

## 2024-06-24 RX ADMIN — Medication 300 MG: at 09:51

## 2024-06-24 RX ADMIN — IPRATROPIUM BROMIDE AND ALBUTEROL SULFATE 1 DOSE: .5; 3 SOLUTION RESPIRATORY (INHALATION) at 08:58

## 2024-06-24 RX ADMIN — SODIUM CHLORIDE, PRESERVATIVE FREE 40 MG: 5 INJECTION INTRAVENOUS at 22:07

## 2024-06-24 RX ADMIN — BACLOFEN 10 MG: 10 TABLET ORAL at 09:51

## 2024-06-24 RX ADMIN — MIDODRINE HYDROCHLORIDE 5 MG: 5 TABLET ORAL at 09:51

## 2024-06-24 RX ADMIN — GUAIFENESIN 400 MG: 200 SOLUTION ORAL at 09:51

## 2024-06-24 RX ADMIN — CETIRIZINE HYDROCHLORIDE 5 MG: 10 TABLET, FILM COATED ORAL at 09:50

## 2024-06-24 RX ADMIN — RISPERIDONE 1 MG: 1 SOLUTION ORAL at 09:50

## 2024-06-24 RX ADMIN — MIDODRINE HYDROCHLORIDE 5 MG: 5 TABLET ORAL at 12:15

## 2024-06-24 RX ADMIN — SODIUM CHLORIDE 500 ML: 9 INJECTION, SOLUTION INTRAVENOUS at 00:58

## 2024-06-24 RX ADMIN — IPRATROPIUM BROMIDE AND ALBUTEROL SULFATE 1 DOSE: .5; 3 SOLUTION RESPIRATORY (INHALATION) at 19:55

## 2024-06-24 RX ADMIN — IPRATROPIUM BROMIDE 2 SPRAY: 21 SPRAY NASAL at 09:52

## 2024-06-24 RX ADMIN — Medication 1 CAPSULE: at 09:51

## 2024-06-24 RX ADMIN — LORAZEPAM 2 MG: 1 TABLET ORAL at 09:52

## 2024-06-24 RX ADMIN — SODIUM CHLORIDE, PRESERVATIVE FREE 10 ML: 5 INJECTION INTRAVENOUS at 09:52

## 2024-06-24 RX ADMIN — SODIUM CHLORIDE, PRESERVATIVE FREE 40 MG: 5 INJECTION INTRAVENOUS at 09:49

## 2024-06-24 RX ADMIN — IPRATROPIUM BROMIDE 2 SPRAY: 21 SPRAY NASAL at 22:14

## 2024-06-24 RX ADMIN — PANCRELIPASE 1 DOSE: 10440; 39150; 39150 TABLET ORAL at 10:43

## 2024-06-24 RX ADMIN — MIDODRINE HYDROCHLORIDE 5 MG: 5 TABLET ORAL at 02:45

## 2024-06-24 RX ADMIN — SODIUM CHLORIDE: 9 INJECTION, SOLUTION INTRAVENOUS at 11:43

## 2024-06-24 ASSESSMENT — PAIN SCALES - WONG BAKER
WONGBAKER_NUMERICALRESPONSE: NO HURT
WONGBAKER_NUMERICALRESPONSE: NO HURT

## 2024-06-24 ASSESSMENT — PAIN SCALES - GENERAL: PAINLEVEL_OUTOF10: 0

## 2024-06-24 NOTE — PROGRESS NOTES
Patency of G tube was lost. Viokace given with little success. Unable to flush Gtube for medications. Dr. Winn aware and informed me that GI will come to assess and to hold medications until that happens.

## 2024-06-24 NOTE — PROGRESS NOTES
[facility]  [address] [place] [address] [phone]      Hospitalist Progress Note      NAME: Ayden Simpson   :  1965  MRM:  [id number]    Date of service: 2024  9:23 AM       Assessment and Plan:   Acute respiratory failure. Secondary to pneumonia with concern for aspiration. Completed IV antibiotics. Continue scheduled nebulizers.  Finished IV steroid.  Improved.  Off of oxygen.  Evaluated by pulm     2.  Concern for aspiration/reflux/vomiting/GERD. PEG exchange for J tube on .  Started on tube feeding per nutrition recommendation.  Off PPN.  Continue PPI, antiemetics.  Watch for refeeding syndrome.  Check electrolytes.  Evaluated by GI and general surgery     3.  Sepsis POA.  Resolved.  Due to pneumonia     4.  Pulmonary edema on chest x-ray.  Normal EF and wall motion.  Noted to have PFO. Severe right to left shunt was noted. Atrial septal aneurysm present.        5.  Elevated LFTs.  Improved.  Possible related to antibiotics.  Ultrasound of the liver shows small gallstone otherwise unremarkable.  Hepatitis panel is negative.  Monitor     6.  Electrolyte abnormalities. Likely due to poor nutrition.  Replete as needed.  Evaluated by nutrition     7.  Cerebral palsy/ mood disorder. Continue home baclofen, risperidone, Remeron and ativan      8.  Hypotension.  Started on normal saline.  Bolus was given.  Added midodrine. Monitor            Subjective:     Chief Complaint:: Patient was seen and examined as a follow up for acute hypoxic respiratory failure.  Chart was reviewed.  Nonverbal.      ROS:  (bold if positive, if negative)    Tolerating PT  Tolerating Diet        Objective:     Last 24hrs VS reviewed since prior progress note. Most recent are:    Vitals:    24 0753   BP: (!) 94/54   Pulse: 82   Resp: 18   Temp: 98.2 °F (36.8 °C)   SpO2: 95%     SpO2 Readings from Last 6 Encounters:   24 95%          Intake/Output Summary (Last 24  Per G Tube Daily    mirtazapine (REMERON) tablet 15 mg  15 mg Per G Tube Nightly    pantoprazole (PROTONIX) 40 mg in sodium chloride (PF) 0.9 % 10 mL injection  40 mg IntraVENous Q12H    bisacodyl (DULCOLAX) suppository 10 mg  10 mg Rectal Daily PRN    racepinephrine HCl (VAPONEFPRIN) 2.25 % nebulizer solution NEBU 0.5 mL  0.5 mL Nebulization Q4H PRN    azelastine (ASTELIN) 0.1 % nasal spray 2 spray  2 spray Each Nostril BID    baclofen (LIORESAL) tablet 10 mg  10 mg PEG Tube TID    ipratropium (ATROVENT) 0.03 % nasal spray 2 spray  2 spray Nasal BID    risperiDONE (RisperDAL) 1 MG/ML oral solution 1 mg  1 mg PEG Tube TID    risperiDONE (RisperDAL) 1 MG/ML oral solution 1 mg  1 mg Oral Q8H PRN    sodium chloride flush 0.9 % injection 5-40 mL  5-40 mL IntraVENous 2 times per day    sodium chloride flush 0.9 % injection 5-40 mL  5-40 mL IntraVENous PRN    0.9 % sodium chloride infusion   IntraVENous PRN    enoxaparin Sodium (LOVENOX) injection 30 mg  30 mg SubCUTAneous Daily    glycopyrrolate (ROBINUL) tablet 1 mg  1 mg Per G Tube TID        Lab Data Reviewed: (see below)  Lab Review:     Recent Labs     06/22/24 0427 06/23/24  0407 06/24/24  0527   WBC 10.6 12.1* 12.2*   HGB 15.1 15.9 14.4   HCT 43.8 46.1 42.0    188 187       Recent Labs     06/22/24 0427 06/23/24  0407 06/24/24  0315    137 138   K 3.1* 3.3* 4.6   * 105 113*   CO2 24 23 19*   BUN 26* 23* 13   MG 2.3 2.3 2.2   PHOS 3.3 3.0  --    * 108* 89*       No results found for: \"GLUCPOC\"  No results for input(s): \"PH\", \"PCO2\", \"PO2\", \"HCO3\", \"FIO2\" in the last 72 hours.  No results for input(s): \"INR\" in the last 72 hours.  [unfilled]    I have reviewed notes of prior 24hr.    Other pertinent lab:     Total time: -25- minutes. I personally saw and examined the patient during this time period.  Greater than 50% of this time was spent in counseling and coordination of care.    I personally reviewed chart, notes, data and current

## 2024-06-24 NOTE — SIGNIFICANT EVENT
Notified by nursing of pts BP drop to 78/52 (61), Heart rate sinus in 80's, pt with similar event around same time last night. Concern for possible medication related drop. Review of evening medications that were provided around 2145  Baclofen/ativan/Remeron - could this combination cause this drop?   Will provide NS bolus as he responded well last evening to this route with similar vitals.     Will follow BP trends post bolus

## 2024-06-24 NOTE — PROGRESS NOTES
[facility] pulmonary follow up set for Monday July 1st 2024 at 1:15 PM with Doctor Kam at their [facility] location. Office asks that patient arrive 15-20 mins early for check in, brings picture ID , any insurance cards and list of medications.    -Pretty Martins  Case Management Specialist

## 2024-06-24 NOTE — PROGRESS NOTES
RRT evaluation:Pt LOC has improved to writer, he started to respond to verbal stimuli, when writer called his name. He would smile to me. Skin color is appropriate for his ethnicity, no cardiovascular distress right now. Low BP has been addressed, keep current POC.     Sepsis Score 19    Predictive Model Details          19 (Low)  Factor Value    Calculated 6/24/2024 03:20 8% Bilirubin 1.6 MG/DL    BS EARLY DETECTION OF SEPSIS VERSION 2 Model 7% Total Active Inpatient Meds 26     6% Systolic Blood Pressure 80     5% O2 Delivery Method ROOM AIR     5% Age 58 years old     5% [place] Scale 12     -4% Has Imaging Procedure 0     4% Blood PH 7.44       4% [place] Coma Scale 9     3% Change in RBC Count 4.73 M/uL -> 4.96 M/uL        Predictive Model Details          61 (Warning)  Factor Value    Calculated 6/24/2024 03:24 39% [place] coma scale 9    Deterioration Index Model 20% Systolic 80     16% Age 58 years old     15% Neurological exam X     4% WBC count abnormal (12.1 K/uL)     2% BUN abnormal (23 MG/DL)     2% Potassium abnormal (3.3 mmol/L)     1% Sodium 137 mmol/L     1% Pulse oximetry 94 %     0% Hematocrit 46.1 %     0% Pulse 77     0% Temperature 98.1 °F (36.7 °C)     0% Respiratory rate 16          Spoke with primary RN regarding pt status. Pt resting comfortably in bed with no s/s of distress. Denies CP or SOB. No further interventions at this time.     Lawson Anna, RN

## 2024-06-24 NOTE — PLAN OF CARE
Problem: Discharge Planning  Goal: Discharge to home or other facility with appropriate resources  Outcome: Progressing     Problem: Safety - Adult  Goal: Free from fall injury  6/24/2024 1250 by Nilam Mcintyre RN  Outcome: Progressing  6/24/2024 0103 by Kate Sy RN  Outcome: Progressing     Problem: Nutrition Deficit:  Goal: Optimize nutritional status  6/24/2024 1250 by Nilam Mcintyre RN  Outcome: Progressing  6/24/2024 0103 by Kate Sy RN  Outcome: Progressing     Problem: Pain  Goal: Verbalizes/displays adequate comfort level or baseline comfort level  6/24/2024 1250 by Nilam Mcintyre RN  Outcome: Progressing  6/24/2024 0103 by Kate Sy RN  Outcome: Progressing     Problem: Skin/Tissue Integrity  Goal: Absence of new skin breakdown  Description: 1.  Monitor for areas of redness and/or skin breakdown  2.  Assess vascular access sites hourly  3.  Every 4-6 hours minimum:  Change oxygen saturation probe site  4.  Every 4-6 hours:  If on nasal continuous positive airway pressure, respiratory therapy assess nares and determine need for appliance change or resting period.  6/24/2024 1250 by Nilam Mcintyre RN  Outcome: Progressing  6/24/2024 0103 by Kate Sy RN  Outcome: Progressing     Problem: Respiratory - Adult  Goal: Achieves optimal ventilation and oxygenation  6/24/2024 1250 by Nilam Mcintyre RN  Outcome: Progressing  6/24/2024 0103 by Kate Sy RN  Outcome: Progressing     Problem: ABCDS Injury Assessment  Goal: Absence of physical injury  6/24/2024 1250 by Nilam Mcintyre RN  Outcome: Progressing  6/24/2024 0103 by Kate Sy RN  Outcome: Progressing

## 2024-06-24 NOTE — PLAN OF CARE
Problem: Safety - Adult  Goal: Free from fall injury  Outcome: Progressing     Problem: Nutrition Deficit:  Goal: Optimize nutritional status  Outcome: Progressing     Problem: Pain  Goal: Verbalizes/displays adequate comfort level or baseline comfort level  Outcome: Progressing     Problem: Skin/Tissue Integrity  Goal: Absence of new skin breakdown  Description: 1.  Monitor for areas of redness and/or skin breakdown  2.  Assess vascular access sites hourly  3.  Every 4-6 hours minimum:  Change oxygen saturation probe site  4.  Every 4-6 hours:  If on nasal continuous positive airway pressure, respiratory therapy assess nares and determine need for appliance change or resting period.  Outcome: Progressing     Problem: Respiratory - Adult  Goal: Achieves optimal ventilation and oxygenation  Outcome: Progressing     Problem: ABCDS Injury Assessment  Goal: Absence of physical injury  Outcome: Progressing

## 2024-06-24 NOTE — CARE COORDINATION
6/24/2024   Care Management Progress Note    Admitting diagnosis:  H/O cerebral palsy [Z86.69]  Sepsis (HCC) [A41.9]  Severe sepsis (HCC) [A41.9, R65.20]  Pneumonia of left lung due to infectious organism, unspecified part of lung [J18.9]    Admit Date:  6/12/2024  3:42 PM    RUR:  14%  Risk Level: [x]Low []Moderate []High    Transition of care plan:  Ongoing medical management  GI consulted, EGD tomorrow  Jtube placed, new tube feedings sent to [facility]   PT and OT consulted: No  Anticipated discharge plan: Return to [facility].  Discharge transport: Medicaid stretcher       6/24/2024 4:04 PM CM received return phone call from Mitzi with pt's group home, preference for home health is [facility].     6/24/2024  3:55 PM [facility] have accepted for home health. CM called and lvm with pt's group home owner, Mitzi to confirm choice.  BRITTNY also sent home tube feedings order and RD note to [facility] via [facility].   BRITTNY called and lvm with pt's guardianAlejandra at [phone].     6/24/2024 12:39 PM Anticipating discharge tomorrow, 6/25 if pt is able to tolerate tube feedings.  BRITTNY met with pt's group home owner, Mitzi to discussed discharge plan.  Mitzi confirmed pt's tube feedings at home are through [facility].     BRITTNY discussed care at home, Mitzi requested home health PT for pt , Pulm follow up scheduled and transport for pt at discharge.     CM sent updates including RD note to [facility] via [facility]. Notified of new tube placement and new formula ordered via [facility].    CM Specialist arranged for pt's Pulm follow up.    CM sent home health referrals to agencies via [facility] that service pt's home address to check insurance network.  Referrals sent to:  [facility]- accepted   [facility]- denied due to insurance  [facility]- denied due to insurance   [facility]- denied due to insurance     CM will arrange transport at discharge through pt's [employer].   CM will follow. GENARO Pichardo          06/24/24 3623   Discharge Planning   Type of Residence Group Home   Current Services Prior To Admission Home Infusion   Potential Assistance Needed Home Care   DME Ordered? No   Potential Assistance Purchasing Medications Yes   Type of Home Care Services PT;Nursing Services   Patient expects to be discharged to: [facility]   Services At/After Discharge   Transition of Care Consult (CM Consult) Home Health;Infusion Center   Internal Home Health Yes   Services At/After Discharge Home Health  (Infusion for tube feedings)   Mode of Transport at Discharge Other (see comment)  (Medicaid stretcher)   Condition of Participation: Discharge Planning   The Plan for Transition of Care is related to the following treatment goals: home health and home infusion   The Patient and/or Patient Representative was provided with a Choice of Provider? Patient Representative   The Patient and/Or Patient Representative agree with the Discharge Plan? Yes   Freedom of Choice list was provided with basic dialogue that supports the patient's individualized plan of care/goals, treatment preferences, and shares the quality data associated with the providers?  Yes

## 2024-06-24 NOTE — PROGRESS NOTES
Comprehensive Nutrition Assessment    Type and Reason for Visit: Reassess    Nutrition Recommendations/Plan:   Continue  J-feedings as follows:   Formula: [employer] Peptide 1.5 Plain:   GOAL TF: 50 mL x 18 hours daily (total volume goal = ~875 mL/d)       2. Free Water Flush 60 mL Q2 hours around the clock or total volume ~720 mL/day (some may be given via G-tube as appropriate/needed)    3. Resume/Begin daily children's multi-vitamin via G-tube to meet micronutrient needs     4. Keep HOB > 30 degrees at all times unless otherwise directed by a physician.     5. Order Viokase PRN up to 4 times daily for clogged feeding tube - discussed with MD & ordered      Tube Feeding GOAL: Ana Baifendian Peptide 1.5 Plain 875 mL/day + 720 ml free water daily   provides: 1312 kcal, 65 g protein, 120 g carbohydrate, 67 g fat, 13 g fiber, 615 mL of free water + 720 flushes = ~1335 mL of free water daily.     Malnutrition Assessment:  Malnutrition Status:  Moderate malnutrition (06/21/24 1554)    Context:  Acute Illness     Findings of the 6 clinical characteristics of malnutrition:  Energy Intake:  75% or less of estimated energy requirements for 7 or more days  Weight Loss:  Greater than 2% over 1 week     Body Fat Loss:  Unable to assess (due to CP)     Muscle Mass Loss:  Unable to assess (due to CP)    Fluid Accumulation:  No significant fluid accumulation     Strength:  Not Performed     Nutrition Assessment:    58 year old Male admitted with H/O cerebral palsy [Z86.69]  Sepsis (HCC) [A41.9]  Severe sepsis (HCC) [A41.9, R65.20]  Pneumonia of left lung due to infectious organism, unspecified part of lung [J18.9] who has a past medical history of Anxiety, Cerebral palsy (HCC), Contracted, joint, multiple sites, Developmental non-verbal disorder, Encephalopathy chronic, GERD (gastroesophageal reflux disease), Hip dysplasia, Ill-defined condition, Intellectual disability, Mass of bladder, Microcephaly (HCC), and Muscle spasm.      On: Kcal/kg  Weight Used for Energy Requirements: Current    Energy (kcal/day): 1290 kcal/d (30 kcal/kg)  Weight Used for Protein Requirements: Current  Protein (g/day): 52 g/d (1.2 g/kg)  Method Used for Fluid Requirements: 1 ml/kcal  Fluid (ml/day): 1290 mL/d    Nutrition Related Findings:   Edema: Left upper extremity         LUE Edema: Trace  RLE Edema: +1  LLE Edema: +1    Bowel Movement:  Last BM (including prior to admit): 06/22/24    Wounds: Wound Type: None (G-J tube)    Nutrition Related Labs:  Lab Results   Component Value Date    CREATININE 0.61 (L) 06/24/2024    BUN 13 06/24/2024     06/24/2024    K 4.6 06/24/2024     (H) 06/24/2024    CO2 19 (L) 06/24/2024     Lab Results   Component Value Date    TRIG 150 (H) 06/21/2024    TRIG 62 06/13/2024    TRIG 221 (H) 07/21/2023     Current Medications:  Scheduled Meds:   midodrine  5 mg PEG Tube TID WC    ipratropium 0.5 mg-albuterol 2.5 mg  1 Dose Inhalation Q6H WA RT    LORazepam  2 mg Per G Tube BID    lactobacillus  1 capsule PEG Tube Daily with breakfast    racepinephrine HCl  0.5 mL Nebulization Once    ferrous Sulfate  300 mg Per G Tube Daily    guaiFENesin  400 mg Per G Tube BID    cetirizine  5 mg Per G Tube Daily    mirtazapine  15 mg Per G Tube Nightly    pantoprazole (PROTONIX) 40 mg in sodium chloride (PF) 0.9 % 10 mL injection  40 mg IntraVENous Q12H    azelastine  2 spray Each Nostril BID    baclofen  10 mg PEG Tube TID    ipratropium  2 spray Nasal BID    risperiDONE  1 mg PEG Tube TID    sodium chloride flush  5-40 mL IntraVENous 2 times per day    glycopyrrolate  1 mg Per G Tube TID     Continuous Infusions:   sodium chloride 100 mL/hr at 06/24/24 1143    sodium chloride Stopped (06/21/24 1536)     PRN Meds: ondansetron, LORazepam, glycopyrrolate, sodium chloride nebulizer, bisacodyl, racepinephrine HCl, risperiDONE, sodium chloride flush, sodium chloride    Current Nutrition Therapies:    Diet: Diet NPO Exceptions are: Sips of

## 2024-06-24 NOTE — PROGRESS NOTES
Gastroenterology attending progress note    J-tube extension noted to be located in D2 segment.  Unfortunately at the time of the endoscopic procedure could not be tethered secondary to break off of the lariat at the jejunal tip tether to the small bowel mucosa.  In discussion with the patient's caregiver, we will plan to repeat the upper endoscopy tomorrow with replacement of the 12 Swedish J-tube extension.  Will make the patient n.p.o. after midnight, hold additional Lovenox doses for DVT prophylaxis and proceed with EGD tomorrow at 11 AM.

## 2024-06-25 ENCOUNTER — APPOINTMENT (OUTPATIENT)
Facility: HOSPITAL | Age: 59
End: 2024-06-25
Payer: MEDICAID

## 2024-06-25 ENCOUNTER — APPOINTMENT (OUTPATIENT)
Facility: HOSPITAL | Age: 59
End: 2024-06-25
Attending: INTERNAL MEDICINE
Payer: MEDICAID

## 2024-06-25 LAB
ALBUMIN SERPL-MCNC: 3.6 G/DL (ref 3.5–5)
ALBUMIN/GLOB SERPL: 1.8 (ref 1.1–2.2)
ALP SERPL-CCNC: 77 U/L (ref 45–117)
ALT SERPL-CCNC: 70 U/L (ref 12–78)
ANION GAP SERPL CALC-SCNC: 6 MMOL/L (ref 5–15)
AST SERPL-CCNC: 19 U/L (ref 15–37)
BASOPHILS # BLD: 0 K/UL (ref 0–0.1)
BASOPHILS NFR BLD: 0 % (ref 0–1)
BILIRUB SERPL-MCNC: 1 MG/DL (ref 0.2–1)
BUN SERPL-MCNC: 14 MG/DL (ref 6–20)
BUN/CREAT SERPL: 25 (ref 12–20)
CALCIUM SERPL-MCNC: 8.7 MG/DL (ref 8.5–10.1)
CHLORIDE SERPL-SCNC: 110 MMOL/L (ref 97–108)
CO2 SERPL-SCNC: 25 MMOL/L (ref 21–32)
CREAT SERPL-MCNC: 0.56 MG/DL (ref 0.7–1.3)
DIFFERENTIAL METHOD BLD: ABNORMAL
ECHO BSA: 1.33 M2
EOSINOPHIL # BLD: 0.3 K/UL (ref 0–0.4)
EOSINOPHIL NFR BLD: 4 % (ref 0–7)
ERYTHROCYTE [DISTWIDTH] IN BLOOD BY AUTOMATED COUNT: 12.7 % (ref 11.5–14.5)
GLOBULIN SER CALC-MCNC: 2 G/DL (ref 2–4)
GLUCOSE SERPL-MCNC: 86 MG/DL (ref 65–100)
HCT VFR BLD AUTO: 41.2 % (ref 36.6–50.3)
HGB BLD-MCNC: 14 G/DL (ref 12.1–17)
IMM GRANULOCYTES # BLD AUTO: 0.1 K/UL (ref 0–0.04)
IMM GRANULOCYTES NFR BLD AUTO: 1 % (ref 0–0.5)
LYMPHOCYTES # BLD: 1.3 K/UL (ref 0.8–3.5)
LYMPHOCYTES NFR BLD: 18 % (ref 12–49)
MAGNESIUM SERPL-MCNC: 2.2 MG/DL (ref 1.6–2.4)
MCH RBC QN AUTO: 32.2 PG (ref 26–34)
MCHC RBC AUTO-ENTMCNC: 34 G/DL (ref 30–36.5)
MCV RBC AUTO: 94.7 FL (ref 80–99)
MONOCYTES # BLD: 0.7 K/UL (ref 0–1)
MONOCYTES NFR BLD: 10 % (ref 5–13)
NEUTS SEG # BLD: 4.9 K/UL (ref 1.8–8)
NEUTS SEG NFR BLD: 67 % (ref 32–75)
NRBC # BLD: 0 K/UL (ref 0–0.01)
NRBC BLD-RTO: 0 PER 100 WBC
PHOSPHATE SERPL-MCNC: 3.2 MG/DL (ref 2.6–4.7)
PLATELET # BLD AUTO: 195 K/UL (ref 150–400)
PMV BLD AUTO: 10.5 FL (ref 8.9–12.9)
POTASSIUM SERPL-SCNC: 3.8 MMOL/L (ref 3.5–5.1)
PROT SERPL-MCNC: 5.6 G/DL (ref 6.4–8.2)
RBC # BLD AUTO: 4.35 M/UL (ref 4.1–5.7)
SODIUM SERPL-SCNC: 141 MMOL/L (ref 136–145)
WBC # BLD AUTO: 7.3 K/UL (ref 4.1–11.1)

## 2024-06-25 PROCEDURE — 94669 MECHANICAL CHEST WALL OSCILL: CPT

## 2024-06-25 PROCEDURE — 1100000000 HC RM PRIVATE

## 2024-06-25 PROCEDURE — 84100 ASSAY OF PHOSPHORUS: CPT

## 2024-06-25 PROCEDURE — 6360000002 HC RX W HCPCS: Performed by: INTERNAL MEDICINE

## 2024-06-25 PROCEDURE — C1751 CATH, INF, PER/CENT/MIDLINE: HCPCS

## 2024-06-25 PROCEDURE — B543ZZA ULTRASONOGRAPHY OF RIGHT JUGULAR VEINS, GUIDANCE: ICD-10-PCS | Performed by: RADIOLOGY

## 2024-06-25 PROCEDURE — C9113 INJ PANTOPRAZOLE SODIUM, VIA: HCPCS | Performed by: INTERNAL MEDICINE

## 2024-06-25 PROCEDURE — 94761 N-INVAS EAR/PLS OXIMETRY MLT: CPT

## 2024-06-25 PROCEDURE — 2580000003 HC RX 258: Performed by: INTERNAL MEDICINE

## 2024-06-25 PROCEDURE — 83735 ASSAY OF MAGNESIUM: CPT

## 2024-06-25 PROCEDURE — 94668 MNPJ CHEST WALL SBSQ: CPT

## 2024-06-25 PROCEDURE — 36415 COLL VENOUS BLD VENIPUNCTURE: CPT

## 2024-06-25 PROCEDURE — 71045 X-RAY EXAM CHEST 1 VIEW: CPT

## 2024-06-25 PROCEDURE — 85025 COMPLETE CBC W/AUTO DIFF WBC: CPT

## 2024-06-25 PROCEDURE — 6370000000 HC RX 637 (ALT 250 FOR IP): Performed by: INTERNAL MEDICINE

## 2024-06-25 PROCEDURE — 80053 COMPREHEN METABOLIC PANEL: CPT

## 2024-06-25 PROCEDURE — 76937 US GUIDE VASCULAR ACCESS: CPT

## 2024-06-25 PROCEDURE — 05HM33Z INSERTION OF INFUSION DEVICE INTO RIGHT INTERNAL JUGULAR VEIN, PERCUTANEOUS APPROACH: ICD-10-PCS | Performed by: RADIOLOGY

## 2024-06-25 PROCEDURE — 2709999900 HC NON-CHARGEABLE SUPPLY

## 2024-06-25 PROCEDURE — 2580000003 HC RX 258: Performed by: HOSPITALIST

## 2024-06-25 PROCEDURE — 94640 AIRWAY INHALATION TREATMENT: CPT

## 2024-06-25 RX ORDER — DEXTROSE MONOHYDRATE AND SODIUM CHLORIDE 5; .9 G/100ML; G/100ML
INJECTION, SOLUTION INTRAVENOUS CONTINUOUS
Status: DISCONTINUED | OUTPATIENT
Start: 2024-06-25 | End: 2024-06-26

## 2024-06-25 RX ADMIN — SODIUM CHLORIDE: 9 INJECTION, SOLUTION INTRAVENOUS at 04:06

## 2024-06-25 RX ADMIN — DEXTROSE AND SODIUM CHLORIDE: 5; 900 INJECTION, SOLUTION INTRAVENOUS at 21:20

## 2024-06-25 RX ADMIN — SODIUM CHLORIDE, PRESERVATIVE FREE 10 ML: 5 INJECTION INTRAVENOUS at 08:46

## 2024-06-25 RX ADMIN — LORAZEPAM 0.5 MG: 2 INJECTION, SOLUTION INTRAMUSCULAR; INTRAVENOUS at 04:47

## 2024-06-25 RX ADMIN — IPRATROPIUM BROMIDE AND ALBUTEROL SULFATE 1 DOSE: .5; 3 SOLUTION RESPIRATORY (INHALATION) at 07:11

## 2024-06-25 RX ADMIN — IPRATROPIUM BROMIDE AND ALBUTEROL SULFATE 1 DOSE: .5; 3 SOLUTION RESPIRATORY (INHALATION) at 19:47

## 2024-06-25 RX ADMIN — IPRATROPIUM BROMIDE 2 SPRAY: 21 SPRAY NASAL at 08:48

## 2024-06-25 RX ADMIN — AZELASTINE HYDROCHLORIDE 2 SPRAY: 137 SPRAY, METERED NASAL at 21:13

## 2024-06-25 RX ADMIN — IPRATROPIUM BROMIDE AND ALBUTEROL SULFATE 1 DOSE: .5; 3 SOLUTION RESPIRATORY (INHALATION) at 13:22

## 2024-06-25 RX ADMIN — SODIUM CHLORIDE, PRESERVATIVE FREE 40 MG: 5 INJECTION INTRAVENOUS at 21:11

## 2024-06-25 RX ADMIN — SODIUM CHLORIDE, PRESERVATIVE FREE 40 MG: 5 INJECTION INTRAVENOUS at 08:46

## 2024-06-25 RX ADMIN — AZELASTINE HYDROCHLORIDE 2 SPRAY: 137 SPRAY, METERED NASAL at 08:48

## 2024-06-25 RX ADMIN — IPRATROPIUM BROMIDE 2 SPRAY: 21 SPRAY NASAL at 21:25

## 2024-06-25 ASSESSMENT — PAIN SCALES - WONG BAKER
WONGBAKER_NUMERICALRESPONSE: NO HURT
WONGBAKER_NUMERICALRESPONSE: NO HURT

## 2024-06-25 NOTE — PLAN OF CARE
Problem: Discharge Planning  Goal: Discharge to home or other facility with appropriate resources  6/25/2024 1013 by Nilam Mcintyre RN  Outcome: Progressing  6/25/2024 0235 by Kate Sy RN  Outcome: Progressing     Problem: Safety - Adult  Goal: Free from fall injury  6/25/2024 1013 by Nilam Mcintyre RN  Outcome: Progressing  6/25/2024 0235 by Kate Sy RN  Outcome: Progressing     Problem: Nutrition Deficit:  Goal: Optimize nutritional status  6/25/2024 1013 by Nilam Mcintyre RN  Outcome: Progressing  6/25/2024 0235 by Kate Sy RN  Outcome: Progressing  Flowsheets (Taken 6/24/2024 1309 by Kell Lu, RD)  Nutrient intake appropriate for improving, restoring, or maintaining nutritional needs: Recommend, monitor, and adjust tube feedings and TPN/PPN based on assessed needs     Problem: Pain  Goal: Verbalizes/displays adequate comfort level or baseline comfort level  6/25/2024 1013 by Nilam Mcintyre RN  Outcome: Progressing  6/25/2024 0235 by Kate Sy RN  Outcome: Progressing     Problem: Skin/Tissue Integrity  Goal: Absence of new skin breakdown  Description: 1.  Monitor for areas of redness and/or skin breakdown  2.  Assess vascular access sites hourly  3.  Every 4-6 hours minimum:  Change oxygen saturation probe site  4.  Every 4-6 hours:  If on nasal continuous positive airway pressure, respiratory therapy assess nares and determine need for appliance change or resting period.  6/25/2024 1013 by Nilam Mcintyre RN  Outcome: Progressing  6/25/2024 0235 by Kate Sy RN  Outcome: Progressing     Problem: Respiratory - Adult  Goal: Achieves optimal ventilation and oxygenation  6/25/2024 1013 by Nilam Mcintyre RN  Outcome: Progressing  6/25/2024 0235 by Kate Sy RN  Outcome: Progressing     Problem: ABCDS Injury Assessment  Goal: Absence of physical injury  6/25/2024 1013 by Nilam Mcintyre RN  Outcome: Progressing  6/25/2024

## 2024-06-25 NOTE — CARE COORDINATION
6/25/2024 1:00 PM   Care Management Progress Note     Admitting diagnosis:  H/O cerebral palsy [Z86.69]  Sepsis (HCC) [A41.9]  Severe sepsis (HCC) [A41.9, R65.20]  Pneumonia of left lung due to infectious organism, unspecified part of lung [J18.9]     Admit Date:  6/12/2024  3:42 PM     RUR:  14%  Risk Level: [x]Low []Moderate []High     Transition of care plan:  Ongoing medical management  GI consulted, EGD today  Jtube placed, new tube feedings sent to [facility]   PT and OT consulted: No  Anticipated discharge plan: Return to [facility] with home health through [facility] and tube feedings through [facility]  Outpatient follow-up.  Discharge transport: Medicaid stretch

## 2024-06-25 NOTE — PERIOP NOTE
TRANSFER - IN REPORT:    Verbal report received from Patricia RN on Ayden Simpson  being received from 429 for ordered procedure      Report consisted of patient's Situation, Background, Assessment and   Recommendations(SBAR).     Information from the following report(s) Alarm Parameters and Pre Procedure Checklist was reviewed with the receiving nurse.    Opportunity for questions and clarification was provided.      Assessment completed upon patient's arrival to unit and care assumed.

## 2024-06-25 NOTE — PLAN OF CARE
Problem: Safety - Adult  Goal: Free from fall injury  6/25/2024 0235 by Kate Sy RN  Outcome: Progressing  6/24/2024 1250 by Nilam Mcintyre RN  Outcome: Progressing     Problem: Nutrition Deficit:  Goal: Optimize nutritional status  6/25/2024 0235 by Kate Sy RN  Outcome: Progressing  Flowsheets (Taken 6/24/2024 1309 by Kell Lu, RD)  Nutrient intake appropriate for improving, restoring, or maintaining nutritional needs: Recommend, monitor, and adjust tube feedings and TPN/PPN based on assessed needs  6/24/2024 1250 by Nilam Mcintyre RN  Outcome: Progressing     Problem: Pain  Goal: Verbalizes/displays adequate comfort level or baseline comfort level  6/25/2024 0235 by Kate Sy RN  Outcome: Progressing  6/24/2024 1250 by Nilam Mcintyre RN  Outcome: Progressing     Problem: Respiratory - Adult  Goal: Achieves optimal ventilation and oxygenation  6/25/2024 0235 by Kate Sy RN  Outcome: Progressing  6/24/2024 1316 by Lety Loaiza RCP  Outcome: Progressing  6/24/2024 1250 by Nilam Mcintyre RN  Outcome: Progressing     Problem: ABCDS Injury Assessment  Goal: Absence of physical injury  6/25/2024 0235 by Kate Sy RN  Outcome: Progressing  6/24/2024 1250 by Nilam Mcintyre RN  Outcome: Progressing     Problem: Discharge Planning  Goal: Discharge to home or other facility with appropriate resources  6/25/2024 0235 by Kate Sy RN  Outcome: Progressing  6/24/2024 1250 by Nilam Mcintyre RN  Outcome: Progressing

## 2024-06-25 NOTE — PROGRESS NOTES
TRANSFER - OUT REPORT:    Verbal report given to Garrett RN(name) on Ayden Simpson being [facility] for routine progression of patient care       Report consisted of patient's Situation, Background, Assessment and   Recommendations(SBAR).     Information from the following report(s) Surgery Report was reviewed with the receiving nurse.  Notified that brown port is positional but still gets blood return and flushes well.    Opportunity for questions and clarification was provided.

## 2024-06-25 NOTE — PROGRESS NOTES
[facility]  [address] [place] [address] [phone]      Hospitalist Progress Note      NAME: Ayden Simpson   :  1965  MRM:  [id number]    Date of service: 2024  8:52 AM       Assessment and Plan:   Acute respiratory failure. Secondary to pneumonia with concern for aspiration. Completed IV antibiotics. Continue scheduled nebulizers.  Finished IV steroid.  Improved.  Off of oxygen.  Evaluated by pulm     2.  Concern for aspiration/reflux/vomiting/GERD. PEG exchange for J tube on .  Started on tube feeding per nutrition recommendation, but feeding tube malfunctioning.  Off PPN.  Continue PPI, antiemetics. Check electrolytes.  Evaluated by GI and general surgery. GI planned for repeat EGD with replacement of of the J-tube extension. Watch for refeeding syndrome when star feeding.     3.  Sepsis POA.  Resolved.  Due to pneumonia     4.  Pulmonary edema on chest x-ray.  Normal EF and wall motion.  Noted to have PFO. Severe right to left shunt was noted. Atrial septal aneurysm present. (discussed with care giver).        5.  Elevated LFTs.  Improved.  Possible related to antibiotics.  Ultrasound of the liver shows small gallstone otherwise unremarkable.  Hepatitis panel is negative.  Monitor     6.  Electrolyte abnormalities. Likely due to poor nutrition.  Replete as needed.  Evaluated by nutrition     7.  Cerebral palsy/ mood disorder. Continue home baclofen, risperidone, Remeron and ativan      8.  Hypotension.  Started on normal saline.  Bolus was given.  Added midodrine. Monitor            Subjective:     Chief Complaint:: Patient was seen and examined as a follow up for acute hypoxic respiratory failure.  Chart was reviewed.  Nonverbal.  No events    ROS:  (bold if positive, if negative)    Tolerating PT  Tolerating Diet        Objective:     Last 24hrs VS reviewed since prior progress note. Most recent are:    Vitals:    24 0719   BP: 105/63    Pulse: 82   Resp: 16   Temp: 98.1 °F (36.7 °C)   SpO2: 97%     SpO2 Readings from Last 6 Encounters:   06/25/24 97%          Intake/Output Summary (Last 24 hours) at 6/25/2024 0852  Last data filed at 6/25/2024 0619  Gross per 24 hour   Intake 808 ml   Output 1200 ml   Net -392 ml          Physical Exam:    Gen:  Well-developed, well-nourished, in no acute distress  HEENT:  Pink conjunctivae, PERRL, hearing intact to voice, moist mucous membranes  Neck:  Supple, without masses, thyroid non-tender  Resp:  No accessory muscle use, clear breath sounds without wheezes rales or rhonchi  Card:  No murmurs, normal S1, S2 without thrills, bruits or peripheral edema  Abd:  Soft, non-tender, non-distended, normoactive bowel sounds are present, no palpable organomegaly and no detectable hernias  Lymph:  No cervical or inguinal adenopathy  Musc: Contracted  Skin:  No rashes or ulcers, skin turgor is good  Neuro: Does not follow command  Psych: Poor insight  __________________________________________________________________  Medications Reviewed: (see below)  Medications:     Current Facility-Administered Medications   Medication Dose Route Frequency    midodrine (PROAMATINE) tablet 5 mg  5 mg PEG Tube TID WC    Viokace tube flush  1 Dose Per J Tube Q4H PRN    0.9 % sodium chloride infusion   IntraVENous Continuous    ipratropium 0.5 mg-albuterol 2.5 mg (DUONEB) nebulizer solution 1 Dose  1 Dose Inhalation Q6H WA RT    ondansetron (ZOFRAN) injection 4 mg  4 mg IntraVENous Q6H PRN    LORazepam (ATIVAN) tablet 2 mg  2 mg Per G Tube BID    LORazepam (ATIVAN) injection 0.5 mg  0.5 mg IntraVENous Q6H PRN    glycopyrrolate (ROBINUL) injection 0.1 mg  0.1 mg IntraVENous Q4H PRN    lactobacillus (CULTURELLE) capsule 1 capsule  1 capsule PEG Tube Daily with breakfast    sodium chloride nebulizer 0.9 % solution 3 mL  3 mL Nebulization Q4H PRN    racepinephrine HCl (VAPONEFPRIN) 2.25 % nebulizer solution NEBU 0.5 mL  0.5 mL Nebulization

## 2024-06-25 NOTE — PROGRESS NOTES
1115: Upon patients arrival to Endoscopy, IV site was red, swollen, and leaking. Patient's upper arm and shoulder appeared to be swollen and red. This RN stopped fluids, removed IV, cleaned site, and wrapped with gauze and cling wrap.   Spoke with Charge nurse Grace Charles and Dr. Winn, plan is to send patient back to inpatient room 429, and Dr. Winn will plan for IV access for patient. If successful, plan to do EGD with PEG and J tube revision this afternoon.   Sitter and patients HENRI Cartagena at bedside.

## 2024-06-26 ENCOUNTER — ANESTHESIA EVENT (OUTPATIENT)
Facility: HOSPITAL | Age: 59
End: 2024-06-26
Payer: MEDICAID

## 2024-06-26 ENCOUNTER — ANESTHESIA (OUTPATIENT)
Facility: HOSPITAL | Age: 59
End: 2024-06-26
Payer: MEDICAID

## 2024-06-26 LAB
ALBUMIN SERPL-MCNC: 3.5 G/DL (ref 3.5–5)
ALBUMIN/GLOB SERPL: 1.5 (ref 1.1–2.2)
ALP SERPL-CCNC: 73 U/L (ref 45–117)
ALT SERPL-CCNC: 60 U/L (ref 12–78)
ANION GAP SERPL CALC-SCNC: 6 MMOL/L (ref 5–15)
AST SERPL-CCNC: 16 U/L (ref 15–37)
BASOPHILS # BLD: 0 K/UL (ref 0–0.1)
BASOPHILS NFR BLD: 0 % (ref 0–1)
BILIRUB SERPL-MCNC: 1.1 MG/DL (ref 0.2–1)
BUN SERPL-MCNC: 9 MG/DL (ref 6–20)
BUN/CREAT SERPL: 16 (ref 12–20)
CALCIUM SERPL-MCNC: 8.5 MG/DL (ref 8.5–10.1)
CHLORIDE SERPL-SCNC: 109 MMOL/L (ref 97–108)
CO2 SERPL-SCNC: 25 MMOL/L (ref 21–32)
CREAT SERPL-MCNC: 0.58 MG/DL (ref 0.7–1.3)
CRP SERPL-MCNC: 0.7 MG/DL (ref 0–0.3)
DIFFERENTIAL METHOD BLD: ABNORMAL
EOSINOPHIL # BLD: 0.1 K/UL (ref 0–0.4)
EOSINOPHIL NFR BLD: 2 % (ref 0–7)
ERYTHROCYTE [DISTWIDTH] IN BLOOD BY AUTOMATED COUNT: 12.7 % (ref 11.5–14.5)
EST. AVERAGE GLUCOSE BLD GHB EST-MCNC: 97 MG/DL
GLOBULIN SER CALC-MCNC: 2.3 G/DL (ref 2–4)
GLUCOSE SERPL-MCNC: 87 MG/DL (ref 65–100)
HBA1C MFR BLD: 5 % (ref 4–5.6)
HCT VFR BLD AUTO: 37.3 % (ref 36.6–50.3)
HGB BLD-MCNC: 13 G/DL (ref 12.1–17)
IMM GRANULOCYTES # BLD AUTO: 0 K/UL (ref 0–0.04)
IMM GRANULOCYTES NFR BLD AUTO: 1 % (ref 0–0.5)
LYMPHOCYTES # BLD: 0.8 K/UL (ref 0.8–3.5)
LYMPHOCYTES NFR BLD: 14 % (ref 12–49)
MAGNESIUM SERPL-MCNC: 2.1 MG/DL (ref 1.6–2.4)
MCH RBC QN AUTO: 32.2 PG (ref 26–34)
MCHC RBC AUTO-ENTMCNC: 34.9 G/DL (ref 30–36.5)
MCV RBC AUTO: 92.3 FL (ref 80–99)
MONOCYTES # BLD: 0.7 K/UL (ref 0–1)
MONOCYTES NFR BLD: 12 % (ref 5–13)
NEUTS SEG # BLD: 4.4 K/UL (ref 1.8–8)
NEUTS SEG NFR BLD: 71 % (ref 32–75)
NRBC # BLD: 0 K/UL (ref 0–0.01)
NRBC BLD-RTO: 0 PER 100 WBC
PHOSPHATE SERPL-MCNC: 2.6 MG/DL (ref 2.6–4.7)
PLATELET # BLD AUTO: 188 K/UL (ref 150–400)
PMV BLD AUTO: 9.8 FL (ref 8.9–12.9)
POTASSIUM SERPL-SCNC: 3 MMOL/L (ref 3.5–5.1)
PROT SERPL-MCNC: 5.8 G/DL (ref 6.4–8.2)
RBC # BLD AUTO: 4.04 M/UL (ref 4.1–5.7)
SODIUM SERPL-SCNC: 140 MMOL/L (ref 136–145)
WBC # BLD AUTO: 6.1 K/UL (ref 4.1–11.1)

## 2024-06-26 PROCEDURE — 2500000003 HC RX 250 WO HCPCS: Performed by: INTERNAL MEDICINE

## 2024-06-26 PROCEDURE — C9113 INJ PANTOPRAZOLE SODIUM, VIA: HCPCS | Performed by: INTERNAL MEDICINE

## 2024-06-26 PROCEDURE — 6370000000 HC RX 637 (ALT 250 FOR IP): Performed by: INTERNAL MEDICINE

## 2024-06-26 PROCEDURE — 83735 ASSAY OF MAGNESIUM: CPT

## 2024-06-26 PROCEDURE — 80053 COMPREHEN METABOLIC PANEL: CPT

## 2024-06-26 PROCEDURE — 1100000000 HC RM PRIVATE

## 2024-06-26 PROCEDURE — C1889 IMPLANT/INSERT DEVICE, NOC: HCPCS | Performed by: INTERNAL MEDICINE

## 2024-06-26 PROCEDURE — 2580000003 HC RX 258: Performed by: INTERNAL MEDICINE

## 2024-06-26 PROCEDURE — 3700000001 HC ADD 15 MINUTES (ANESTHESIA): Performed by: INTERNAL MEDICINE

## 2024-06-26 PROCEDURE — 85025 COMPLETE CBC W/AUTO DIFF WBC: CPT

## 2024-06-26 PROCEDURE — 6370000000 HC RX 637 (ALT 250 FOR IP)

## 2024-06-26 PROCEDURE — 94640 AIRWAY INHALATION TREATMENT: CPT

## 2024-06-26 PROCEDURE — 3600007512: Performed by: INTERNAL MEDICINE

## 2024-06-26 PROCEDURE — 0D2DXUZ CHANGE FEEDING DEVICE IN LOWER INTESTINAL TRACT, EXTERNAL APPROACH: ICD-10-PCS | Performed by: INTERNAL MEDICINE

## 2024-06-26 PROCEDURE — 6370000000 HC RX 637 (ALT 250 FOR IP): Performed by: HOSPITALIST

## 2024-06-26 PROCEDURE — 86140 C-REACTIVE PROTEIN: CPT

## 2024-06-26 PROCEDURE — 36415 COLL VENOUS BLD VENIPUNCTURE: CPT

## 2024-06-26 PROCEDURE — 7100000011 HC PHASE II RECOVERY - ADDTL 15 MIN: Performed by: INTERNAL MEDICINE

## 2024-06-26 PROCEDURE — 84100 ASSAY OF PHOSPHORUS: CPT

## 2024-06-26 PROCEDURE — 2709999900 HC NON-CHARGEABLE SUPPLY: Performed by: INTERNAL MEDICINE

## 2024-06-26 PROCEDURE — 2500000003 HC RX 250 WO HCPCS: Performed by: NURSE ANESTHETIST, CERTIFIED REGISTERED

## 2024-06-26 PROCEDURE — 6360000002 HC RX W HCPCS: Performed by: INTERNAL MEDICINE

## 2024-06-26 PROCEDURE — 83036 HEMOGLOBIN GLYCOSYLATED A1C: CPT

## 2024-06-26 PROCEDURE — 2580000003 HC RX 258: Performed by: HOSPITALIST

## 2024-06-26 PROCEDURE — 3600007502: Performed by: INTERNAL MEDICINE

## 2024-06-26 PROCEDURE — 3700000000 HC ANESTHESIA ATTENDED CARE: Performed by: INTERNAL MEDICINE

## 2024-06-26 PROCEDURE — 94668 MNPJ CHEST WALL SBSQ: CPT

## 2024-06-26 PROCEDURE — 2720000010 HC SURG SUPPLY STERILE: Performed by: INTERNAL MEDICINE

## 2024-06-26 PROCEDURE — 6360000002 HC RX W HCPCS: Performed by: NURSE ANESTHETIST, CERTIFIED REGISTERED

## 2024-06-26 PROCEDURE — 94761 N-INVAS EAR/PLS OXIMETRY MLT: CPT

## 2024-06-26 PROCEDURE — 7100000010 HC PHASE II RECOVERY - FIRST 15 MIN: Performed by: INTERNAL MEDICINE

## 2024-06-26 DEVICE — WORKING LENGTH 235CM, WORKING CHANNEL 2.8MM
Type: IMPLANTABLE DEVICE | Site: JEJUNUM | Status: FUNCTIONAL
Brand: RESOLUTION 360 CLIP

## 2024-06-26 RX ORDER — SODIUM CHLORIDE 0.9 % (FLUSH) 0.9 %
5-40 SYRINGE (ML) INJECTION EVERY 12 HOURS SCHEDULED
Status: DISCONTINUED | OUTPATIENT
Start: 2024-06-26 | End: 2024-06-26 | Stop reason: HOSPADM

## 2024-06-26 RX ORDER — DEXMEDETOMIDINE HYDROCHLORIDE 100 UG/ML
INJECTION, SOLUTION INTRAVENOUS PRN
Status: DISCONTINUED | OUTPATIENT
Start: 2024-06-26 | End: 2024-06-26 | Stop reason: SDUPTHER

## 2024-06-26 RX ORDER — PROPOFOL 10 MG/ML
INJECTION, EMULSION INTRAVENOUS PRN
Status: DISCONTINUED | OUTPATIENT
Start: 2024-06-26 | End: 2024-06-26 | Stop reason: SDUPTHER

## 2024-06-26 RX ORDER — SODIUM CHLORIDE 0.9 % (FLUSH) 0.9 %
5-40 SYRINGE (ML) INJECTION PRN
Status: DISCONTINUED | OUTPATIENT
Start: 2024-06-26 | End: 2024-06-26 | Stop reason: HOSPADM

## 2024-06-26 RX ORDER — SODIUM CHLORIDE 9 MG/ML
25 INJECTION, SOLUTION INTRAVENOUS PRN
Status: DISCONTINUED | OUTPATIENT
Start: 2024-06-26 | End: 2024-06-26 | Stop reason: HOSPADM

## 2024-06-26 RX ORDER — DEXTROSE MONOHYDRATE, SODIUM CHLORIDE, AND POTASSIUM CHLORIDE 50; 2.98; 4.5 G/1000ML; G/1000ML; G/1000ML
INJECTION, SOLUTION INTRAVENOUS CONTINUOUS
Status: DISCONTINUED | OUTPATIENT
Start: 2024-06-26 | End: 2024-06-28 | Stop reason: HOSPADM

## 2024-06-26 RX ORDER — LIDOCAINE HYDROCHLORIDE 20 MG/ML
INJECTION, SOLUTION INTRAVENOUS PRN
Status: DISCONTINUED | OUTPATIENT
Start: 2024-06-26 | End: 2024-06-26 | Stop reason: SDUPTHER

## 2024-06-26 RX ADMIN — GLYCOPYRROLATE 1 MG: 1 TABLET ORAL at 09:32

## 2024-06-26 RX ADMIN — GUAIFENESIN 400 MG: 200 SOLUTION ORAL at 09:31

## 2024-06-26 RX ADMIN — GLYCOPYRROLATE 1 MG: 1 TABLET ORAL at 14:38

## 2024-06-26 RX ADMIN — RISPERIDONE 1 MG: 1 SOLUTION ORAL at 14:38

## 2024-06-26 RX ADMIN — MIDODRINE HYDROCHLORIDE 5 MG: 5 TABLET ORAL at 09:32

## 2024-06-26 RX ADMIN — PROPOFOL 20 MG: 10 INJECTION, EMULSION INTRAVENOUS at 12:00

## 2024-06-26 RX ADMIN — MIDODRINE HYDROCHLORIDE 5 MG: 5 TABLET ORAL at 17:11

## 2024-06-26 RX ADMIN — IPRATROPIUM BROMIDE AND ALBUTEROL SULFATE 1 DOSE: .5; 3 SOLUTION RESPIRATORY (INHALATION) at 07:08

## 2024-06-26 RX ADMIN — IPRATROPIUM BROMIDE 2 SPRAY: 21 SPRAY NASAL at 14:51

## 2024-06-26 RX ADMIN — IPRATROPIUM BROMIDE AND ALBUTEROL SULFATE 1 DOSE: .5; 3 SOLUTION RESPIRATORY (INHALATION) at 19:44

## 2024-06-26 RX ADMIN — PROPOFOL 10 MG: 10 INJECTION, EMULSION INTRAVENOUS at 12:17

## 2024-06-26 RX ADMIN — Medication 1 CAPSULE: at 09:32

## 2024-06-26 RX ADMIN — SODIUM CHLORIDE, PRESERVATIVE FREE 10 ML: 5 INJECTION INTRAVENOUS at 09:45

## 2024-06-26 RX ADMIN — SODIUM CHLORIDE: 9 INJECTION, SOLUTION INTRAVENOUS at 11:50

## 2024-06-26 RX ADMIN — PROPOFOL 10 MG: 10 INJECTION, EMULSION INTRAVENOUS at 12:10

## 2024-06-26 RX ADMIN — DEXMEDETOMIDINE 6 MCG: 100 INJECTION, SOLUTION INTRAVENOUS at 11:52

## 2024-06-26 RX ADMIN — CETIRIZINE HYDROCHLORIDE 5 MG: 10 TABLET, FILM COATED ORAL at 09:32

## 2024-06-26 RX ADMIN — AZELASTINE HYDROCHLORIDE 2 SPRAY: 137 SPRAY, METERED NASAL at 14:51

## 2024-06-26 RX ADMIN — PROPOFOL 10 MG: 10 INJECTION, EMULSION INTRAVENOUS at 12:23

## 2024-06-26 RX ADMIN — LIDOCAINE HYDROCHLORIDE 20 MG: 20 INJECTION, SOLUTION INTRAVENOUS at 11:55

## 2024-06-26 RX ADMIN — RISPERIDONE 1 MG: 1 SOLUTION ORAL at 23:32

## 2024-06-26 RX ADMIN — GUAIFENESIN 400 MG: 200 SOLUTION ORAL at 23:09

## 2024-06-26 RX ADMIN — POTASSIUM CHLORIDE, DEXTROSE MONOHYDRATE AND SODIUM CHLORIDE: 300; 5; 450 INJECTION, SOLUTION INTRAVENOUS at 17:05

## 2024-06-26 RX ADMIN — PROPOFOL 30 MG: 10 INJECTION, EMULSION INTRAVENOUS at 11:55

## 2024-06-26 RX ADMIN — LORAZEPAM 2 MG: 1 TABLET ORAL at 23:10

## 2024-06-26 RX ADMIN — AZELASTINE HYDROCHLORIDE 2 SPRAY: 137 SPRAY, METERED NASAL at 23:11

## 2024-06-26 RX ADMIN — BACLOFEN 10 MG: 10 TABLET ORAL at 23:09

## 2024-06-26 RX ADMIN — PROPOFOL 10 MG: 10 INJECTION, EMULSION INTRAVENOUS at 12:05

## 2024-06-26 RX ADMIN — IPRATROPIUM BROMIDE 2 SPRAY: 21 SPRAY NASAL at 23:12

## 2024-06-26 RX ADMIN — LORAZEPAM 2 MG: 1 TABLET ORAL at 09:32

## 2024-06-26 RX ADMIN — SODIUM CHLORIDE, PRESERVATIVE FREE 40 MG: 5 INJECTION INTRAVENOUS at 23:08

## 2024-06-26 RX ADMIN — MIRTAZAPINE 15 MG: 15 TABLET, FILM COATED ORAL at 23:09

## 2024-06-26 RX ADMIN — BACLOFEN 10 MG: 10 TABLET ORAL at 14:40

## 2024-06-26 RX ADMIN — PROPOFOL 10 MG: 10 INJECTION, EMULSION INTRAVENOUS at 12:28

## 2024-06-26 RX ADMIN — BACLOFEN 10 MG: 10 TABLET ORAL at 09:31

## 2024-06-26 RX ADMIN — Medication 300 MG: at 14:38

## 2024-06-26 RX ADMIN — GLYCOPYRROLATE 1 MG: 1 TABLET ORAL at 23:32

## 2024-06-26 ASSESSMENT — PAIN SCALES - WONG BAKER
WONGBAKER_NUMERICALRESPONSE: NO HURT

## 2024-06-26 ASSESSMENT — PAIN SCALES - GENERAL
PAINLEVEL_OUTOF10: 0

## 2024-06-26 ASSESSMENT — PAIN - FUNCTIONAL ASSESSMENT: PAIN_FUNCTIONAL_ASSESSMENT: ADULT NONVERBAL PAIN SCALE (NPVS)

## 2024-06-26 NOTE — PROGRESS NOTES
[facility]  [address] [place] [address] [phone]        Hospitalist Progress Note      NAME: Ayden Simpson   :  1965  MRM:  [id number]    Date/Time of service: 2024  3:09 PM       Subjective:     Chief Complaint:  Patient was personally seen and examined by me during this time period.  Chart reviewed.  Awaiting J-tube revision.  No events overnight       Objective:       Vitals:       Last 24hrs VS reviewed since prior progress note. Most recent are:    Vitals:    24 1325   BP: 130/83   Pulse: 65   Resp: 22   Temp: 98.6 °F (37 °C)   SpO2: 96%     SpO2 Readings from Last 6 Encounters:   24 96%          Intake/Output Summary (Last 24 hours) at 2024 1509  Last data filed at 2024 1230  Gross per 24 hour   Intake 150 ml   Output 1300 ml   Net -1150 ml        Exam:     Physical Exam:    Gen:  frail, ill-appearing, NAD  HEENT:  Pink conjunctivae, PERRL, hearing intact to voice, dry mucous membranes  Neck:  Supple, without masses, thyroid non-tender  Resp:  No accessory muscle use, clear breath sounds without wheezes rales or rhonchi  Card:  No murmurs, normal S1, S2 without thrills, bruits or peripheral edema  Abd:  Soft, non-tender, non-distended, normoactive bowel sounds are present  Musc:  contracted   Skin:  No rashes  Neuro:  moves all ext  Psych:  no insight     Medications Reviewed: (see below)    Lab Data Reviewed: (see below)    ______________________________________________________________________    Medications:     Current Facility-Administered Medications   Medication Dose Route Frequency    dextrose 5 % and 0.45 % NaCl with KCl 40 mEq infusion   IntraVENous Continuous    sodium chloride flush 0.9 % injection 5-40 mL  5-40 mL IntraVENous 2 times per day    sodium chloride flush 0.9 % injection 5-40 mL  5-40 mL IntraVENous PRN    0.9 % sodium chloride infusion  25 mL IntraVENous PRN    midodrine (PROAMATINE) tablet 5 mg  5 mg

## 2024-06-26 NOTE — PERIOP NOTE
Received recovery report from anesthesia team, see anesthesia note. Abdomen remains soft and non-tender post-procedure. Pt has no complaints at this time and tolerated procedure well. Endoscope was pre-cleaned at the bedside by Radha Thapa RN immediately following procedure. Post recovery report given to Karen Daniels RN.

## 2024-06-26 NOTE — ANESTHESIA PRE PROCEDURE
Department of Anesthesiology  Preprocedure Note       Name:  Ayden Simpson   Age:  58 y.o.  :  1965                                          MRN:  [id number]         Date:  2024      Surgeon: Surgeon(s):  Dusty Davis MD    Procedure: Procedure(s):  ESOPHAGOGASTRODUODENOSCOPY with J tube revision, possible PEG/J replacement    Medications prior to admission:   Prior to Admission medications    Medication Sig Start Date End Date Taking? Authorizing Provider   LORazepam (ATIVAN) 2 MG/ML concentrated solution 1 mL by Per G Tube route 2 times daily. Max Daily Amount: 4 mg   Yes Provider, MD Terry   glycopyrrolate (CUVPOSA) 1 MG/5ML SOLN solution GIVE 5ML (1MG) VIA PEG TUBE EVERY DAY FOR EXCESSIVE SALIVA ** REORDER WHEN LOW ** 24   Elpidio Haq APRN - NP   vitamin D (CHOLECALCIFEROL) 50 MCG (2000 UT) TABS tablet TAKE ONE TABLET CRUSHED BY PEG TUBE DAILY FOR SUPPLEMENT 24   Elpidio Haq APRN - NP   risperiDONE (RISPERDAL) 1 MG/ML oral solution GIVE 1ML BY G-TUBE 3 TIMES A DAY FOR ANXIETY, LASHING OUT, KICKING, THROWING SELF OUT OF WHEELCHAIR AGGRESSIVELY, OR YELLING. 1/10/24   Elpidio Haq APRN - NP   baclofen (LIORESAL) 10 MG tablet TAKE ONE TABLET 3 TIMES A DAY CRUSHED PER PEG TUBE FOR MUSCLE SPASMS 23   Elpidio Haq APRN - NP   sucralfate (CARAFATE) 1 GM/10ML suspension TAKE 5 MLS PER PEG TUBE TWICE DAILY FOR GERD 10/3/23   Elpidio Haq APRN - NP   loratadine (CLARITIN) 10 MG tablet Take 1 tablet by mouth daily 23   Elpidio Haq APRN - NP   azelastine (ASTELIN) 0.1 % nasal spray 2 sprays by Nasal route 2 times daily Use in each nostril as directed 23   Elpidio Haq APRN - NP   OMEPRAZOLE PO 40 mg by Enteral route daily    Automatic Reconciliation, Ar   ferrous sulfate 220 (44 Fe) MG/5ML solution  23   Automatic Reconciliation, Ar   ipratropium (ATROVENT) 0.03 % nasal spray 2 sprays by Nasal route 2 times daily 3/30/22   Automatic

## 2024-06-26 NOTE — PLAN OF CARE
Problem: Discharge Planning  Goal: Discharge to home or other facility with appropriate resources  Outcome: Progressing  Flowsheets (Taken 6/26/2024 0716)  Discharge to home or other facility with appropriate resources:   Identify barriers to discharge with patient and caregiver   Arrange for needed discharge resources and transportation as appropriate   Identify discharge learning needs (meds, wound care, etc)   Refer to discharge planning if patient needs post-hospital services based on physician order or complex needs related to functional status, cognitive ability or social support system     Problem: Safety - Adult  Goal: Free from fall injury  Outcome: Progressing  Flowsheets (Taken 6/26/2024 1032 by Fay Brar, RN)  Free From Fall Injury:   Instruct family/caregiver on patient safety   Based on caregiver fall risk screen, instruct family/caregiver to ask for assistance with transferring infant if caregiver noted to have fall risk factors     Problem: Nutrition Deficit:  Goal: Optimize nutritional status  Outcome: Progressing     Problem: Pain  Goal: Verbalizes/displays adequate comfort level or baseline comfort level  Outcome: Progressing     Problem: Skin/Tissue Integrity  Goal: Absence of new skin breakdown  Description: 1.  Monitor for areas of redness and/or skin breakdown  2.  Assess vascular access sites hourly  3.  Every 4-6 hours minimum:  Change oxygen saturation probe site  4.  Every 4-6 hours:  If on nasal continuous positive airway pressure, respiratory therapy assess nares and determine need for appliance change or resting period.  Outcome: Progressing     Problem: Respiratory - Adult  Goal: Achieves optimal ventilation and oxygenation  6/26/2024 1836 by Olivia Coyle LPN  Outcome: Progressing  6/26/2024 1241 by Regina Matos, RT  Outcome: Progressing     Problem: ABCDS Injury Assessment  Goal: Absence of physical injury  Outcome: Progressing  Flowsheets (Taken 6/26/2024

## 2024-06-26 NOTE — PERIOP NOTE
TRANSFER - OUT REPORT:    Verbal report given to Iris 4th Floor RN on Ayden Simpson  being transferred to 4th Floor - Room 429 for routine progression of patient care       Report consisted of patient's Situation, Background, Assessment and   Recommendations(SBAR).     Information from the following report(s) Nurse Handoff Report, Index, Adult Overview, Surgery Report, Intake/Output, MAR, Med Rec Status, Cardiac Rhythm NSR, Neuro Assessment, and Event Log and that PEG/J was ready for use and tube feedings and medications could resume via gastric port, was reviewed with the receiving nurse.           Lines:   CVC  06/25/24 Right Internal jugular (Active)   $Central Line Insertion $ Yes 06/25/24 1428   Central Line Being Utilized Yes 06/26/24 1055   Criteria for Appropriate Use Limited/no vessel suitable for conventional peripheral access 06/26/24 1055   Site Assessment Intact;Other (Comment) 06/26/24 1055   Phlebitis Assessment No symptoms 06/26/24 1055   Infiltration Assessment 0 06/26/24 1055   Proximal Lumen Color/Status Brisk blood return;Normal saline locked 06/26/24 1055   Medial Lumen Status Brisk blood return;Normal saline locked 06/26/24 1055   Distal Lumen Color/Status Normal saline locked;Brisk blood return 06/26/24 1055   Line Care Cap changed;Connections checked and tightened 06/26/24 1055   Alcohol Cap Used Yes 06/26/24 1055   Date of Last Dressing Change 06/25/24 06/26/24 1055   Dressing Type Transparent w/CHG gel 06/26/24 1055   Dressing Status Intact;Old drainage noted 06/26/24 1055   Dressing Intervention New 06/25/24 1428        Opportunity for questions and clarification was provided.      Patient transported with:  Tech

## 2024-06-26 NOTE — ANESTHESIA POSTPROCEDURE EVALUATION
Department of Anesthesiology  Postprocedure Note    Patient: Ayden Simpson  MRN: [id number]  YOB: 1965  Date of evaluation: 6/26/2024    Procedure Summary       Date: 06/26/24 Room / Location: [place] [address] / [facility] ENDOSCOPY    Anesthesia Start: 1150 Anesthesia Stop: 1233    Procedures:       ESOPHAGOGASTRODUODENOSCOPY with J tube revision, possible PEG/J replacement (Upper GI Region)      REMOVAL PERCUTANEOUS ENDOSCOPIC GASTROSTOMY TUBE (Upper GI Region)      PERCUTANEOUS ENDOSCOPIC GASTROSTOMY TUBE PLACEMENT (Upper GI Region)      JEJUNOSTOMY TUBE INSERTION/CHANGE (Upper GI Region) Diagnosis:       Dysphagia, unspecified type      (Dysphagia, unspecified type [R13.10])    Surgeons: Dusty Davis MD Responsible Provider: Brandon Patel MD    Anesthesia Type: MAC ASA Status: 3            Anesthesia Type: No value filed.    Kai Phase I: Kai Score: 8    Kai Phase II: Kai Score: 8    Anesthesia Post Evaluation    Patient location during evaluation: PACU  Patient participation: complete - patient cannot participate  Level of consciousness: awake  Pain score: 0  Airway patency: patent  Nausea & Vomiting: no vomiting and no nausea  Cardiovascular status: hemodynamically stable  Respiratory status: room air  Hydration status: stable  There was medical reason for not using a multimodal analgesia pain management approach.    No notable events documented.

## 2024-06-26 NOTE — PROGRESS NOTES
Ayden Venturae  1965  [id number]    Situation:  Verbal report received from: Fay Hernandez,RN  Procedure: Procedure(s):  ESOPHAGOGASTRODUODENOSCOPY with J tube revision, possible PEG/J replacement  REMOVAL PERCUTANEOUS ENDOSCOPIC GASTROSTOMY TUBE  PERCUTANEOUS ENDOSCOPIC GASTROSTOMY TUBE PLACEMENT  JEJUNOSTOMY TUBE INSERTION/CHANGE    Background:    Preoperative diagnosis: Dysphagia, unspecified type [R13.10]    Postoperative diagnosis: * No post-op diagnosis entered *    :  Dr. Davis    Assistant(s): Circulator: Fay Brar, RN  Circulator Assist: Radha Thapa RN    Assessment:    Anesthesia gave intra-procedure sedation and medications, see anesthesia flow sheet chest pain    Intravenous fluids: NS@ KVO     Vital signs stable yes    Abdominal assessment: round and soft yes    Recommendation:  Discharge patient per MD order.  Return to floor-429  Family or Friend yes-caregiver  Permission to share finding with family or friend -caregiver

## 2024-06-26 NOTE — OP NOTE
.                         [facility]  Dusty Davis MD  [phone]      2024    Esophagogastroduodenoscopy (EGD) Procedure Note  Ayden Simpson  : 1965  [facility] Medical Record Number: [id number]      Indications:   .                         [facility]  Dusty Davis MD  [phone]      2024    Esophagogastroduodenoscopy (EGD) Procedure Note  Ayden Simpson  : 1965  [facility] Medical Record Number: [id number]      Indications:   PEG and J placement   Referring Physician:  Alisson Mandel APRN - CNP  Anesthesia/Sedation: Monitored anesthesia care, see separate note  Endoscopist:  Dusty Davis MD   Complications:  None  Estimated Blood Loss:  None    Permit:  The indications, risks, benefits and alternatives were reviewed with the patient or their decision maker who was provided an opportunity to ask questions and all questions were answered.  The specific risks of esophagogastroduodenoscopy with conscious sedation were reviewed, including but not limited to anesthetic complication, bleeding, adverse drug reaction, missed lesion, infection, IV site reactions, and intestinal perforation which would lead to the need for surgical repair.  Alternatives to EGD including radiographic imaging, observation without testing, or laboratory testing were reviewed as well as the limitations of those alternatives discussed.  After considering the options and having all their questions answered, the patient or their decision maker provided both verbal and written consent to proceed.       Procedure in Detail:  After obtaining informed consent, positioning of the patient in the left lateral decubitus position, and conduction of a pre-procedure pause or \"time out\" the endoscope was introduced into the mouth and advanced to the duodenum.  A careful inspection was made, and

## 2024-06-26 NOTE — PROGRESS NOTES
Dr. Winn sent a text this evening stating that this patient needed his central line pulled back according to the x-ray. I contacted IR concerning this patient and spoke to Dr. Mckeon.He stated that according to the X-ray it looked like proper placement and that it would be fine to run medication through. The IR physician stated that he would have IR come and see the patient in the morning as well ,as the patient is to have a procedure done and will need medication.

## 2024-06-26 NOTE — PERIOP NOTE
TRANSFER - IN REPORT:    Verbal report received from BOBBY Simmons on Ayden Simpson  being received from 4th Floor - Room 429 for routine progression of patient care      Report consisted of patient's Situation, Background, Assessment and   Recommendations(SBAR).     Information from the following report(s) Nurse Handoff Report, Index, Adult Overview, Procedure Verification, and Neuro Assessment was reviewed with the receiving nurse.    Opportunity for questions and clarification was provided.      Assessment completed upon patient's arrival to unit and care assumed.

## 2024-06-26 NOTE — H&P
.Pre-Endoscopy H&P Update  Chief complaint/HPI/ROS:  The indication for the procedure, the patient's history and the patient's current medications are reviewed prior to the procedure and that data is reported on the H&P to which this document is attached.  Any significant complaints with regard to organ systems will be noted, and if not mentioned then a review of systems is not contributory.  Past Medical History:   Diagnosis Date    Anxiety     Cerebral palsy (HCC)     Contracted, joint, multiple sites     upper extremities due to cerebral palsy    Developmental non-verbal disorder     Encephalopathy chronic     GERD (gastroesophageal reflux disease)     Hip dysplasia     Ill-defined condition     cerebral palsy    Intellectual disability     Mass of bladder     Microcephaly (HCC)     Muscle spasm     upper and lower extremities      Past Surgical History:   Procedure Laterality Date    IR NONTUNNELED VASCULAR CATHETER  2024    IR NONTUNNELED VASCULAR CATHETER 2024 [facility] CARDIAC CATH/EP/IR LAB    WI UNLISTED PROCEDURE ABDOMEN PERITONEUM & OMENTUM      peg    UPPER GASTROINTESTINAL ENDOSCOPY N/A 2024    ESOPHAGOGASTRODUODENOSCOPY performed by Dusty Davis MD at [facility] ENDOSCOPY    UPPER GASTROINTESTINAL ENDOSCOPY N/A 2024    ESOPHAGOGASTRODUODENOSCOPY JEJUNOSTOMY TUBE PLACEMENT performed by Dusty Davis MD at [facility] ENDOSCOPY     Social   Social History     Tobacco Use    Smoking status: Never    Smokeless tobacco: Never   Substance Use Topics    Alcohol use: Never      History reviewed. No pertinent family history.   No Known Allergies   Prior to Admission Medications   Prescriptions Last Dose Informant Patient Reported? Taking?   LORazepam (ATIVAN) 2 MG/ML concentrated solution   Yes Yes   Si mL by Per G Tube route 2 times daily. Max Daily Amount: 4 mg   OMEPRAZOLE PO   Yes No   Si mg by Enteral route daily   azelastine (ASTELIN) 0.1 % nasal spray   No No   Si sprays by Nasal route 2

## 2024-06-26 NOTE — CARE COORDINATION
6/26/2024 9:00 AM   Care Management Progress Note     Admitting diagnosis:  H/O cerebral palsy [Z86.69]  Sepsis (HCC) [A41.9]  Severe sepsis (HCC) [A41.9, R65.20]  Pneumonia of left lung due to infectious organism, unspecified part of lung [J18.9]     Admit Date:  6/12/2024  3:42 PM     RUR:  14%  Risk Level: [x]Low []Moderate []High     Transition of care plan:  Ongoing medical management  GI consulted   Jtube placed, new tube feedings sent to [facility]   PT and OT consulted: No  [facility] and tube feedings through [facility]  Outpatient follow-up.  Discharge transport: Medicaid stretcher

## 2024-06-27 LAB
ALBUMIN SERPL-MCNC: 3.5 G/DL (ref 3.5–5)
ALBUMIN/GLOB SERPL: 1.5 (ref 1.1–2.2)
ALP SERPL-CCNC: 78 U/L (ref 45–117)
ALT SERPL-CCNC: 55 U/L (ref 12–78)
ANION GAP SERPL CALC-SCNC: 5 MMOL/L (ref 5–15)
AST SERPL-CCNC: 17 U/L (ref 15–37)
BILIRUB SERPL-MCNC: 1.1 MG/DL (ref 0.2–1)
BUN SERPL-MCNC: 4 MG/DL (ref 6–20)
BUN/CREAT SERPL: 8 (ref 12–20)
CALCIUM SERPL-MCNC: 8.6 MG/DL (ref 8.5–10.1)
CHLORIDE SERPL-SCNC: 109 MMOL/L (ref 97–108)
CO2 SERPL-SCNC: 27 MMOL/L (ref 21–32)
CREAT SERPL-MCNC: 0.53 MG/DL (ref 0.7–1.3)
GLOBULIN SER CALC-MCNC: 2.4 G/DL (ref 2–4)
GLUCOSE BLD STRIP.AUTO-MCNC: 103 MG/DL (ref 65–117)
GLUCOSE SERPL-MCNC: 99 MG/DL (ref 65–100)
MAGNESIUM SERPL-MCNC: 2 MG/DL (ref 1.6–2.4)
PHOSPHATE SERPL-MCNC: 2 MG/DL (ref 2.6–4.7)
POTASSIUM SERPL-SCNC: 3.2 MMOL/L (ref 3.5–5.1)
PROT SERPL-MCNC: 5.9 G/DL (ref 6.4–8.2)
SERVICE CMNT-IMP: NORMAL
SODIUM SERPL-SCNC: 141 MMOL/L (ref 136–145)

## 2024-06-27 PROCEDURE — 83735 ASSAY OF MAGNESIUM: CPT

## 2024-06-27 PROCEDURE — C9113 INJ PANTOPRAZOLE SODIUM, VIA: HCPCS | Performed by: INTERNAL MEDICINE

## 2024-06-27 PROCEDURE — 94640 AIRWAY INHALATION TREATMENT: CPT

## 2024-06-27 PROCEDURE — 94761 N-INVAS EAR/PLS OXIMETRY MLT: CPT

## 2024-06-27 PROCEDURE — 2580000003 HC RX 258: Performed by: HOSPITALIST

## 2024-06-27 PROCEDURE — 2500000003 HC RX 250 WO HCPCS: Performed by: INTERNAL MEDICINE

## 2024-06-27 PROCEDURE — 6370000000 HC RX 637 (ALT 250 FOR IP): Performed by: INTERNAL MEDICINE

## 2024-06-27 PROCEDURE — 1100000000 HC RM PRIVATE

## 2024-06-27 PROCEDURE — 80053 COMPREHEN METABOLIC PANEL: CPT

## 2024-06-27 PROCEDURE — 94762 N-INVAS EAR/PLS OXIMTRY CONT: CPT

## 2024-06-27 PROCEDURE — 84100 ASSAY OF PHOSPHORUS: CPT

## 2024-06-27 PROCEDURE — 6370000000 HC RX 637 (ALT 250 FOR IP)

## 2024-06-27 PROCEDURE — 94618 PULMONARY STRESS TESTING: CPT

## 2024-06-27 PROCEDURE — 6360000002 HC RX W HCPCS: Performed by: INTERNAL MEDICINE

## 2024-06-27 PROCEDURE — 2580000003 HC RX 258: Performed by: INTERNAL MEDICINE

## 2024-06-27 PROCEDURE — 6370000000 HC RX 637 (ALT 250 FOR IP): Performed by: HOSPITALIST

## 2024-06-27 PROCEDURE — 94669 MECHANICAL CHEST WALL OSCILL: CPT

## 2024-06-27 PROCEDURE — 82962 GLUCOSE BLOOD TEST: CPT

## 2024-06-27 PROCEDURE — 36415 COLL VENOUS BLD VENIPUNCTURE: CPT

## 2024-06-27 RX ADMIN — GUAIFENESIN 400 MG: 200 SOLUTION ORAL at 21:45

## 2024-06-27 RX ADMIN — IPRATROPIUM BROMIDE AND ALBUTEROL SULFATE 1 DOSE: .5; 3 SOLUTION RESPIRATORY (INHALATION) at 20:11

## 2024-06-27 RX ADMIN — POTASSIUM CHLORIDE, DEXTROSE MONOHYDRATE AND SODIUM CHLORIDE: 300; 5; 450 INJECTION, SOLUTION INTRAVENOUS at 10:20

## 2024-06-27 RX ADMIN — MIDODRINE HYDROCHLORIDE 5 MG: 5 TABLET ORAL at 11:23

## 2024-06-27 RX ADMIN — BACLOFEN 10 MG: 10 TABLET ORAL at 11:22

## 2024-06-27 RX ADMIN — SODIUM CHLORIDE, PRESERVATIVE FREE 40 MG: 5 INJECTION INTRAVENOUS at 21:45

## 2024-06-27 RX ADMIN — MIRTAZAPINE 15 MG: 15 TABLET, FILM COATED ORAL at 21:45

## 2024-06-27 RX ADMIN — MIDODRINE HYDROCHLORIDE 5 MG: 5 TABLET ORAL at 15:34

## 2024-06-27 RX ADMIN — BACLOFEN 10 MG: 10 TABLET ORAL at 21:45

## 2024-06-27 RX ADMIN — BACLOFEN 10 MG: 10 TABLET ORAL at 15:34

## 2024-06-27 RX ADMIN — IPRATROPIUM BROMIDE 2 SPRAY: 21 SPRAY NASAL at 11:21

## 2024-06-27 RX ADMIN — Medication 1 CAPSULE: at 11:39

## 2024-06-27 RX ADMIN — GLYCOPYRROLATE 1 MG: 1 TABLET ORAL at 15:34

## 2024-06-27 RX ADMIN — AZELASTINE HYDROCHLORIDE 2 SPRAY: 137 SPRAY, METERED NASAL at 21:45

## 2024-06-27 RX ADMIN — RISPERIDONE 1 MG: 1 SOLUTION ORAL at 11:24

## 2024-06-27 RX ADMIN — POTASSIUM PHOSPHATE, MONOBASIC POTASSIUM PHOSPHATE, DIBASIC 30 MMOL: 224; 236 INJECTION, SOLUTION, CONCENTRATE INTRAVENOUS at 12:04

## 2024-06-27 RX ADMIN — GUAIFENESIN 400 MG: 200 SOLUTION ORAL at 11:23

## 2024-06-27 RX ADMIN — GLYCOPYRROLATE 1 MG: 1 TABLET ORAL at 21:45

## 2024-06-27 RX ADMIN — SODIUM CHLORIDE, PRESERVATIVE FREE 40 MG: 5 INJECTION INTRAVENOUS at 11:23

## 2024-06-27 RX ADMIN — IPRATROPIUM BROMIDE AND ALBUTEROL SULFATE 1 DOSE: .5; 3 SOLUTION RESPIRATORY (INHALATION) at 07:31

## 2024-06-27 RX ADMIN — POTASSIUM CHLORIDE, DEXTROSE MONOHYDRATE AND SODIUM CHLORIDE: 300; 5; 450 INJECTION, SOLUTION INTRAVENOUS at 22:09

## 2024-06-27 RX ADMIN — SODIUM CHLORIDE, PRESERVATIVE FREE 10 ML: 5 INJECTION INTRAVENOUS at 11:25

## 2024-06-27 RX ADMIN — SODIUM CHLORIDE, PRESERVATIVE FREE 10 ML: 5 INJECTION INTRAVENOUS at 21:46

## 2024-06-27 RX ADMIN — LORAZEPAM 2 MG: 1 TABLET ORAL at 11:22

## 2024-06-27 RX ADMIN — IPRATROPIUM BROMIDE 2 SPRAY: 21 SPRAY NASAL at 21:46

## 2024-06-27 RX ADMIN — IPRATROPIUM BROMIDE AND ALBUTEROL SULFATE 1 DOSE: .5; 3 SOLUTION RESPIRATORY (INHALATION) at 14:18

## 2024-06-27 RX ADMIN — AZELASTINE HYDROCHLORIDE 2 SPRAY: 137 SPRAY, METERED NASAL at 11:20

## 2024-06-27 RX ADMIN — CETIRIZINE HYDROCHLORIDE 5 MG: 10 TABLET, FILM COATED ORAL at 11:22

## 2024-06-27 RX ADMIN — Medication 300 MG: at 11:22

## 2024-06-27 RX ADMIN — GLYCOPYRROLATE 1 MG: 1 TABLET ORAL at 11:22

## 2024-06-27 NOTE — PROGRESS NOTES
Comprehensive Nutrition Assessment    Type and Reason for Visit: Reassess    Nutrition Recommendations/Plan:   Continue  J-feedings as follows:   Start at 30 mL/hr, increase by 10 mL Q4 hours to goal of 50 mL, turn off for 6 hours daily    Formula: Ana Osborne Peptide 1.5 Plain  GOAL TF: 50 mL x 18 hours daily (total volume goal = ~875 mL/d)       2. Free Water Flush 60 mL Q2 hours around the clock or total volume ~720 mL/day (some may be given via G-tube as appropriate/needed)    3. Resume/Begin daily children's multi-vitamin via G-tube to meet micronutrient needs     4. Keep HOB > 30 degrees at all times unless otherwise directed by a physician.     5. Order Viokase PRN up to 4 times daily for clogged feeding tube      Tube Feeding GOAL: Ana Farms Peptide 1.5 Plain 875 mL/day + 720 ml free water daily   provides: 1312 kcal, 65 g protein, 120 g carbohydrate, 67 g fat, 13 g fiber, 615 mL of free water + 720 flushes = ~1335 mL of free water daily.     Malnutrition Assessment:  Malnutrition Status:  Moderate malnutrition (06/21/24 1554)    Context:  Acute Illness     Findings of the 6 clinical characteristics of malnutrition:  Energy Intake:  75% or less of estimated energy requirements for 7 or more days  Weight Loss:  Greater than 2% over 1 week     Body Fat Loss:  Unable to assess (due to CP)     Muscle Mass Loss:  Unable to assess (due to CP)    Fluid Accumulation:  No significant fluid accumulation     Strength:  Not Performed     Nutrition Assessment:    58 year old Male admitted with H/O cerebral palsy [Z86.69]  Sepsis (HCC) [A41.9]  Severe sepsis (HCC) [A41.9, R65.20]  Pneumonia of left lung due to infectious organism, unspecified part of lung [J18.9] who has a past medical history of Anxiety, Cerebral palsy (HCC), Contracted, joint, multiple sites, Developmental non-verbal disorder, Encephalopathy chronic, GERD (gastroesophageal reflux disease), Hip dysplasia, Ill-defined condition, Intellectual

## 2024-06-27 NOTE — PROGRESS NOTES
Ultrasound IV by Jennifer Estrella RN :  Procedure Note    Ultrasound IV education provided to patient. Opportunities for questions given.     Ultrasound used for PIV placement:  20 gauge 8 cm PowerGlide Pro 8cm  right basilic} location.  1 X Attempt(s).    Vigorous blood return present and saline flushes with ease.     Procedure tolerated well. Primary RN aware of IV placement and added to LDA.      Starr Bustos RN

## 2024-06-27 NOTE — PROGRESS NOTES
UPDATED appointment    Hosptial pulmonary follow up set for Wednesday July 3rd 2024 at 9:15 AM with Doctor Kam at their [facility] location. Office asks that patient arrive 15-20 mins early for check in, brings picture ID , any insurance cards and list of medications.    -Pretty Martins  Case Management Specialist

## 2024-06-27 NOTE — PROGRESS NOTES
06/27/24 1347   Resting (Room Air)   SpO2 98   HR 89   After Walk   Comments pt with CP.  unable to ambulate.  O2 sat documented on room air in bed.  O2 sat >96%.   Does the Patient Qualify for Home O2 No

## 2024-06-27 NOTE — PROGRESS NOTES
[facility]  [address] [place] [address] [phone]        Hospitalist Progress Note      NAME: Ayden Simpson   :  1965  MRM:  [id number]    Date/Time of service: 2024  10:59 AM       Subjective:     Chief Complaint:  Patient was personally seen and examined by me during this time period.  Chart reviewed.  No fevers, chills.  Spoke to care taker at bedside       Objective:       Vitals:       Last 24hrs VS reviewed since prior progress note. Most recent are:    Vitals:    24 0752   BP: (!) 113/54   Pulse: 81   Resp: 15   Temp: 98.1 °F (36.7 °C)   SpO2: 95%     SpO2 Readings from Last 6 Encounters:   24 95%          Intake/Output Summary (Last 24 hours) at 2024 1059  Last data filed at 2024 1033  Gross per 24 hour   Intake 210 ml   Output 2050 ml   Net -1840 ml          Exam:     Physical Exam:    Gen:  frail, ill-appearing, NAD  HEENT:  Pink conjunctivae, PERRL, hearing intact to voice, dry mucous membranes  Neck:  Supple, without masses, thyroid non-tender  Resp:  No accessory muscle use, clear breath sounds without wheezes rales or rhonchi  Card:  No murmurs, normal S1, S2 without thrills, bruits or peripheral edema  Abd:  Soft, non-tender, non-distended, normoactive bowel sounds are present, has J-tube  Musc:  contracted   Skin:  No rashes  Neuro:  moves all ext  Psych:  no insight     Medications Reviewed: (see below)    Lab Data Reviewed: (see below)    ______________________________________________________________________    Medications:     Current Facility-Administered Medications   Medication Dose Route Frequency    potassium phosphate 30 mmol in sodium chloride 0.9 % 500 mL IVPB  30 mmol IntraVENous Once    dextrose 5 % and 0.45 % NaCl with KCl 40 mEq infusion   IntraVENous Continuous    midodrine (PROAMATINE) tablet 5 mg  5 mg PEG Tube TID WC    Viokace tube flush  1 Dose Per J Tube Q4H PRN    ipratropium 0.5 mg-albuterol

## 2024-06-27 NOTE — CARE COORDINATION
6/27/2024 3:25 PM Nebulizer approved through [facility]. CM relayed to pt's group home owner as well as new Pulm appt on 7/3.     6/27/2024 1:44 PM Nebulizer order received, spoke with pt's group home owner, Mitzi, preference for DME provider is [facility].   [facility]ebulizer [facility]   Mitzi requesting pt to be evaluated for home O2. CM relayed to pt's Attending.  CM also relayed to Mitzi pt's Pulm appt scheduled for 7/1. Mitzi requested this appt be rescheduled as she will not be able to take pt on this date. CM relayed to CM Specialist.   CM will follow.   GENARO Pichardo    6/27/2024 8:38 AM   Care Management Progress Note     Admitting diagnosis:  H/O cerebral palsy [Z86.69]  Sepsis (HCC) [A41.9]  Severe sepsis (HCC) [A41.9, R65.20]  Pneumonia of left lung due to infectious organism, unspecified part of lung [J18.9]     Admit Date:  6/12/2024  3:42 PM     RUR:  14%  Risk Level: [x]Low []Moderate []High     Transition of care plan:  Ongoing medical management  GI consulted   Jtube placed, [facility] tube feedings sent to [facility]   PT and OT consulted: No  Anticipated discharge plan: Return to [facility] and [facility] feedings through [facility]  Nebulizer approved through [facility], will need to be delivered prior to discharge   Outpatient follow-up.  Discharge transport: Medicaid stretcher

## 2024-06-28 VITALS
HEART RATE: 81 BPM | BODY MASS INDEX: 19.69 KG/M2 | RESPIRATION RATE: 16 BRPM | DIASTOLIC BLOOD PRESSURE: 70 MMHG | WEIGHT: 100.31 LBS | TEMPERATURE: 98.6 F | HEIGHT: 60 IN | SYSTOLIC BLOOD PRESSURE: 96 MMHG | OXYGEN SATURATION: 97 %

## 2024-06-28 PROBLEM — J69.0 ASPIRATION PNEUMONIA (HCC): Status: ACTIVE | Noted: 2024-06-28

## 2024-06-28 LAB
AMMONIA PLAS-SCNC: 30 UMOL/L
ANION GAP SERPL CALC-SCNC: 6 MMOL/L (ref 5–15)
BUN SERPL-MCNC: 9 MG/DL (ref 6–20)
BUN/CREAT SERPL: 16 (ref 12–20)
CALCIUM SERPL-MCNC: 8.5 MG/DL (ref 8.5–10.1)
CHLORIDE SERPL-SCNC: 110 MMOL/L (ref 97–108)
CO2 SERPL-SCNC: 24 MMOL/L (ref 21–32)
CREAT SERPL-MCNC: 0.58 MG/DL (ref 0.7–1.3)
ERYTHROCYTE [DISTWIDTH] IN BLOOD BY AUTOMATED COUNT: 12.8 % (ref 11.5–14.5)
GLUCOSE SERPL-MCNC: 109 MG/DL (ref 65–100)
HCT VFR BLD AUTO: 40.5 % (ref 36.6–50.3)
HGB BLD-MCNC: 13.6 G/DL (ref 12.1–17)
MAGNESIUM SERPL-MCNC: 1.9 MG/DL (ref 1.6–2.4)
MCH RBC QN AUTO: 31.7 PG (ref 26–34)
MCHC RBC AUTO-ENTMCNC: 33.6 G/DL (ref 30–36.5)
MCV RBC AUTO: 94.4 FL (ref 80–99)
NRBC # BLD: 0 K/UL (ref 0–0.01)
NRBC BLD-RTO: 0 PER 100 WBC
PHOSPHATE SERPL-MCNC: 3 MG/DL (ref 2.6–4.7)
PLATELET # BLD AUTO: 188 K/UL (ref 150–400)
PMV BLD AUTO: 10.3 FL (ref 8.9–12.9)
POTASSIUM SERPL-SCNC: 4 MMOL/L (ref 3.5–5.1)
RBC # BLD AUTO: 4.29 M/UL (ref 4.1–5.7)
SODIUM SERPL-SCNC: 140 MMOL/L (ref 136–145)
TSH SERPL DL<=0.05 MIU/L-ACNC: 0.8 UIU/ML (ref 0.36–3.74)
WBC # BLD AUTO: 6.8 K/UL (ref 4.1–11.1)

## 2024-06-28 PROCEDURE — 6370000000 HC RX 637 (ALT 250 FOR IP): Performed by: HOSPITALIST

## 2024-06-28 PROCEDURE — 94640 AIRWAY INHALATION TREATMENT: CPT

## 2024-06-28 PROCEDURE — 6370000000 HC RX 637 (ALT 250 FOR IP): Performed by: INTERNAL MEDICINE

## 2024-06-28 PROCEDURE — 6360000002 HC RX W HCPCS: Performed by: INTERNAL MEDICINE

## 2024-06-28 PROCEDURE — 2500000003 HC RX 250 WO HCPCS: Performed by: INTERNAL MEDICINE

## 2024-06-28 PROCEDURE — 83735 ASSAY OF MAGNESIUM: CPT

## 2024-06-28 PROCEDURE — 80048 BASIC METABOLIC PNL TOTAL CA: CPT

## 2024-06-28 PROCEDURE — 94761 N-INVAS EAR/PLS OXIMETRY MLT: CPT

## 2024-06-28 PROCEDURE — 94669 MECHANICAL CHEST WALL OSCILL: CPT

## 2024-06-28 PROCEDURE — 2580000003 HC RX 258: Performed by: HOSPITALIST

## 2024-06-28 PROCEDURE — 36415 COLL VENOUS BLD VENIPUNCTURE: CPT

## 2024-06-28 PROCEDURE — C9113 INJ PANTOPRAZOLE SODIUM, VIA: HCPCS | Performed by: INTERNAL MEDICINE

## 2024-06-28 PROCEDURE — 84443 ASSAY THYROID STIM HORMONE: CPT

## 2024-06-28 PROCEDURE — 2580000003 HC RX 258: Performed by: INTERNAL MEDICINE

## 2024-06-28 PROCEDURE — 85027 COMPLETE CBC AUTOMATED: CPT

## 2024-06-28 PROCEDURE — 82140 ASSAY OF AMMONIA: CPT

## 2024-06-28 PROCEDURE — 84100 ASSAY OF PHOSPHORUS: CPT

## 2024-06-28 PROCEDURE — 2700000000 HC OXYGEN THERAPY PER DAY

## 2024-06-28 RX ORDER — MIDODRINE HYDROCHLORIDE 5 MG/1
5 TABLET ORAL
Qty: 90 TABLET | Refills: 1 | Status: SHIPPED | OUTPATIENT
Start: 2024-06-28

## 2024-06-28 RX ORDER — IPRATROPIUM BROMIDE AND ALBUTEROL SULFATE 2.5; .5 MG/3ML; MG/3ML
3 SOLUTION RESPIRATORY (INHALATION) EVERY 4 HOURS PRN
Qty: 360 ML | Refills: 1 | Status: SHIPPED | OUTPATIENT
Start: 2024-06-28

## 2024-06-28 RX ADMIN — CETIRIZINE HYDROCHLORIDE 5 MG: 10 TABLET, FILM COATED ORAL at 08:44

## 2024-06-28 RX ADMIN — SODIUM CHLORIDE, PRESERVATIVE FREE 40 MG: 5 INJECTION INTRAVENOUS at 08:44

## 2024-06-28 RX ADMIN — SODIUM CHLORIDE, PRESERVATIVE FREE 10 ML: 5 INJECTION INTRAVENOUS at 08:46

## 2024-06-28 RX ADMIN — AZELASTINE HYDROCHLORIDE 2 SPRAY: 137 SPRAY, METERED NASAL at 08:44

## 2024-06-28 RX ADMIN — BACLOFEN 10 MG: 10 TABLET ORAL at 08:44

## 2024-06-28 RX ADMIN — SODIUM CHLORIDE, PRESERVATIVE FREE 10 ML: 5 INJECTION INTRAVENOUS at 08:45

## 2024-06-28 RX ADMIN — IPRATROPIUM BROMIDE 2 SPRAY: 21 SPRAY NASAL at 08:44

## 2024-06-28 RX ADMIN — GUAIFENESIN 400 MG: 200 SOLUTION ORAL at 08:44

## 2024-06-28 RX ADMIN — IPRATROPIUM BROMIDE AND ALBUTEROL SULFATE 1 DOSE: .5; 3 SOLUTION RESPIRATORY (INHALATION) at 07:55

## 2024-06-28 RX ADMIN — Medication 300 MG: at 11:37

## 2024-06-28 RX ADMIN — Medication 1 CAPSULE: at 08:44

## 2024-06-28 RX ADMIN — GLYCOPYRROLATE 1 MG: 1 TABLET ORAL at 08:44

## 2024-06-28 RX ADMIN — MIDODRINE HYDROCHLORIDE 5 MG: 5 TABLET ORAL at 11:37

## 2024-06-28 RX ADMIN — MIDODRINE HYDROCHLORIDE 5 MG: 5 TABLET ORAL at 08:44

## 2024-06-28 NOTE — PALLIATIVE CARE DISCHARGE
Goals of Care/Treatment Preferences    The Palliative Medicine team was consulted as part of your/your loved one's care in the hospital. Our team is a supportive service; we strive to relieve suffering and improve quality of life.    We reviewed advance care planning information, which includes the following:  Primary Decision Maker: Alejandra Hicks - Legal Guardian  Confirm Advance Directive: None      We reviewed / discussed your code status as:   Code Status: Full Code     “Full Code” means perform CPR in the event of cardiac arrest.      “DNR” means do NOT perform CPR in the event of cardiac arrest.      “Partial Code” means you have specific preferences, please discuss with your healthcare team.      “No Order” means this issue was not addressed / resolved during your stay            Because of the importance of this information, we are providing you with a printed copy to share with other healthcare providers after this hospitalization is complete.

## 2024-06-28 NOTE — PROGRESS NOTES
Overnight oximetry ordered, overnight oximetry placed on patient. Unfortunately only 20 minutes of test was documented within the machine. Patient did not desaturate on the testing we obtained. Test results in .

## 2024-06-28 NOTE — DISCHARGE INSTRUCTIONS
HOSPITALIST DISCHARGE INSTRUCTIONS          NAME: Ayden Simpson   :  1965   MRN:  [id number]     Date/Time:  2024 9:52 AM    ADMIT DATE: 2024     DISCHARGE DATE: 2024     ADMITTING DIAGNOSIS:  Aspiration pneumonia, J-tube placement    DISCHARGE DIAGNOSIS:  same    MEDICATIONS:  See after visit summary       It is important that you take the medication exactly as they are prescribed.   Keep your medication in the bottles provided by the pharmacist and keep a list of the medication names, dosages, and times to be taken in your wallet.   Do not take other medications without consulting your doctor     Pain Management: per above medications    What to do at Home    Recommended diet:   tube feed    Recommended activity: activity as tolerated    1) Return to the hospital if you feel worse    2) If you experience any of the following symptoms then please call your primary care physician or return to the emergency room if you cannot get hold of your doctor:  Fever, chills, nausea, vomiting, diarrhea, change in mentation, falling, bleeding, shortness of breath, chest pain, severe headache, severe abdominal pain,     3) Follow up with your doctors as directed    4) Resume home tube feed as directed by Dietician     5) Do not give ativan if patient is too drowsy     Hospital tube feed orders:  Formula: Ana Farms Peptide 1.5 Plain  GOAL TF: 50 mL x 18 hours daily (total volume goal = ~875 mL/d)        2. Free Water Flush 60 mL Q2 hours around the clock or total volume ~720 mL/day (some may be given via G-tube as appropriate/needed)     3. Resume/Begin daily children's multi-vitamin via G-tube to meet micronutrient needs     Follow Up:  Nikolas Kam MD  [address] [place] [address] [phone]    Follow up  Hosptial pulmonary follow up set for  at 9:15 AM with Doctor Kam at their [facility]. Office asks that patient arrive 15-20 mins early for  check in, brings picture ID , any insurance cards and list of medications.    -Pretty Martins  Case Management Specialist    [facility]  [address]  [address]  [facility] [address] [phone]  Follow up      [facility]  [phone]  [address] [facility] [address] [facility] [address]  [address]  [place], [place] [address]  Follow up      Tequila Alisson, APRN - CNP  [address]  [address]  [facility] [address] [phone]    Schedule an appointment as soon as possible for a visit in 1 week(s)    .      Information obtained by :  I understand that if any problems occur once I am at home I am to contact my physician.    I understand and acknowledge receipt of the instructions indicated above.                                                                                                                                           Physician's or R.N.'s Signature                                                                  Date/Time                                                                                                                                              Patient or Representative Signature                                                          Date/Time         Learning About Living With a Feeding Tube  What is tube feeding?  Your body needs nutrition to stay strong and help you live a healthy life. If you're unable to eat, or if you have an illness that makes it hard to swallow food, you may need a feeding tube. The tube is placed in the stomach and is used to give food, liquids, and medicines.  Depending on why you need a feeding tube, you may have it for several weeks or months or longer.  When you first get a feeding tube, your biggest challenge may be your new relationship with food. For many people, eating and savoring food is one of the most pleasing parts of daily life. You may grieve the loss of the daily habit of eating and the social aspects of sharing food with others.  If you've

## 2024-06-28 NOTE — PROGRESS NOTES
Nutrition Education    Educated on Tube Feeding daily volume goals  Learners: Patient and Caregiver  Readiness: Eager  Method: Explanation and instructions written on discharge paperwork  Response: Verbalizes Understanding and Demonstrated Understanding  Contact name and number provided.    Provided details of home tube feeding and water volume goals with caregiver and patient. Permission to share mailing address and caregiver's phone number with [facility] for patient to receive samples of new product at home to fill any possible gaps in tube feeding product needs.     IRIS BARCENAS RD, MS  Contact via office [phone]

## 2024-06-28 NOTE — DISCHARGE SUMMARY
RITO VALENZUELA [facility]  [address] [place] [address] [phone]          Hospitalist Discharge Summary     Patient ID:  Ayden Simpson  [id number]  58 y.o.  1965    Admit date: 6/12/2024    Discharge date and time: 6/28/2024 9:54 AM    Admission Diagnoses: H/O cerebral palsy [Z86.69]  Sepsis (HCC) [A41.9]  Severe sepsis (HCC) [A41.9, R65.20]  Pneumonia of left lung due to infectious organism, unspecified part of lung [J18.9]    Discharge Diagnoses:  Principal Diagnosis Aspiration pneumonia (HCC)                                            Principal Problem:    Aspiration pneumonia (HCC)  Active Problems:    Sepsis (HCC)    Severe protein-energy malnutrition (HCC)  Resolved Problems:    * No resolved hospital problems. *         Hospital Course:     59 yo hx of cerebral palsy, presented w/ sepsis, resp failure, aspiration pneumonia     1) Severe pro-alvina malnutrition: due to aspiration, PEG was changed to J-tube on 06/21.  Revision of J-tube on 06/26 by GI.  Will cont tube feed per Dietician (Tube Feeding GOAL: [employer] Peptide 1.5 Plain 875 mL/day + 720 ml free water daily)     2) Acute resp failure/hypoxia: now resolved.  Due to aspiration pneumonia.  No need for home O2     3) Aspiration pneumonia: sepsis POA, now resolved.  S/p IV abx     4) PFO: incidental findings on echo.  Will need to see outpatient cardiology     5) Elevated LFTs: resolved. Likely from abx.  Abd U/S w/ small gallstones     6) Cerebral palsy: lives in group home.  Cont baclofen, risperidone, ativan     7) HypoK/phos/Mg: repleted IV    PCP: Alisson Mandel, APRN - CNP     Consults: GI    Significant Diagnostic Studies: PEG changed to J-tube     Discharge Exam:  Physical Exam:    Gen:  frail, ill-appearing, NAD  HEENT:  Pink conjunctivae, PERRL, hearing intact to voice, dry mucous membranes  Neck:  Supple, without masses, thyroid non-tender  Resp:  No accessory muscle use, clear breath sounds without wheezes

## 2024-06-28 NOTE — PLAN OF CARE
Problem: Discharge Planning  Goal: Discharge to home or other facility with appropriate resources  6/28/2024 0307 by Jose G Worthington RN  Outcome: Progressing  6/27/2024 2105 by Temi Carballo RN  Outcome: Progressing     Problem: Safety - Adult  Goal: Free from fall injury  6/28/2024 0307 by Jose G Worthington RN  Outcome: Progressing  6/27/2024 2105 by Temi Carballo RN  Outcome: Progressing     Problem: Nutrition Deficit:  Goal: Optimize nutritional status  6/28/2024 0307 by Jose G Worthington RN  Outcome: Progressing  6/27/2024 2105 by Temi Carballo RN  Outcome: Progressing  Flowsheets (Taken 6/27/2024 1115 by Kell Lu RD)  Nutrient intake appropriate for improving, restoring, or maintaining nutritional needs: Recommend, monitor, and adjust tube feedings and TPN/PPN based on assessed needs     Problem: Pain  Goal: Verbalizes/displays adequate comfort level or baseline comfort level  6/28/2024 0307 by Jose G Worthington RN  Outcome: Progressing  6/27/2024 2105 by Temi Carballo RN  Outcome: Progressing     Problem: Skin/Tissue Integrity  Goal: Absence of new skin breakdown  Description: 1.  Monitor for areas of redness and/or skin breakdown  2.  Assess vascular access sites hourly  3.  Every 4-6 hours minimum:  Change oxygen saturation probe site  4.  Every 4-6 hours:  If on nasal continuous positive airway pressure, respiratory therapy assess nares and determine need for appliance change or resting period.  6/28/2024 0307 by Jose G Worthington RN  Outcome: Progressing  6/27/2024 2105 by Temi Carballo RN  Outcome: Progressing     Problem: Respiratory - Adult  Goal: Achieves optimal ventilation and oxygenation  6/28/2024 0307 by Jose G Worthington RN  Outcome: Progressing  6/27/2024 2105 by Temi Carballo RN  Outcome: Progressing  6/27/2024 2015 by Holley King, RT  Outcome: Progressing  Flowsheets (Taken 6/27/2024 0752 by Temi Carballo RN)  Achieves optimal ventilation and oxygenation:   Assess for changes

## 2024-06-28 NOTE — CARE COORDINATION
6/28/2024 1:50 PM CM called to insurance transport, stretcher transport [facility].   CM called to [facility] to confirm transport, transport was arranged for 1:30PM.  Pt has been picked up. CM called and spoke with Mitzi who confirmed it is ok pt has left [facility]. Pt's RN provided transport with a packet.    6/28/2024  10:37 AM CM confirmed discharge with pt's group home owner, Mitzi. Nebulizer delivered to pt. Signed delivery ticket sent to [employer] via [employer].  CM notified Starr at [facility] pt will discharge home today. Pt's discharge summary was sent to [facility] via [employer].  [facility] was notified via EPIC referral pt will discharge home today.  Discussed transport with Mitzi, confirmed Medicaid stretcher and timing at 3PM.  CM updated pt's RN.   EDWIN PichardoW      Care Management Progress Note     Admitting diagnosis:  H/O cerebral palsy [Z86.69]  Sepsis (HCC) [A41.9]  Severe sepsis (HCC) [A41.9, R65.20]  Pneumonia of left lung due to infectious organism, unspecified part of lung [J18.9]     Admit Date:  6/12/2024  3:42 PM     RUR:  14%  Risk Level: [x]Low []Moderate []High     Transition of care plan:  Discharge today  Jtube placed, new tube feedings sent to [facility]   PT and OT consulted: No  Anticipated discharge plan: Return to [facility] with home health PT and RN through [facility] and [facility]  Nebulizer delivered   Outpatient follow-up.  Pulm follow up scheduled for 7/3/2024  GI consult scheduled with Group home ownerTina  Discharge transport: Medicaid stretcher([phone]), scheduled for 3PM today, reference # [id number].      06/28/24 1047   Discharge Planning   Type of Residence Group Home   Living Arrangements Other (Comment)   Current Services Prior To Admission Home Infusion   Potential Assistance Needed Home Care   DME Ordered? No   Potential Assistance Purchasing Medications

## 2024-06-29 ENCOUNTER — HOME CARE VISIT (OUTPATIENT)
Facility: HOME HEALTH | Age: 59
End: 2024-06-29
Payer: MEDICAID

## 2024-06-29 PROCEDURE — G0299 HHS/HOSPICE OF RN EA 15 MIN: HCPCS

## 2024-06-30 ENCOUNTER — HOME CARE VISIT (OUTPATIENT)
Facility: HOME HEALTH | Age: 59
End: 2024-06-30
Payer: MEDICAID

## 2024-06-30 VITALS
DIASTOLIC BLOOD PRESSURE: 70 MMHG | SYSTOLIC BLOOD PRESSURE: 107 MMHG | OXYGEN SATURATION: 95 % | HEART RATE: 85 BPM | TEMPERATURE: 98.4 F | RESPIRATION RATE: 16 BRPM

## 2024-06-30 VITALS
SYSTOLIC BLOOD PRESSURE: 86 MMHG | OXYGEN SATURATION: 94 % | RESPIRATION RATE: 18 BRPM | HEART RATE: 110 BPM | DIASTOLIC BLOOD PRESSURE: 60 MMHG | TEMPERATURE: 99 F

## 2024-06-30 PROCEDURE — G0151 HHCP-SERV OF PT,EA 15 MIN: HCPCS

## 2024-07-01 ENCOUNTER — HOSPITAL ENCOUNTER (EMERGENCY)
Facility: HOSPITAL | Age: 59
Discharge: HOME | End: 2024-07-02
Attending: EMERGENCY MEDICINE
Payer: MEDICAID

## 2024-07-01 ENCOUNTER — APPOINTMENT (OUTPATIENT)
Facility: HOSPITAL | Age: 59
End: 2024-07-01
Payer: MEDICAID

## 2024-07-01 DIAGNOSIS — R53.83 OTHER FATIGUE: Primary | ICD-10-CM

## 2024-07-01 PROCEDURE — 87636 SARSCOV2 & INF A&B AMP PRB: CPT

## 2024-07-01 PROCEDURE — 99285 EMERGENCY DEPT VISIT HI MDM: CPT

## 2024-07-01 PROCEDURE — 83690 ASSAY OF LIPASE: CPT

## 2024-07-01 PROCEDURE — 36415 COLL VENOUS BLD VENIPUNCTURE: CPT

## 2024-07-01 PROCEDURE — 85025 COMPLETE CBC W/AUTO DIFF WBC: CPT

## 2024-07-01 PROCEDURE — 2580000003 HC RX 258: Performed by: EMERGENCY MEDICINE

## 2024-07-01 PROCEDURE — 71045 X-RAY EXAM CHEST 1 VIEW: CPT

## 2024-07-01 PROCEDURE — 80053 COMPREHEN METABOLIC PANEL: CPT

## 2024-07-01 PROCEDURE — 51701 INSERT BLADDER CATHETER: CPT

## 2024-07-01 PROCEDURE — 96360 HYDRATION IV INFUSION INIT: CPT

## 2024-07-01 RX ORDER — 0.9 % SODIUM CHLORIDE 0.9 %
1000 INTRAVENOUS SOLUTION INTRAVENOUS ONCE
Status: COMPLETED | OUTPATIENT
Start: 2024-07-01 | End: 2024-07-02

## 2024-07-01 RX ADMIN — SODIUM CHLORIDE 1000 ML: 9 INJECTION, SOLUTION INTRAVENOUS at 23:53

## 2024-07-01 ASSESSMENT — PAIN - FUNCTIONAL ASSESSMENT: PAIN_FUNCTIONAL_ASSESSMENT: NONE - DENIES PAIN

## 2024-07-01 ASSESSMENT — LIFESTYLE VARIABLES
HOW OFTEN DO YOU HAVE A DRINK CONTAINING ALCOHOL: PATIENT UNABLE TO ANSWER
HOW MANY STANDARD DRINKS CONTAINING ALCOHOL DO YOU HAVE ON A TYPICAL DAY: PATIENT UNABLE TO ANSWER

## 2024-07-02 ENCOUNTER — HOME CARE VISIT (OUTPATIENT)
Dept: HOME HEALTH SERVICES | Facility: HOME HEALTH | Age: 59
End: 2024-07-02
Payer: MEDICAID

## 2024-07-02 ENCOUNTER — CLINICAL DOCUMENTATION (OUTPATIENT)
Age: 59
End: 2024-07-02

## 2024-07-02 VITALS
HEART RATE: 83 BPM | WEIGHT: 89 LBS | TEMPERATURE: 97.6 F | HEIGHT: 60 IN | BODY MASS INDEX: 17.47 KG/M2 | OXYGEN SATURATION: 96 % | SYSTOLIC BLOOD PRESSURE: 98 MMHG | DIASTOLIC BLOOD PRESSURE: 73 MMHG | RESPIRATION RATE: 18 BRPM

## 2024-07-02 LAB
ALBUMIN SERPL-MCNC: 4.5 G/DL (ref 3.5–5.2)
ALBUMIN/GLOB SERPL: 1.8 (ref 1.1–2.2)
ALP SERPL-CCNC: 111 U/L (ref 40–129)
ALT SERPL-CCNC: 40 U/L (ref 10–50)
ANION GAP SERPL CALC-SCNC: 11 MMOL/L (ref 5–15)
APPEARANCE UR: ABNORMAL
AST SERPL-CCNC: 20 U/L (ref 10–50)
BACTERIA URNS QL MICRO: NEGATIVE /HPF
BASOPHILS # BLD: 0.1 K/UL (ref 0–1)
BASOPHILS NFR BLD: 1 % (ref 0–1)
BILIRUB SERPL-MCNC: 0.6 MG/DL (ref 0.2–1)
BILIRUB UR QL: NEGATIVE
BUN SERPL-MCNC: 20 MG/DL (ref 6–20)
BUN/CREAT SERPL: 25 (ref 12–20)
CALCIUM SERPL-MCNC: 9.6 MG/DL (ref 8.6–10)
CHLORIDE SERPL-SCNC: 100 MMOL/L (ref 98–107)
CO2 SERPL-SCNC: 28 MMOL/L (ref 22–29)
COLOR UR: ABNORMAL
CREAT SERPL-MCNC: 0.79 MG/DL (ref 0.7–1.2)
DIFFERENTIAL METHOD BLD: NORMAL
EOSINOPHIL # BLD: 0.1 K/UL (ref 0–0.4)
EOSINOPHIL NFR BLD: 3 % (ref 0–7)
EPITH CASTS URNS QL MICRO: ABNORMAL /LPF
ERYTHROCYTE [DISTWIDTH] IN BLOOD BY AUTOMATED COUNT: 13.1 % (ref 11.5–14.5)
FLUAV RNA SPEC QL NAA+PROBE: NOT DETECTED
FLUBV RNA SPEC QL NAA+PROBE: NOT DETECTED
GLOBULIN SER CALC-MCNC: 2.5 G/DL (ref 2–4)
GLUCOSE SERPL-MCNC: 92 MG/DL (ref 65–100)
GLUCOSE UR STRIP.AUTO-MCNC: NEGATIVE MG/DL
HCT VFR BLD AUTO: 46.1 % (ref 36.6–50.3)
HGB BLD-MCNC: 15.8 G/DL (ref 12.1–17)
HGB UR QL STRIP: ABNORMAL
HYALINE CASTS URNS QL MICRO: ABNORMAL /LPF
IMM GRANULOCYTES # BLD AUTO: 0 K/UL (ref 0–0.04)
IMM GRANULOCYTES NFR BLD AUTO: 0 % (ref 0–0.5)
KETONES UR QL STRIP.AUTO: 15 MG/DL
LEUKOCYTE ESTERASE UR QL STRIP.AUTO: NEGATIVE
LIPASE SERPL-CCNC: 46 U/L (ref 13–60)
LYMPHOCYTES # BLD: 1 K/UL (ref 0.8–3.5)
LYMPHOCYTES NFR BLD: 23 % (ref 12–49)
MCH RBC QN AUTO: 32.6 PG (ref 26–34)
MCHC RBC AUTO-ENTMCNC: 34.3 G/DL (ref 30–36.5)
MCV RBC AUTO: 95.2 FL (ref 80–99)
MONOCYTES # BLD: 0.4 K/UL (ref 0–1)
MONOCYTES NFR BLD: 10 % (ref 5–13)
MUCOUS THREADS URNS QL MICRO: ABNORMAL /LPF
NEUTS SEG # BLD: 2.6 K/UL (ref 1.8–8)
NEUTS SEG NFR BLD: 63 % (ref 32–75)
NITRITE UR QL STRIP.AUTO: NEGATIVE
NRBC # BLD: 0 K/UL (ref 0–0.01)
NRBC BLD-RTO: 0 PER 100 WBC
PH UR STRIP: 6 (ref 5–8)
PLATELET # BLD AUTO: 197 K/UL (ref 150–400)
PMV BLD AUTO: 10.1 FL (ref 8.9–12.9)
POTASSIUM SERPL-SCNC: 4.2 MMOL/L (ref 3.5–5.1)
PROT SERPL-MCNC: 7 G/DL (ref 6.4–8.3)
PROT UR STRIP-MCNC: ABNORMAL MG/DL
RBC # BLD AUTO: 4.84 M/UL (ref 4.1–5.7)
RBC #/AREA URNS HPF: ABNORMAL /HPF (ref 0–5)
SARS-COV-2 RNA RESP QL NAA+PROBE: NOT DETECTED
SODIUM SERPL-SCNC: 139 MMOL/L (ref 136–145)
SP GR UR REFRACTOMETRY: 1.02 (ref 1–1.03)
UROBILINOGEN UR QL STRIP.AUTO: 1 EU/DL (ref 0.2–1)
WBC # BLD AUTO: 4.1 K/UL (ref 4.1–11.1)
WBC URNS QL MICRO: ABNORMAL /HPF (ref 0–4)

## 2024-07-02 PROCEDURE — 81001 URINALYSIS AUTO W/SCOPE: CPT

## 2024-07-02 NOTE — HOME HEALTH
Reason for referral, [facility] SUMMARY of clinical condition:Patient is a 59 y/o male with Cerebral Palsy who was admitted for aspiration PNA and sepsis.Patient has Tf and was changed to a G-J tube to see if feeding via J tube less chance aspiration. Patient lives in a group home and the owner is a nurse and his POA and isknowledgeable and active in all apects of care.    Clinical Assessment/Skilled reason for admission to home health Patient is bedbound and complete care. Mitzi - caregiver states he only had 1/2 day at 50ml of pump and in home began to drool and increase need for suctioning with secretions which then drops saturations. Caregiver able to manage suctioning and adjust feeding back to 20ml and less drooling    Functional Mobility:  Bed Mobility (rolling/scooting): Dependent (requires assistance for all effort OR requires assist of 2 or more people)  Transfers (supine to chair and return to supine): Dependent (requires assistance for all effort OR requires assist of 2 or more people).  Patient uses device: yes  Gait:  Aida left to chair  DME:   Diagnosis: PNA    Subjective non verbal  CaregiverSylvia Felicity- caregiver is a nurse and is knowlegable on all care needsome this visit. The following education was provided regarding medications: medication interactions and look alike medications:  Patient/CG able to demonstrate knowledge through teach back with 100 percent accuracy.      notified of any discrepancies/medication interactions n/a.       A list of reconciled medications has been uploaded to media.    High risk med teaching was performed on   Patient at risk for falls No:   Recommended requesting PT/OT orders due to fall risk PT ordered  Patient response to recommended requesting of PT/OT ordered    Interdisciplinary communication with:none  Written Teaching Material Utilized:  Handbook, DIscharge instructions    Clinician reviewed orientation to home health booklet with patient/caregiver including

## 2024-07-02 NOTE — ED NOTES
Discharge instructions reviewed, discharge papers given and pt teaching completed to pt's caregiver. No prescription(s) discussed. Caregiver instructed to follow up with PCP regarding today's visit. Caregiver also instructed to report to ED if symptoms return, worsen, don't subside, and/or with onset of new symptoms.

## 2024-07-02 NOTE — ED TRIAGE NOTES
Pt to ED w/ caregiver who reports pt has been lethargic x3 days and is more lethargic today. Pt was a little difficult to arouse. Was recently admitted for aspiration pneumonia. Has J-tube. Received midodrine and fluids around 2025 for hypotension. Concerned for dehydration because pt hasn't met his feeding goals d/t increased secretions. Pt has been intermittently suctioned to reduce secretions.

## 2024-07-02 NOTE — ED PROVIDER NOTES
tablet 0.5 mg by Per G Tube route nightly.Historical Med      ipratropium 0.5 mg-albuterol 2.5 mg (DUONEB) 0.5-2.5 (3) MG/3ML SOLN nebulizer solution Inhale 3 mLs into the lungs every 4 hours as needed for Shortness of Breath or Wheezing, Disp-360 mL, R-1Normal      midodrine (PROAMATINE) 5 MG tablet 1 tablet by PEG Tube route 3 times daily (with meals), Disp-90 tablet, R-1Normal      LORazepam (ATIVAN) 2 MG/ML concentrated solution 1 mL by Per G Tube route 2 times daily. Max Daily Amount: 4 mgHistorical Med      glycopyrrolate (CUVPOSA) 1 MG/5ML SOLN solution GIVE 5ML (1MG) VIA PEG TUBE EVERY DAY FOR EXCESSIVE SALIVA ** REORDER WHEN LOW **, Disp-150 mL, R-5Normal      vitamin D (CHOLECALCIFEROL) 50 MCG (2000 UT) TABS tablet TAKE ONE TABLET CRUSHED BY PEG TUBE DAILY FOR SUPPLEMENT, Disp-29 tablet, R-5Normal      risperiDONE (RISPERDAL) 1 MG/ML oral solution GIVE 1ML BY G-TUBE 3 TIMES A DAY FOR ANXIETY, LASHING OUT, KICKING, THROWING SELF OUT OF WHEELCHAIR AGGRESSIVELY, OR YELLING., Disp-90 mL, R-0Normal      baclofen (LIORESAL) 10 MG tablet TAKE ONE TABLET 3 TIMES A DAY CRUSHED PER PEG TUBE FOR MUSCLE SPASMS, Disp-93 tablet, R-0Normal      sucralfate (CARAFATE) 1 GM/10ML suspension TAKE 5 MLS PER PEG TUBE TWICE DAILY FOR GERD, Disp-1200 mL, R-5Normal      loratadine (CLARITIN) 10 MG tablet Take 1 tablet by mouth daily, Disp-30 tablet, R-5Normal      azelastine (ASTELIN) 0.1 % nasal spray 2 sprays by Nasal route 2 times daily Use in each nostril as directed, Disp-120 mL, R-1Normal      OMEPRAZOLE PO 40 mg by Enteral route dailyHistorical Med      ipratropium (ATROVENT) 0.03 % nasal spray 2 sprays by Nasal route 2 times dailyHistorical Med      risperiDONE (RISPERDAL) 1 MG tablet 1 tablet by Enteral route 3 times dailyHistorical Med             ALLERGIES     Patient has no known allergies.    FAMILY HISTORY     History reviewed. No pertinent family history.       SOCIAL HISTORY       Social History     Socioeconomic

## 2024-07-03 ENCOUNTER — CLINICAL DOCUMENTATION (OUTPATIENT)
Age: 59
End: 2024-07-03

## 2024-08-08 ENCOUNTER — APPOINTMENT (OUTPATIENT)
Facility: HOSPITAL | Age: 59
DRG: 720 | End: 2024-08-08
Payer: MEDICAID

## 2024-08-08 ENCOUNTER — HOSPITAL ENCOUNTER (INPATIENT)
Facility: HOSPITAL | Age: 59
LOS: 4 days | Discharge: OTHER INSTITUTION | DRG: 720 | End: 2024-08-12
Attending: STUDENT IN AN ORGANIZED HEALTH CARE EDUCATION/TRAINING PROGRAM | Admitting: STUDENT IN AN ORGANIZED HEALTH CARE EDUCATION/TRAINING PROGRAM
Payer: MEDICAID

## 2024-08-08 ENCOUNTER — APPOINTMENT (OUTPATIENT)
Facility: HOSPITAL | Age: 59
DRG: 720 | End: 2024-08-08
Attending: STUDENT IN AN ORGANIZED HEALTH CARE EDUCATION/TRAINING PROGRAM
Payer: MEDICAID

## 2024-08-08 DIAGNOSIS — A41.9 SEVERE SEPSIS (HCC): ICD-10-CM

## 2024-08-08 DIAGNOSIS — R65.20 SEVERE SEPSIS (HCC): ICD-10-CM

## 2024-08-08 DIAGNOSIS — J18.9 PNEUMONIA DUE TO INFECTIOUS ORGANISM, UNSPECIFIED LATERALITY, UNSPECIFIED PART OF LUNG: Primary | ICD-10-CM

## 2024-08-08 LAB
ALBUMIN SERPL-MCNC: 4.4 G/DL (ref 3.5–5.2)
ALBUMIN/GLOB SERPL: 1.4 (ref 1.1–2.2)
ALP SERPL-CCNC: 109 U/L (ref 40–129)
ALT SERPL-CCNC: 26 U/L (ref 10–50)
ANION GAP SERPL CALC-SCNC: 16 MMOL/L (ref 5–15)
APPEARANCE UR: CLEAR
AST SERPL-CCNC: 15 U/L (ref 10–50)
BACTERIA URNS QL MICRO: NEGATIVE /HPF
BASOPHILS # BLD: 0 K/UL (ref 0–0.1)
BASOPHILS NFR BLD: 0 % (ref 0–1)
BILIRUB SERPL-MCNC: 0.6 MG/DL (ref 0.2–1)
BILIRUB UR QL: NEGATIVE
BUN SERPL-MCNC: 20 MG/DL (ref 6–20)
BUN/CREAT SERPL: 27 (ref 12–20)
CALCIUM SERPL-MCNC: 9.7 MG/DL (ref 8.6–10)
CAOX CRY URNS QL MICRO: ABNORMAL
CHLORIDE SERPL-SCNC: 102 MMOL/L (ref 98–107)
CO2 SERPL-SCNC: 26 MMOL/L (ref 22–29)
COLOR UR: ABNORMAL
CREAT SERPL-MCNC: 0.73 MG/DL (ref 0.7–1.2)
D DIMER PPP FEU-MCNC: 1.25 UG/ML(FEU)
DIFFERENTIAL METHOD BLD: ABNORMAL
EKG ATRIAL RATE: 130 BPM
EKG DIAGNOSIS: NORMAL
EKG P AXIS: 59 DEGREES
EKG P-R INTERVAL: 120 MS
EKG Q-T INTERVAL: 292 MS
EKG QRS DURATION: 72 MS
EKG QTC CALCULATION (BAZETT): 429 MS
EKG R AXIS: 44 DEGREES
EKG T AXIS: 36 DEGREES
EKG VENTRICULAR RATE: 130 BPM
EOSINOPHIL # BLD: 0 K/UL (ref 0–0.4)
EOSINOPHIL NFR BLD: 0 % (ref 0–7)
EPITH CASTS URNS QL MICRO: ABNORMAL /LPF
ERYTHROCYTE [DISTWIDTH] IN BLOOD BY AUTOMATED COUNT: 14.1 % (ref 11.5–14.5)
FLUAV RNA SPEC QL NAA+PROBE: NOT DETECTED
FLUBV RNA SPEC QL NAA+PROBE: NOT DETECTED
GLOBULIN SER CALC-MCNC: 3.2 G/DL (ref 2–4)
GLUCOSE BLD STRIP.AUTO-MCNC: 117 MG/DL (ref 65–117)
GLUCOSE SERPL-MCNC: 119 MG/DL (ref 65–100)
GLUCOSE UR STRIP.AUTO-MCNC: NEGATIVE MG/DL
HCT VFR BLD AUTO: 50.6 % (ref 36.6–50.3)
HGB BLD-MCNC: 17.6 G/DL (ref 12.1–17)
HGB UR QL STRIP: ABNORMAL
IMM GRANULOCYTES # BLD AUTO: 0 K/UL
IMM GRANULOCYTES NFR BLD AUTO: 0 %
KETONES UR QL STRIP.AUTO: 15 MG/DL
LACTATE BLD-SCNC: 2.07 MMOL/L (ref 0.4–2)
LACTATE BLD-SCNC: 2.45 MMOL/L (ref 0.4–2)
LEUKOCYTE ESTERASE UR QL STRIP.AUTO: NEGATIVE
LIPASE SERPL-CCNC: 38 U/L (ref 13–60)
LYMPHOCYTES # BLD: 0.6 K/UL (ref 0.8–3.5)
LYMPHOCYTES NFR BLD: 4 % (ref 12–49)
MAGNESIUM SERPL-MCNC: 2 MG/DL (ref 1.6–2.6)
MCH RBC QN AUTO: 33.3 PG (ref 26–34)
MCHC RBC AUTO-ENTMCNC: 34.8 G/DL (ref 30–36.5)
MCV RBC AUTO: 95.7 FL (ref 80–99)
MONOCYTES # BLD: 0.4 K/UL (ref 0–1)
MONOCYTES NFR BLD: 3 % (ref 5–13)
NEUTS BAND NFR BLD MANUAL: 5 % (ref 0–6)
NEUTS SEG # BLD: 12.8 K/UL (ref 1.8–8)
NEUTS SEG NFR BLD: 88 % (ref 32–75)
NITRITE UR QL STRIP.AUTO: NEGATIVE
NRBC # BLD: 0 K/UL (ref 0–0.01)
NRBC BLD-RTO: 0 PER 100 WBC
PH UR STRIP: 6.5 (ref 5–8)
PLATELET # BLD AUTO: 237 K/UL (ref 150–400)
PMV BLD AUTO: 10 FL (ref 8.9–12.9)
POTASSIUM SERPL-SCNC: 4.2 MMOL/L (ref 3.5–5.1)
PROT SERPL-MCNC: 7.6 G/DL (ref 6.4–8.3)
PROT UR STRIP-MCNC: NEGATIVE MG/DL
RBC # BLD AUTO: 5.29 M/UL (ref 4.1–5.7)
RBC #/AREA URNS HPF: ABNORMAL /HPF (ref 0–5)
RBC MORPH BLD: ABNORMAL
SARS-COV-2 RNA RESP QL NAA+PROBE: NOT DETECTED
SERVICE CMNT-IMP: NORMAL
SODIUM SERPL-SCNC: 144 MMOL/L (ref 136–145)
SP GR UR REFRACTOMETRY: 1.01 (ref 1–1.03)
SPECIMEN HOLD: NORMAL
TROPONIN T SERPL HS-MCNC: 7.8 NG/L (ref 0–22)
UROBILINOGEN UR QL STRIP.AUTO: 1 EU/DL (ref 0.2–1)
WBC # BLD AUTO: 13.8 K/UL (ref 4.1–11.1)
WBC URNS QL MICRO: ABNORMAL /HPF (ref 0–4)

## 2024-08-08 PROCEDURE — 6360000002 HC RX W HCPCS: Performed by: STUDENT IN AN ORGANIZED HEALTH CARE EDUCATION/TRAINING PROGRAM

## 2024-08-08 PROCEDURE — 87636 SARSCOV2 & INF A&B AMP PRB: CPT

## 2024-08-08 PROCEDURE — 83605 ASSAY OF LACTIC ACID: CPT

## 2024-08-08 PROCEDURE — 6360000004 HC RX CONTRAST MEDICATION: Performed by: STUDENT IN AN ORGANIZED HEALTH CARE EDUCATION/TRAINING PROGRAM

## 2024-08-08 PROCEDURE — 6370000000 HC RX 637 (ALT 250 FOR IP): Performed by: STUDENT IN AN ORGANIZED HEALTH CARE EDUCATION/TRAINING PROGRAM

## 2024-08-08 PROCEDURE — 87040 BLOOD CULTURE FOR BACTERIA: CPT

## 2024-08-08 PROCEDURE — 85025 COMPLETE CBC W/AUTO DIFF WBC: CPT

## 2024-08-08 PROCEDURE — 36415 COLL VENOUS BLD VENIPUNCTURE: CPT

## 2024-08-08 PROCEDURE — 2580000003 HC RX 258: Performed by: STUDENT IN AN ORGANIZED HEALTH CARE EDUCATION/TRAINING PROGRAM

## 2024-08-08 PROCEDURE — 81001 URINALYSIS AUTO W/SCOPE: CPT

## 2024-08-08 PROCEDURE — 96360 HYDRATION IV INFUSION INIT: CPT

## 2024-08-08 PROCEDURE — 2580000003 HC RX 258: Performed by: INTERNAL MEDICINE

## 2024-08-08 PROCEDURE — 93005 ELECTROCARDIOGRAM TRACING: CPT | Performed by: STUDENT IN AN ORGANIZED HEALTH CARE EDUCATION/TRAINING PROGRAM

## 2024-08-08 PROCEDURE — 6360000002 HC RX W HCPCS: Performed by: INTERNAL MEDICINE

## 2024-08-08 PROCEDURE — 71275 CT ANGIOGRAPHY CHEST: CPT

## 2024-08-08 PROCEDURE — 82962 GLUCOSE BLOOD TEST: CPT

## 2024-08-08 PROCEDURE — 1100000000 HC RM PRIVATE

## 2024-08-08 PROCEDURE — 85379 FIBRIN DEGRADATION QUANT: CPT

## 2024-08-08 PROCEDURE — 93010 ELECTROCARDIOGRAM REPORT: CPT | Performed by: INTERNAL MEDICINE

## 2024-08-08 PROCEDURE — 2700000000 HC OXYGEN THERAPY PER DAY

## 2024-08-08 PROCEDURE — 6370000000 HC RX 637 (ALT 250 FOR IP): Performed by: INTERNAL MEDICINE

## 2024-08-08 PROCEDURE — 94761 N-INVAS EAR/PLS OXIMETRY MLT: CPT

## 2024-08-08 PROCEDURE — 71045 X-RAY EXAM CHEST 1 VIEW: CPT

## 2024-08-08 PROCEDURE — 96375 TX/PRO/DX INJ NEW DRUG ADDON: CPT

## 2024-08-08 PROCEDURE — 83735 ASSAY OF MAGNESIUM: CPT

## 2024-08-08 PROCEDURE — 96365 THER/PROPH/DIAG IV INF INIT: CPT

## 2024-08-08 PROCEDURE — 84484 ASSAY OF TROPONIN QUANT: CPT

## 2024-08-08 PROCEDURE — 99285 EMERGENCY DEPT VISIT HI MDM: CPT

## 2024-08-08 PROCEDURE — 96361 HYDRATE IV INFUSION ADD-ON: CPT

## 2024-08-08 PROCEDURE — 83690 ASSAY OF LIPASE: CPT

## 2024-08-08 PROCEDURE — 80053 COMPREHEN METABOLIC PANEL: CPT

## 2024-08-08 PROCEDURE — 74177 CT ABD & PELVIS W/CONTRAST: CPT

## 2024-08-08 RX ORDER — CETIRIZINE HYDROCHLORIDE 10 MG/1
5 TABLET ORAL DAILY
Status: DISCONTINUED | OUTPATIENT
Start: 2024-08-08 | End: 2024-08-12 | Stop reason: HOSPADM

## 2024-08-08 RX ORDER — FERROUS SULFATE 300 MG/5ML
308 LIQUID (ML) ORAL DAILY
Status: DISCONTINUED | OUTPATIENT
Start: 2024-08-08 | End: 2024-08-08

## 2024-08-08 RX ORDER — FERROUS SULFATE 300 MG/5ML
300 LIQUID (ML) ORAL DAILY
Status: DISCONTINUED | OUTPATIENT
Start: 2024-08-08 | End: 2024-08-12 | Stop reason: HOSPADM

## 2024-08-08 RX ORDER — RISPERIDONE 1 MG/ML
1 SOLUTION ORAL 3 TIMES DAILY PRN
Status: DISCONTINUED | OUTPATIENT
Start: 2024-08-08 | End: 2024-08-08

## 2024-08-08 RX ORDER — SODIUM CHLORIDE 9 MG/ML
INJECTION, SOLUTION INTRAVENOUS CONTINUOUS
Status: DISCONTINUED | OUTPATIENT
Start: 2024-08-08 | End: 2024-08-09

## 2024-08-08 RX ORDER — ONDANSETRON 2 MG/ML
4 INJECTION INTRAMUSCULAR; INTRAVENOUS ONCE
Status: DISCONTINUED | OUTPATIENT
Start: 2024-08-08 | End: 2024-08-08

## 2024-08-08 RX ORDER — BISACODYL 10 MG
10 SUPPOSITORY, RECTAL RECTAL DAILY PRN
Status: DISCONTINUED | OUTPATIENT
Start: 2024-08-08 | End: 2024-08-12 | Stop reason: HOSPADM

## 2024-08-08 RX ORDER — POLYETHYLENE GLYCOL 3350 17 G/17G
17 POWDER, FOR SOLUTION ORAL DAILY PRN
Status: DISCONTINUED | OUTPATIENT
Start: 2024-08-08 | End: 2024-08-12 | Stop reason: HOSPADM

## 2024-08-08 RX ORDER — ONDANSETRON 4 MG/1
4 TABLET, ORALLY DISINTEGRATING ORAL EVERY 8 HOURS PRN
Status: DISCONTINUED | OUTPATIENT
Start: 2024-08-08 | End: 2024-08-12 | Stop reason: HOSPADM

## 2024-08-08 RX ORDER — IPRATROPIUM BROMIDE AND ALBUTEROL SULFATE 2.5; .5 MG/3ML; MG/3ML
1 SOLUTION RESPIRATORY (INHALATION) EVERY 4 HOURS PRN
Status: DISCONTINUED | OUTPATIENT
Start: 2024-08-08 | End: 2024-08-08

## 2024-08-08 RX ORDER — GLYCOPYRROLATE 1 MG/1
1 TABLET ORAL DAILY PRN
Status: DISCONTINUED | OUTPATIENT
Start: 2024-08-08 | End: 2024-08-09

## 2024-08-08 RX ORDER — ENOXAPARIN SODIUM 100 MG/ML
30 INJECTION SUBCUTANEOUS DAILY
Status: DISCONTINUED | OUTPATIENT
Start: 2024-08-08 | End: 2024-08-12 | Stop reason: HOSPADM

## 2024-08-08 RX ORDER — GUAIFENESIN 200 MG/10ML
200 LIQUID ORAL 4 TIMES DAILY PRN
Status: DISCONTINUED | OUTPATIENT
Start: 2024-08-08 | End: 2024-08-12 | Stop reason: HOSPADM

## 2024-08-08 RX ORDER — SODIUM CHLORIDE 9 MG/ML
INJECTION, SOLUTION INTRAVENOUS PRN
Status: DISCONTINUED | OUTPATIENT
Start: 2024-08-08 | End: 2024-08-12 | Stop reason: HOSPADM

## 2024-08-08 RX ORDER — SODIUM CHLORIDE, SODIUM LACTATE, POTASSIUM CHLORIDE, AND CALCIUM CHLORIDE .6; .31; .03; .02 G/100ML; G/100ML; G/100ML; G/100ML
1000 INJECTION, SOLUTION INTRAVENOUS
Status: COMPLETED | OUTPATIENT
Start: 2024-08-08 | End: 2024-08-08

## 2024-08-08 RX ORDER — IPRATROPIUM BROMIDE AND ALBUTEROL SULFATE 2.5; .5 MG/3ML; MG/3ML
1 SOLUTION RESPIRATORY (INHALATION) EVERY 6 HOURS SCHEDULED
Status: DISCONTINUED | OUTPATIENT
Start: 2024-08-08 | End: 2024-08-09

## 2024-08-08 RX ORDER — SODIUM CHLORIDE 0.9 % (FLUSH) 0.9 %
5-40 SYRINGE (ML) INJECTION PRN
Status: DISCONTINUED | OUTPATIENT
Start: 2024-08-08 | End: 2024-08-12 | Stop reason: HOSPADM

## 2024-08-08 RX ORDER — MAGNESIUM SULFATE IN WATER 40 MG/ML
2000 INJECTION, SOLUTION INTRAVENOUS PRN
Status: DISCONTINUED | OUTPATIENT
Start: 2024-08-08 | End: 2024-08-08

## 2024-08-08 RX ORDER — SODIUM CHLORIDE 0.9 % (FLUSH) 0.9 %
5-40 SYRINGE (ML) INJECTION EVERY 12 HOURS SCHEDULED
Status: DISCONTINUED | OUTPATIENT
Start: 2024-08-08 | End: 2024-08-12 | Stop reason: HOSPADM

## 2024-08-08 RX ORDER — ACETAMINOPHEN 325 MG/1
650 TABLET ORAL EVERY 6 HOURS PRN
Status: DISCONTINUED | OUTPATIENT
Start: 2024-08-08 | End: 2024-08-12 | Stop reason: HOSPADM

## 2024-08-08 RX ORDER — SODIUM CHLORIDE, SODIUM LACTATE, POTASSIUM CHLORIDE, AND CALCIUM CHLORIDE .6; .31; .03; .02 G/100ML; G/100ML; G/100ML; G/100ML
500 INJECTION, SOLUTION INTRAVENOUS
Status: COMPLETED | OUTPATIENT
Start: 2024-08-08 | End: 2024-08-08

## 2024-08-08 RX ORDER — POTASSIUM CHLORIDE 7.45 MG/ML
10 INJECTION INTRAVENOUS PRN
Status: DISCONTINUED | OUTPATIENT
Start: 2024-08-08 | End: 2024-08-08

## 2024-08-08 RX ORDER — ACETAMINOPHEN 650 MG/1
650 SUPPOSITORY RECTAL EVERY 6 HOURS PRN
Status: DISCONTINUED | OUTPATIENT
Start: 2024-08-08 | End: 2024-08-12 | Stop reason: HOSPADM

## 2024-08-08 RX ORDER — POLYETHYLENE GLYCOL 3350 17 G/17G
8 POWDER, FOR SOLUTION ORAL DAILY
Status: DISCONTINUED | OUTPATIENT
Start: 2024-08-08 | End: 2024-08-12 | Stop reason: HOSPADM

## 2024-08-08 RX ORDER — ACETAMINOPHEN 160 MG/5ML
650 LIQUID ORAL 2 TIMES DAILY
Status: DISCONTINUED | OUTPATIENT
Start: 2024-08-08 | End: 2024-08-12 | Stop reason: HOSPADM

## 2024-08-08 RX ORDER — BACLOFEN 10 MG/1
10 TABLET ORAL 3 TIMES DAILY
Status: DISCONTINUED | OUTPATIENT
Start: 2024-08-08 | End: 2024-08-12 | Stop reason: HOSPADM

## 2024-08-08 RX ORDER — MIDODRINE HYDROCHLORIDE 5 MG/1
5 TABLET ORAL
Status: DISCONTINUED | OUTPATIENT
Start: 2024-08-08 | End: 2024-08-12 | Stop reason: HOSPADM

## 2024-08-08 RX ORDER — ONDANSETRON 2 MG/ML
4 INJECTION INTRAMUSCULAR; INTRAVENOUS EVERY 6 HOURS PRN
Status: DISCONTINUED | OUTPATIENT
Start: 2024-08-08 | End: 2024-08-12 | Stop reason: HOSPADM

## 2024-08-08 RX ORDER — POTASSIUM CHLORIDE 750 MG/1
40 TABLET, FILM COATED, EXTENDED RELEASE ORAL PRN
Status: DISCONTINUED | OUTPATIENT
Start: 2024-08-08 | End: 2024-08-08

## 2024-08-08 RX ORDER — ONDANSETRON HYDROCHLORIDE 4 MG/5ML
4 SOLUTION ORAL ONCE
Status: COMPLETED | OUTPATIENT
Start: 2024-08-08 | End: 2024-08-08

## 2024-08-08 RX ORDER — RISPERIDONE 1 MG/1
1 TABLET ORAL 3 TIMES DAILY
Status: DISCONTINUED | OUTPATIENT
Start: 2024-08-08 | End: 2024-08-08

## 2024-08-08 RX ORDER — ACETAMINOPHEN 160 MG/5ML
640.39 SUSPENSION ORAL 2 TIMES DAILY
Status: DISCONTINUED | OUTPATIENT
Start: 2024-08-08 | End: 2024-08-08

## 2024-08-08 RX ORDER — RISPERIDONE 1 MG/ML
2 SOLUTION ORAL 3 TIMES DAILY
Status: DISCONTINUED | OUTPATIENT
Start: 2024-08-08 | End: 2024-08-12 | Stop reason: HOSPADM

## 2024-08-08 RX ORDER — SODIUM CHLORIDE, SODIUM LACTATE, POTASSIUM CHLORIDE, CALCIUM CHLORIDE 600; 310; 30; 20 MG/100ML; MG/100ML; MG/100ML; MG/100ML
INJECTION, SOLUTION INTRAVENOUS CONTINUOUS
Status: CANCELLED | OUTPATIENT
Start: 2024-08-08

## 2024-08-08 RX ORDER — LORAZEPAM 2 MG/ML
2 CONCENTRATE ORAL EVERY 6 HOURS PRN
Status: DISCONTINUED | OUTPATIENT
Start: 2024-08-08 | End: 2024-08-12 | Stop reason: HOSPADM

## 2024-08-08 RX ORDER — MIRTAZAPINE 15 MG/1
7.5 TABLET, FILM COATED ORAL NIGHTLY
Status: DISCONTINUED | OUTPATIENT
Start: 2024-08-08 | End: 2024-08-12 | Stop reason: HOSPADM

## 2024-08-08 RX ADMIN — CEFTRIAXONE 1000 MG: 1 INJECTION, POWDER, FOR SOLUTION INTRAMUSCULAR; INTRAVENOUS at 10:06

## 2024-08-08 RX ADMIN — AZITHROMYCIN DIHYDRATE 500 MG: 500 INJECTION, POWDER, LYOPHILIZED, FOR SOLUTION INTRAVENOUS at 10:49

## 2024-08-08 RX ADMIN — ACETAMINOPHEN 650 MG: 160 SOLUTION ORAL at 20:05

## 2024-08-08 RX ADMIN — ACETAMINOPHEN 650 MG: 160 SOLUTION ORAL at 12:24

## 2024-08-08 RX ADMIN — Medication 2 MG: at 20:05

## 2024-08-08 RX ADMIN — SODIUM CHLORIDE, POTASSIUM CHLORIDE, SODIUM LACTATE AND CALCIUM CHLORIDE 500 ML: 600; 310; 30; 20 INJECTION, SOLUTION INTRAVENOUS at 11:19

## 2024-08-08 RX ADMIN — Medication 2 MG: at 15:29

## 2024-08-08 RX ADMIN — Medication 300 MG: at 15:29

## 2024-08-08 RX ADMIN — SODIUM CHLORIDE, POTASSIUM CHLORIDE, SODIUM LACTATE AND CALCIUM CHLORIDE 1000 ML: 600; 310; 30; 20 INJECTION, SOLUTION INTRAVENOUS at 08:25

## 2024-08-08 RX ADMIN — ONDANSETRON 4 MG: 4 SOLUTION ORAL at 09:11

## 2024-08-08 RX ADMIN — IPRATROPIUM BROMIDE AND ALBUTEROL SULFATE 1 DOSE: .5; 3 SOLUTION RESPIRATORY (INHALATION) at 20:31

## 2024-08-08 RX ADMIN — PIPERACILLIN AND TAZOBACTAM 4500 MG: 4; .5 INJECTION, POWDER, LYOPHILIZED, FOR SOLUTION INTRAVENOUS at 12:23

## 2024-08-08 RX ADMIN — PIPERACILLIN AND TAZOBACTAM 3375 MG: 3; .375 INJECTION, POWDER, LYOPHILIZED, FOR SOLUTION INTRAVENOUS at 17:19

## 2024-08-08 RX ADMIN — MIDODRINE HYDROCHLORIDE 5 MG: 5 TABLET ORAL at 15:28

## 2024-08-08 RX ADMIN — MIRTAZAPINE 7.5 MG: 15 TABLET, FILM COATED ORAL at 20:04

## 2024-08-08 RX ADMIN — BACLOFEN 10 MG: 10 TABLET ORAL at 20:04

## 2024-08-08 RX ADMIN — SODIUM CHLORIDE: 9 INJECTION, SOLUTION INTRAVENOUS at 15:02

## 2024-08-08 RX ADMIN — PANTOPRAZOLE SODIUM 40 MG: 40 INJECTION, POWDER, FOR SOLUTION INTRAVENOUS at 15:05

## 2024-08-08 RX ADMIN — BACLOFEN 10 MG: 10 TABLET ORAL at 15:06

## 2024-08-08 RX ADMIN — IOPAMIDOL 100 ML: 755 INJECTION, SOLUTION INTRAVENOUS at 10:39

## 2024-08-08 RX ADMIN — SODIUM CHLORIDE, PRESERVATIVE FREE 10 ML: 5 INJECTION INTRAVENOUS at 20:14

## 2024-08-08 ASSESSMENT — PAIN SCALES - WONG BAKER
WONGBAKER_NUMERICALRESPONSE: NO HURT
WONGBAKER_NUMERICALRESPONSE: NO HURT

## 2024-08-08 ASSESSMENT — PAIN - FUNCTIONAL ASSESSMENT: PAIN_FUNCTIONAL_ASSESSMENT: ADULT NONVERBAL PAIN SCALE (NPVS)

## 2024-08-08 NOTE — ED TRIAGE NOTES
Pt arrives via EMS on a stretcher. Pt is alert and has his arms pulled up to his chest and they appeared to be contracted.  EMS reports pt came from a group home and they were called because pt had nausea and projectile vomiting and that he had syncopal episode he respond to a sternal rub from staff.  Mica Reagan LPN who is his nurse from the group home reports pt has a hx of Cerebral Palsy, Anemia, seizures and that he is non verbal  During her rounds she noticed is 02 sat's had dropped to 89 and she started a breathing treatment and gave him 10 ml of guaifenesin and patient then projectile vomited and they moved him to a wheelchair for safety and that is when he passed out.

## 2024-08-08 NOTE — H&P
RITO VALENZUELA [facility]  [address] [place] [address] [phone]        Hospitalist Admission History and Physical      NAME:  Ayden Simpson   :   1965   MRN:  [id number]     PCP:  Alisson Mandel, APRN - CNP     Date/Time of service:  2024  2:59 PM        Subjective:     CHIEF COMPLAINT: aspiration      HISTORY OF PRESENT ILLNESS:     The patient is a 59 yo hx of cerebral palsy, presented w/ sepsis, resp failure, aspiration pneumonia.  The patient cannot give a history.  He lives in a group home.  His caretaker stated that he developed sudden, severe vomiting this AM.  Afterward, the patient developed dyspnea, hypoxia, and fevers.  In the ED, WBC was 13.8, lactate 2.5.  Chest/abd CT showed bilateral lower lobes pneumonia.  The patient was last admitted in  with aspiration pneumonia     No Known Allergies    Prior to Admission medications    Medication Sig Start Date End Date Taking? Authorizing Provider   ferrous sulfate 220 (44 Fe) MG/5ML solution 7 mLs by Per G Tube route daily 7ml= 300mg 24   Automatic Reconciliation, Ar   loratadine (CLARITIN) 5 MG/5ML solution 10 mLs by Per G Tube route daily    ProviderTerry MD   pantoprazole (PROTONIX) 40 MG tablet 40 mg daily. G tube  Patient not taking: Reported on 2024    ProviderTerry MD   polyethylene glycol (GLYCOLAX) 17 GM/SCOOP powder 8 g by Per G Tube route daily 24   Automatic Reconciliation, Ar   acetaminophen (TYLENOL) 160 MG/5ML suspension 20 mLs by Per G Tube route in the morning and 20 mLs in the evening.    ProviderTerry MD   bisacodyl (DULCOLAX) 10 MG suppository Place 1 suppository rectally daily as needed for Constipation    Terry Lam MD   guaiFENesin (ROBITUSSIN) 100 MG/5ML liquid 10 mLs by Per G Tube route 4 times daily as needed for Congestion    Terry Lam MD   mirtazapine (REMERON SOL-TAB) 15 MG disintegrating tablet 0.5 mg by Per G Tube route

## 2024-08-08 NOTE — CARE COORDINATION
Care Management Initial Assessment       RUR: 21% High  Readmission? No  1st IM letter given? NA  1st  letter given: NA    Patient presented to the ED today with aspiration. He has history of cerbral palsy, contracted joint, non verbal, GERD, hip dysplasia, mass of bladder, muscle spasm (upper and lower), and microcephaly. Mitzi requesting pulm consult. Patient has tube feeds with [facility].     CM met with patient's caregivers Mitzi and Elizabeth. Patient is from [facility]. He is total care. [facility]        08/08/24 0330   Service Assessment   Patient Orientation Unable to Assess   History Provided By Other (see comment)  (Caregiver Mitzi Blevins, [phone])   Primary Caregiver Other (Comment)  ([facility])   Accompanied By/Relationship Caregivers Mitzi and Elizabeth   Support Systems Other (Comment)  (Group Home staff)   PCP Verified by CM Yes  (NP Alisson Mandel)   Last Visit to PCP Within last 3 months   Prior Functional Level Assistance with the following:;Bathing;Dressing;Toileting;Feeding;Mobility   Current Functional Level Independent in ADLs/IADLs;Assistance with the following:;Bathing;Dressing;Toileting;Feeding   Can patient return to prior living arrangement Yes   Financial Resources Medicaid   Community Resources None   Social/Functional History   Lives With Home care staff   Type of Home   (Group Home)   Home Equipment Hospital bed   Receives Help From Other (comment)  ([facility] staff)   ADL Assistance Needs assistance   Homemaking Assistance Needs assistance   Ambulation Assistance Non-ambulatory   Transfer Assistance Needs assistance   Active  No   Discharge Planning   Type of Residence Group Home   Current Services Prior To Admission Other (Comment)  ([facility])   Potential Assistance Needed   ([facility])   DME Ordered? No   Potential Assistance Purchasing Medications No   Type of Home Care Services None

## 2024-08-08 NOTE — ED NOTES
This RN gave a verbal report on this patient to Efrain Hong Paramedic in the SBAR format including Med/lab Review.    All questions answered.     Pt left with Zoysn running and on a cardiac monitoring and 2l via NC.

## 2024-08-08 NOTE — ED PROVIDER NOTES
[facility] EMERGENCY DEPT  EMERGENCY DEPARTMENT ENCOUNTER      Pt Name: Ayden Simpson  MRN: [id number]  Birthdate 1965  Date of evaluation: 8/8/2024  Provider: Gemini Mari DO    CHIEF COMPLAINT       Chief Complaint   Patient presents with    Loss of Consciousness       PMH   Past Medical History:   Diagnosis Date    Anxiety     Cerebral palsy (HCC)     Contracted, joint, multiple sites     upper extremities due to cerebral palsy    Developmental non-verbal disorder     Encephalopathy chronic     GERD (gastroesophageal reflux disease)     Hip dysplasia     Ill-defined condition     cerebral palsy    Intellectual disability     Mass of bladder     Microcephaly (HCC)     Muscle spasm     upper and lower extremities         MDM:   Vitals:    Vitals:    08/08/24 0958   BP: 117/89   Pulse: (!) 127   Resp: 25   Temp:    SpO2: 92%           This is a 58 y.o. male with pmhx nonverbal, G-tube dependent, CP, aspiration pneumonia in the past, dysphagia who presents today for cc of syncope.  Patient unable to give history secondary to nonverbal status.  Home nurses at bedside states that earlier today at around 3 AM he had an episode of emesis, nonbloody nonbilious.  And then briefly lost consciousness but responded immediately to a sternal rub.  No tonic-clonic motions, no loss of bowel or bladder.  The nurse noted that the patient's oxygen saturation dropped down to around 89%, she gave him a breathing treatment he had another episode of vomiting and they brought him in for evaluation.  Last time something similar like this happened he had aspiration pneumonia and he was admitted to the hospital for 2 weeks.  He is otherwise acting at his mentation baseline.  No one else is sick in the group home.    On arrival VS include tachycardia, borderline O2 sat at 90% on room air.  Otherwise stable.   Physical Exam  General: Alert, no acute distress  HEENT: Atraumatic.  EOMI, dry oral mucosa, no conjunctival injection  Neck:

## 2024-08-09 LAB
ANION GAP SERPL CALC-SCNC: 5 MMOL/L (ref 5–15)
BASOPHILS # BLD: 0.1 K/UL (ref 0–0.1)
BASOPHILS NFR BLD: 0 % (ref 0–1)
BUN SERPL-MCNC: 12 MG/DL (ref 6–20)
BUN/CREAT SERPL: 19 (ref 12–20)
CALCIUM SERPL-MCNC: 8.3 MG/DL (ref 8.5–10.1)
CHLORIDE SERPL-SCNC: 112 MMOL/L (ref 97–108)
CO2 SERPL-SCNC: 27 MMOL/L (ref 21–32)
CREAT SERPL-MCNC: 0.64 MG/DL (ref 0.7–1.3)
CRP SERPL-MCNC: 15.1 MG/DL (ref 0–0.3)
DIFFERENTIAL METHOD BLD: ABNORMAL
EOSINOPHIL # BLD: 0.2 K/UL (ref 0–0.4)
EOSINOPHIL NFR BLD: 1 % (ref 0–7)
ERYTHROCYTE [DISTWIDTH] IN BLOOD BY AUTOMATED COUNT: 14.6 % (ref 11.5–14.5)
GLUCOSE SERPL-MCNC: 101 MG/DL (ref 65–100)
HCT VFR BLD AUTO: 38.1 % (ref 36.6–50.3)
HGB BLD-MCNC: 13 G/DL (ref 12.1–17)
IMM GRANULOCYTES # BLD AUTO: 0.1 K/UL (ref 0–0.04)
IMM GRANULOCYTES NFR BLD AUTO: 1 % (ref 0–0.5)
LACTATE SERPL-SCNC: 0.8 MMOL/L (ref 0.4–2)
LYMPHOCYTES # BLD: 0.9 K/UL (ref 0.8–3.5)
LYMPHOCYTES NFR BLD: 6 % (ref 12–49)
MAGNESIUM SERPL-MCNC: 1.9 MG/DL (ref 1.6–2.4)
MCH RBC QN AUTO: 32.9 PG (ref 26–34)
MCHC RBC AUTO-ENTMCNC: 34.1 G/DL (ref 30–36.5)
MCV RBC AUTO: 96.5 FL (ref 80–99)
MONOCYTES # BLD: 0.6 K/UL (ref 0–1)
MONOCYTES NFR BLD: 4 % (ref 5–13)
NEUTS SEG # BLD: 12.8 K/UL (ref 1.8–8)
NEUTS SEG NFR BLD: 88 % (ref 32–75)
NRBC # BLD: 0 K/UL (ref 0–0.01)
NRBC BLD-RTO: 0 PER 100 WBC
PHOSPHATE SERPL-MCNC: 3.2 MG/DL (ref 2.6–4.7)
PLATELET # BLD AUTO: 165 K/UL (ref 150–400)
PMV BLD AUTO: 9.9 FL (ref 8.9–12.9)
POTASSIUM SERPL-SCNC: 3.5 MMOL/L (ref 3.5–5.1)
PROCALCITONIN SERPL-MCNC: 6.35 NG/ML
RBC # BLD AUTO: 3.95 M/UL (ref 4.1–5.7)
SODIUM SERPL-SCNC: 144 MMOL/L (ref 136–145)
WBC # BLD AUTO: 14.5 K/UL (ref 4.1–11.1)

## 2024-08-09 PROCEDURE — 84145 PROCALCITONIN (PCT): CPT

## 2024-08-09 PROCEDURE — 83605 ASSAY OF LACTIC ACID: CPT

## 2024-08-09 PROCEDURE — 6370000000 HC RX 637 (ALT 250 FOR IP): Performed by: STUDENT IN AN ORGANIZED HEALTH CARE EDUCATION/TRAINING PROGRAM

## 2024-08-09 PROCEDURE — 86140 C-REACTIVE PROTEIN: CPT

## 2024-08-09 PROCEDURE — 83735 ASSAY OF MAGNESIUM: CPT

## 2024-08-09 PROCEDURE — 2700000000 HC OXYGEN THERAPY PER DAY

## 2024-08-09 PROCEDURE — 1100000000 HC RM PRIVATE

## 2024-08-09 PROCEDURE — 6370000000 HC RX 637 (ALT 250 FOR IP)

## 2024-08-09 PROCEDURE — 6370000000 HC RX 637 (ALT 250 FOR IP): Performed by: INTERNAL MEDICINE

## 2024-08-09 PROCEDURE — 36415 COLL VENOUS BLD VENIPUNCTURE: CPT

## 2024-08-09 PROCEDURE — 80048 BASIC METABOLIC PNL TOTAL CA: CPT

## 2024-08-09 PROCEDURE — 6360000002 HC RX W HCPCS: Performed by: INTERNAL MEDICINE

## 2024-08-09 PROCEDURE — 84100 ASSAY OF PHOSPHORUS: CPT

## 2024-08-09 PROCEDURE — 6360000002 HC RX W HCPCS: Performed by: STUDENT IN AN ORGANIZED HEALTH CARE EDUCATION/TRAINING PROGRAM

## 2024-08-09 PROCEDURE — 2580000003 HC RX 258: Performed by: INTERNAL MEDICINE

## 2024-08-09 PROCEDURE — 85025 COMPLETE CBC W/AUTO DIFF WBC: CPT

## 2024-08-09 PROCEDURE — 94640 AIRWAY INHALATION TREATMENT: CPT

## 2024-08-09 PROCEDURE — 2580000003 HC RX 258: Performed by: STUDENT IN AN ORGANIZED HEALTH CARE EDUCATION/TRAINING PROGRAM

## 2024-08-09 PROCEDURE — 94761 N-INVAS EAR/PLS OXIMETRY MLT: CPT

## 2024-08-09 RX ORDER — SODIUM CHLORIDE, SODIUM LACTATE, POTASSIUM CHLORIDE, CALCIUM CHLORIDE 600; 310; 30; 20 MG/100ML; MG/100ML; MG/100ML; MG/100ML
INJECTION, SOLUTION INTRAVENOUS CONTINUOUS
Status: DISCONTINUED | OUTPATIENT
Start: 2024-08-09 | End: 2024-08-09

## 2024-08-09 RX ORDER — BUMETANIDE 0.25 MG/ML
0.5 INJECTION INTRAMUSCULAR; INTRAVENOUS ONCE
Status: COMPLETED | OUTPATIENT
Start: 2024-08-09 | End: 2024-08-09

## 2024-08-09 RX ORDER — OXYCODONE HYDROCHLORIDE 5 MG/1
2.5 TABLET ORAL EVERY 6 HOURS PRN
Status: DISCONTINUED | OUTPATIENT
Start: 2024-08-09 | End: 2024-08-12 | Stop reason: HOSPADM

## 2024-08-09 RX ORDER — IPRATROPIUM BROMIDE AND ALBUTEROL SULFATE 2.5; .5 MG/3ML; MG/3ML
1 SOLUTION RESPIRATORY (INHALATION)
Status: DISCONTINUED | OUTPATIENT
Start: 2024-08-09 | End: 2024-08-11

## 2024-08-09 RX ORDER — GLYCOPYRROLATE 0.2 MG/ML
0.1 INJECTION INTRAMUSCULAR; INTRAVENOUS DAILY PRN
Status: DISCONTINUED | OUTPATIENT
Start: 2024-08-09 | End: 2024-08-12 | Stop reason: HOSPADM

## 2024-08-09 RX ORDER — ACETAMINOPHEN 160 MG/5ML
160 LIQUID ORAL EVERY 6 HOURS PRN
Status: DISCONTINUED | OUTPATIENT
Start: 2024-08-09 | End: 2024-08-12 | Stop reason: HOSPADM

## 2024-08-09 RX ORDER — OXYCODONE HYDROCHLORIDE AND ACETAMINOPHEN 5; 325 MG/1; MG/1
0.5 TABLET ORAL EVERY 6 HOURS PRN
Status: DISCONTINUED | OUTPATIENT
Start: 2024-08-09 | End: 2024-08-09 | Stop reason: CLARIF

## 2024-08-09 RX ADMIN — MIDODRINE HYDROCHLORIDE 5 MG: 5 TABLET ORAL at 12:07

## 2024-08-09 RX ADMIN — Medication 300 MG: at 12:10

## 2024-08-09 RX ADMIN — OXYCODONE HYDROCHLORIDE 2.5 MG: 5 TABLET ORAL at 15:02

## 2024-08-09 RX ADMIN — IPRATROPIUM BROMIDE AND ALBUTEROL SULFATE 1 DOSE: 2.5; .5 SOLUTION RESPIRATORY (INHALATION) at 19:56

## 2024-08-09 RX ADMIN — PANTOPRAZOLE SODIUM 40 MG: 40 INJECTION, POWDER, FOR SOLUTION INTRAVENOUS at 12:09

## 2024-08-09 RX ADMIN — ACETAMINOPHEN 650 MG: 325 TABLET ORAL at 02:09

## 2024-08-09 RX ADMIN — WATER 40 MG: 1 INJECTION INTRAMUSCULAR; INTRAVENOUS; SUBCUTANEOUS at 18:29

## 2024-08-09 RX ADMIN — BACLOFEN 10 MG: 10 TABLET ORAL at 12:07

## 2024-08-09 RX ADMIN — SODIUM CHLORIDE 5 ML/HR: 9 INJECTION, SOLUTION INTRAVENOUS at 12:02

## 2024-08-09 RX ADMIN — BACLOFEN 10 MG: 10 TABLET ORAL at 21:24

## 2024-08-09 RX ADMIN — Medication 2 MG: at 12:09

## 2024-08-09 RX ADMIN — ACETAMINOPHEN 650 MG: 160 SOLUTION ORAL at 21:23

## 2024-08-09 RX ADMIN — POLYETHYLENE GLYCOL 3350 8.5 G: 17 POWDER, FOR SOLUTION ORAL at 12:10

## 2024-08-09 RX ADMIN — BUMETANIDE 0.5 MG: 0.25 INJECTION INTRAMUSCULAR; INTRAVENOUS at 18:28

## 2024-08-09 RX ADMIN — PIPERACILLIN AND TAZOBACTAM 3375 MG: 3; .375 INJECTION, POWDER, LYOPHILIZED, FOR SOLUTION INTRAVENOUS at 02:05

## 2024-08-09 RX ADMIN — MIDODRINE HYDROCHLORIDE 5 MG: 5 TABLET ORAL at 18:29

## 2024-08-09 RX ADMIN — PIPERACILLIN AND TAZOBACTAM 3375 MG: 3; .375 INJECTION, POWDER, LYOPHILIZED, FOR SOLUTION INTRAVENOUS at 23:24

## 2024-08-09 RX ADMIN — IPRATROPIUM BROMIDE AND ALBUTEROL SULFATE 1 DOSE: 2.5; .5 SOLUTION RESPIRATORY (INHALATION) at 07:14

## 2024-08-09 RX ADMIN — IPRATROPIUM BROMIDE AND ALBUTEROL SULFATE 1 DOSE: 2.5; .5 SOLUTION RESPIRATORY (INHALATION) at 14:44

## 2024-08-09 RX ADMIN — GLYCOPYRROLATE 1 MG: 1 TABLET ORAL at 15:02

## 2024-08-09 RX ADMIN — Medication 2 MG: at 21:25

## 2024-08-09 RX ADMIN — ENOXAPARIN SODIUM 30 MG: 100 INJECTION SUBCUTANEOUS at 12:35

## 2024-08-09 RX ADMIN — ACETAMINOPHEN 650 MG: 160 SOLUTION ORAL at 12:08

## 2024-08-09 RX ADMIN — PIPERACILLIN AND TAZOBACTAM 3375 MG: 3; .375 INJECTION, POWDER, LYOPHILIZED, FOR SOLUTION INTRAVENOUS at 15:02

## 2024-08-09 RX ADMIN — AZITHROMYCIN MONOHYDRATE 500 MG: 500 INJECTION, POWDER, LYOPHILIZED, FOR SOLUTION INTRAVENOUS at 12:05

## 2024-08-09 RX ADMIN — SODIUM CHLORIDE, PRESERVATIVE FREE 10 ML: 5 INJECTION INTRAVENOUS at 12:09

## 2024-08-09 RX ADMIN — MIRTAZAPINE 7.5 MG: 15 TABLET, FILM COATED ORAL at 21:24

## 2024-08-09 RX ADMIN — SODIUM CHLORIDE, PRESERVATIVE FREE 20 ML: 5 INJECTION INTRAVENOUS at 21:35

## 2024-08-09 RX ADMIN — CETIRIZINE HYDROCHLORIDE 5 MG: 10 TABLET, FILM COATED ORAL at 12:08

## 2024-08-09 ASSESSMENT — PAIN SCALES - WONG BAKER: WONGBAKER_NUMERICALRESPONSE: NO HURT

## 2024-08-09 NOTE — CONSULTS
[facility]     Name: Ayden Simpson MRN: [id number]   : 1965 Hospital: [facility]   Date: 2024        Impression Plan   Acute respiratory failure  Hypoxia  Aspiration pneumonitis/PNA  Leukocytosis  Hx of aspiration/regurgitation                 CXR findings more consistent with aspiration pneumonitis than aspiration PNA, however there is a possibility that pt vomitied due to developing PNA. Continue azithro/zosyn for now.  Wean O2 as tolerated  Continue tube feeds with close eye for signs of regurgitation  PPI  Aspiration precautions  Discussed plan with caretaker at bedside  Will see as needed over the weekend           Radiology  ( personally reviewed) Lung portion of CT abdomen reviewed: Infiltrates in lower lobes as well as RML and lingula  CXR reviewed: bilateral infiltrates   ABG Invalid input(s): \"PHI\", \"PO2I\", \"PCO2I\"       Subjective     Cc: hypoxia    59 yo with PMHX of cerebral palsy, recurrent aspiration with G/J PEG tube, GERD presenting with shortness of breath and hypoxia after vomiting episode that morning. Pt lives in a group home and is non-verbal.  Per care giver pt does have regurgitation episodes, however this was described as more rigorous vomiting. Pt was recently switched to J-tube feeds and per caregiver it has helped regurgitation episodes some. Caregiver denies fevers/chills/diarrhea. Started on azitrho/zosyn and on 3 liters. No further vomiting episodes.     Review of Systems:  Review of systems not obtained due to patient factors.    Past Medical History:   Diagnosis Date    Anxiety     Cerebral palsy (HCC)     Contracted, joint, multiple sites     upper extremities due to cerebral palsy    Developmental non-verbal disorder     Encephalopathy chronic     GERD (gastroesophageal reflux disease)     Hip dysplasia     Ill-defined condition     cerebral palsy    Intellectual disability     Mass of bladder     Microcephaly (HCC)     Muscle spasm

## 2024-08-09 NOTE — CONSULTS
Comprehensive Nutrition Assessment    Type and Reason for Visit:  Initial, Consult    Nutrition Recommendations/Plan:   NPO    Begin TF:    [employer] 1.5 Peptide @ 50 mL/hr x 18 hours (Goal volume 900 mL/day)  FWF 75 mL q 2 hours during feeds (Goal volume 675 mL)    Keep HOB 35-45 degrees during feeds  Begin daily children's MVI to meet micronutrient needs      Malnutrition Assessment:  Malnutrition Status:  Insufficient data (not relevant 2/2 contratures, cerebral palsy) (08/09/24 1107)    Context:  Acute Illness     Findings of the 6 clinical characteristics of malnutrition:  Energy Intake:  No significant decrease in energy intake  Weight Loss:  No significant weight loss     Body Fat Loss:  Unable to assess     Muscle Mass Loss:  Unable to assess    Fluid Accumulation:  No significant fluid accumulation     Strength:  Not Performed    Nutrition Assessment:     Patient is a 58 year old male admitted with Pneumonia due to organism [J18.9]  Severe sepsis (HCC) [A41.9, R65.20]  Pneumonia due to infectious organism, unspecified laterality, unspecified part of lung [J18.9]. He  has a past medical history of Anxiety, Cerebral palsy (HCC), Contracted, joint, multiple sites, Developmental non-verbal disorder, Encephalopathy chronic, GERD (gastroesophageal reflux disease), Hip dysplasia, Ill-defined condition, Intellectual disability, Mass of bladder, Microcephaly (HCC), and Muscle spasm.  RD consulted for TF orders & management. Familiar to services. Patient lives in group home; two caregivers at bedside provide all information.  UBW #, with recent steady weights of 94-96# since switching to J continuous feeds in June 2024. Has been tolerating [employer] 1.5 Peptide @ 50mL x 18 hours, stooling and voiding regularly. Takes nothing by mouth. Mild edema in BLE present on NFPE; no real fat or muscle stores 2/2 quadriplegia. See by Pulm who okay'ed resuming TF. Emesis PTA, none during admission. Jacque RANDLEL.    At

## 2024-08-09 NOTE — PLAN OF CARE
Problem: Safety - Adult  Goal: Free from fall injury  8/8/2024 2136 by Shane Payne, RN  Outcome: Progressing     Problem: Discharge Planning  Goal: Discharge to home or other facility with appropriate resources  8/8/2024 2136 by Shane Payne, RN  Outcome: Progressing     Problem: Pain  Goal: Verbalizes/displays adequate comfort level or baseline comfort level  Outcome: Progressing     Problem: Skin/Tissue Integrity  Goal: Absence of new skin breakdown  Description: 1.  Monitor for areas of redness and/or skin breakdown  2.  Assess vascular access sites hourly  3.  Every 4-6 hours minimum:  Change oxygen saturation probe site  4.  Every 4-6 hours:  If on nasal continuous positive airway pressure, respiratory therapy assess nares and determine need for appliance change or resting period.  Outcome: Progressing

## 2024-08-10 LAB
ANION GAP SERPL CALC-SCNC: 7 MMOL/L (ref 5–15)
BUN SERPL-MCNC: 15 MG/DL (ref 6–20)
BUN/CREAT SERPL: 23 (ref 12–20)
CALCIUM SERPL-MCNC: 9 MG/DL (ref 8.5–10.1)
CHLORIDE SERPL-SCNC: 107 MMOL/L (ref 97–108)
CO2 SERPL-SCNC: 27 MMOL/L (ref 21–32)
COMMENT:: NORMAL
CREAT SERPL-MCNC: 0.64 MG/DL (ref 0.7–1.3)
CRP SERPL-MCNC: 13.6 MG/DL (ref 0–0.3)
ERYTHROCYTE [DISTWIDTH] IN BLOOD BY AUTOMATED COUNT: 14.3 % (ref 11.5–14.5)
GLUCOSE SERPL-MCNC: 147 MG/DL (ref 65–100)
HCT VFR BLD AUTO: 43 % (ref 36.6–50.3)
HGB BLD-MCNC: 14.8 G/DL (ref 12.1–17)
MAGNESIUM SERPL-MCNC: 2.1 MG/DL (ref 1.6–2.4)
MCH RBC QN AUTO: 32.7 PG (ref 26–34)
MCHC RBC AUTO-ENTMCNC: 34.4 G/DL (ref 30–36.5)
MCV RBC AUTO: 94.9 FL (ref 80–99)
NRBC # BLD: 0 K/UL (ref 0–0.01)
NRBC BLD-RTO: 0 PER 100 WBC
NT PRO BNP: 730 PG/ML
PHOSPHATE SERPL-MCNC: 2.7 MG/DL (ref 2.6–4.7)
PLATELET # BLD AUTO: 197 K/UL (ref 150–400)
PMV BLD AUTO: 9.9 FL (ref 8.9–12.9)
POTASSIUM SERPL-SCNC: 3.7 MMOL/L (ref 3.5–5.1)
PROCALCITONIN SERPL-MCNC: 7.5 NG/ML
RBC # BLD AUTO: 4.53 M/UL (ref 4.1–5.7)
SODIUM SERPL-SCNC: 141 MMOL/L (ref 136–145)
SPECIMEN HOLD: NORMAL
WBC # BLD AUTO: 6.6 K/UL (ref 4.1–11.1)

## 2024-08-10 PROCEDURE — 6370000000 HC RX 637 (ALT 250 FOR IP): Performed by: STUDENT IN AN ORGANIZED HEALTH CARE EDUCATION/TRAINING PROGRAM

## 2024-08-10 PROCEDURE — 6370000000 HC RX 637 (ALT 250 FOR IP): Performed by: INTERNAL MEDICINE

## 2024-08-10 PROCEDURE — 85027 COMPLETE CBC AUTOMATED: CPT

## 2024-08-10 PROCEDURE — 83880 ASSAY OF NATRIURETIC PEPTIDE: CPT

## 2024-08-10 PROCEDURE — 86140 C-REACTIVE PROTEIN: CPT

## 2024-08-10 PROCEDURE — 80048 BASIC METABOLIC PNL TOTAL CA: CPT

## 2024-08-10 PROCEDURE — 94761 N-INVAS EAR/PLS OXIMETRY MLT: CPT

## 2024-08-10 PROCEDURE — 1100000000 HC RM PRIVATE

## 2024-08-10 PROCEDURE — 6360000002 HC RX W HCPCS: Performed by: INTERNAL MEDICINE

## 2024-08-10 PROCEDURE — 94640 AIRWAY INHALATION TREATMENT: CPT

## 2024-08-10 PROCEDURE — 84145 PROCALCITONIN (PCT): CPT

## 2024-08-10 PROCEDURE — 2700000000 HC OXYGEN THERAPY PER DAY

## 2024-08-10 PROCEDURE — 84100 ASSAY OF PHOSPHORUS: CPT

## 2024-08-10 PROCEDURE — 2580000003 HC RX 258: Performed by: STUDENT IN AN ORGANIZED HEALTH CARE EDUCATION/TRAINING PROGRAM

## 2024-08-10 PROCEDURE — 2580000003 HC RX 258: Performed by: INTERNAL MEDICINE

## 2024-08-10 PROCEDURE — 6370000000 HC RX 637 (ALT 250 FOR IP)

## 2024-08-10 PROCEDURE — 6360000002 HC RX W HCPCS: Performed by: STUDENT IN AN ORGANIZED HEALTH CARE EDUCATION/TRAINING PROGRAM

## 2024-08-10 PROCEDURE — 83735 ASSAY OF MAGNESIUM: CPT

## 2024-08-10 RX ORDER — LACTOBACILLUS RHAMNOSUS GG 10B CELL
1 CAPSULE ORAL
Status: DISCONTINUED | OUTPATIENT
Start: 2024-08-10 | End: 2024-08-12 | Stop reason: HOSPADM

## 2024-08-10 RX ADMIN — WATER 40 MG: 1 INJECTION INTRAMUSCULAR; INTRAVENOUS; SUBCUTANEOUS at 06:46

## 2024-08-10 RX ADMIN — BACLOFEN 10 MG: 10 TABLET ORAL at 21:53

## 2024-08-10 RX ADMIN — CETIRIZINE HYDROCHLORIDE 5 MG: 10 TABLET, FILM COATED ORAL at 08:36

## 2024-08-10 RX ADMIN — IPRATROPIUM BROMIDE AND ALBUTEROL SULFATE 1 DOSE: 2.5; .5 SOLUTION RESPIRATORY (INHALATION) at 19:32

## 2024-08-10 RX ADMIN — ENOXAPARIN SODIUM 30 MG: 100 INJECTION SUBCUTANEOUS at 08:37

## 2024-08-10 RX ADMIN — WATER 40 MG: 1 INJECTION INTRAMUSCULAR; INTRAVENOUS; SUBCUTANEOUS at 18:06

## 2024-08-10 RX ADMIN — OXYCODONE HYDROCHLORIDE 2.5 MG: 5 TABLET ORAL at 13:20

## 2024-08-10 RX ADMIN — PIPERACILLIN AND TAZOBACTAM 3375 MG: 3; .375 INJECTION, POWDER, LYOPHILIZED, FOR SOLUTION INTRAVENOUS at 22:02

## 2024-08-10 RX ADMIN — AZITHROMYCIN MONOHYDRATE 500 MG: 500 INJECTION, POWDER, LYOPHILIZED, FOR SOLUTION INTRAVENOUS at 11:16

## 2024-08-10 RX ADMIN — PIPERACILLIN AND TAZOBACTAM 3375 MG: 3; .375 INJECTION, POWDER, LYOPHILIZED, FOR SOLUTION INTRAVENOUS at 15:59

## 2024-08-10 RX ADMIN — Medication 2 MG: at 21:53

## 2024-08-10 RX ADMIN — BACLOFEN 10 MG: 10 TABLET ORAL at 08:36

## 2024-08-10 RX ADMIN — IPRATROPIUM BROMIDE AND ALBUTEROL SULFATE 1 DOSE: 2.5; .5 SOLUTION RESPIRATORY (INHALATION) at 07:14

## 2024-08-10 RX ADMIN — ACETAMINOPHEN 650 MG: 160 SOLUTION ORAL at 21:52

## 2024-08-10 RX ADMIN — BACLOFEN 10 MG: 10 TABLET ORAL at 13:19

## 2024-08-10 RX ADMIN — PIPERACILLIN AND TAZOBACTAM 3375 MG: 3; .375 INJECTION, POWDER, LYOPHILIZED, FOR SOLUTION INTRAVENOUS at 06:46

## 2024-08-10 RX ADMIN — MIDODRINE HYDROCHLORIDE 5 MG: 5 TABLET ORAL at 13:19

## 2024-08-10 RX ADMIN — Medication 1 CAPSULE: at 10:30

## 2024-08-10 RX ADMIN — ACETAMINOPHEN 650 MG: 160 SOLUTION ORAL at 08:37

## 2024-08-10 RX ADMIN — PANTOPRAZOLE SODIUM 40 MG: 40 INJECTION, POWDER, FOR SOLUTION INTRAVENOUS at 08:38

## 2024-08-10 RX ADMIN — MIDODRINE HYDROCHLORIDE 5 MG: 5 TABLET ORAL at 08:36

## 2024-08-10 RX ADMIN — MIDODRINE HYDROCHLORIDE 5 MG: 5 TABLET ORAL at 18:06

## 2024-08-10 RX ADMIN — SODIUM CHLORIDE, PRESERVATIVE FREE 10 ML: 5 INJECTION INTRAVENOUS at 08:39

## 2024-08-10 RX ADMIN — Medication 300 MG: at 08:37

## 2024-08-10 RX ADMIN — Medication 2 MG: at 08:39

## 2024-08-10 RX ADMIN — Medication 2 MG: at 15:59

## 2024-08-10 RX ADMIN — IPRATROPIUM BROMIDE AND ALBUTEROL SULFATE 1 DOSE: 2.5; .5 SOLUTION RESPIRATORY (INHALATION) at 14:41

## 2024-08-10 RX ADMIN — MIRTAZAPINE 7.5 MG: 15 TABLET, FILM COATED ORAL at 21:53

## 2024-08-10 NOTE — FLOWSHEET NOTE
08/10/24 0800   Handoff   Communication Given Shift Handoff  (Patient handoff given to oncoming nurse, no s/s of acute distress noted, pt resting in bed, standard safety precaution in place before exiting room. Oncoming nurse updated on current plan of care, patient caregiver at the bedside overnight.)   Handoff Given To Keyla/Arlet   Handoff Received From Lizzie SHEIKH RN   Handoff Communication Face to Face;At bedside   Time Handoff Given 0745

## 2024-08-10 NOTE — PLAN OF CARE
Problem: Safety - Adult  Goal: Free from fall injury  Outcome: Progressing     Problem: Discharge Planning  Goal: Discharge to home or other facility with appropriate resources  Outcome: Progressing  Flowsheets (Taken 8/9/2024 1930)  Discharge to home or other facility with appropriate resources:   Identify barriers to discharge with patient and caregiver   Arrange for needed discharge resources and transportation as appropriate   Identify discharge learning needs (meds, wound care, etc)   Refer to discharge planning if patient needs post-hospital services based on physician order or complex needs related to functional status, cognitive ability or social support system

## 2024-08-11 LAB
ANION GAP SERPL CALC-SCNC: 4 MMOL/L (ref 5–15)
BUN SERPL-MCNC: 13 MG/DL (ref 6–20)
BUN/CREAT SERPL: 21 (ref 12–20)
CALCIUM SERPL-MCNC: 9 MG/DL (ref 8.5–10.1)
CHLORIDE SERPL-SCNC: 109 MMOL/L (ref 97–108)
CO2 SERPL-SCNC: 28 MMOL/L (ref 21–32)
CREAT SERPL-MCNC: 0.63 MG/DL (ref 0.7–1.3)
CRP SERPL-MCNC: 4.5 MG/DL (ref 0–0.3)
GLUCOSE SERPL-MCNC: 118 MG/DL (ref 65–100)
MAGNESIUM SERPL-MCNC: 2.2 MG/DL (ref 1.6–2.4)
NT PRO BNP: 578 PG/ML
PHOSPHATE SERPL-MCNC: 3 MG/DL (ref 2.6–4.7)
POTASSIUM SERPL-SCNC: 3.7 MMOL/L (ref 3.5–5.1)
SODIUM SERPL-SCNC: 141 MMOL/L (ref 136–145)

## 2024-08-11 PROCEDURE — 6370000000 HC RX 637 (ALT 250 FOR IP): Performed by: INTERNAL MEDICINE

## 2024-08-11 PROCEDURE — 36415 COLL VENOUS BLD VENIPUNCTURE: CPT

## 2024-08-11 PROCEDURE — 6370000000 HC RX 637 (ALT 250 FOR IP)

## 2024-08-11 PROCEDURE — 83735 ASSAY OF MAGNESIUM: CPT

## 2024-08-11 PROCEDURE — 6360000002 HC RX W HCPCS: Performed by: INTERNAL MEDICINE

## 2024-08-11 PROCEDURE — 94761 N-INVAS EAR/PLS OXIMETRY MLT: CPT

## 2024-08-11 PROCEDURE — 80048 BASIC METABOLIC PNL TOTAL CA: CPT

## 2024-08-11 PROCEDURE — 94640 AIRWAY INHALATION TREATMENT: CPT

## 2024-08-11 PROCEDURE — 86140 C-REACTIVE PROTEIN: CPT

## 2024-08-11 PROCEDURE — 6370000000 HC RX 637 (ALT 250 FOR IP): Performed by: STUDENT IN AN ORGANIZED HEALTH CARE EDUCATION/TRAINING PROGRAM

## 2024-08-11 PROCEDURE — 6360000002 HC RX W HCPCS: Performed by: STUDENT IN AN ORGANIZED HEALTH CARE EDUCATION/TRAINING PROGRAM

## 2024-08-11 PROCEDURE — 84100 ASSAY OF PHOSPHORUS: CPT

## 2024-08-11 PROCEDURE — 83880 ASSAY OF NATRIURETIC PEPTIDE: CPT

## 2024-08-11 PROCEDURE — 1100000000 HC RM PRIVATE

## 2024-08-11 PROCEDURE — 2580000003 HC RX 258: Performed by: STUDENT IN AN ORGANIZED HEALTH CARE EDUCATION/TRAINING PROGRAM

## 2024-08-11 PROCEDURE — 2580000003 HC RX 258: Performed by: INTERNAL MEDICINE

## 2024-08-11 RX ORDER — TAMSULOSIN HYDROCHLORIDE 0.4 MG/1
0.4 CAPSULE ORAL DAILY
Status: DISCONTINUED | OUTPATIENT
Start: 2024-08-11 | End: 2024-08-11

## 2024-08-11 RX ORDER — PREDNISOLONE SODIUM PHOSPHATE 15 MG/5ML
40 SOLUTION ORAL DAILY
Status: DISCONTINUED | OUTPATIENT
Start: 2024-08-11 | End: 2024-08-12 | Stop reason: HOSPADM

## 2024-08-11 RX ORDER — SILODOSIN 4 MG/1
4 CAPSULE ORAL DAILY
Status: DISCONTINUED | OUTPATIENT
Start: 2024-08-11 | End: 2024-08-12 | Stop reason: HOSPADM

## 2024-08-11 RX ORDER — IPRATROPIUM BROMIDE AND ALBUTEROL SULFATE 2.5; .5 MG/3ML; MG/3ML
1 SOLUTION RESPIRATORY (INHALATION) EVERY 6 HOURS PRN
Status: DISCONTINUED | OUTPATIENT
Start: 2024-08-11 | End: 2024-08-12 | Stop reason: HOSPADM

## 2024-08-11 RX ORDER — FAMOTIDINE 40 MG/5ML
20 POWDER, FOR SUSPENSION ORAL 2 TIMES DAILY
Status: DISCONTINUED | OUTPATIENT
Start: 2024-08-11 | End: 2024-08-12 | Stop reason: HOSPADM

## 2024-08-11 RX ADMIN — ACETAMINOPHEN 650 MG: 160 SOLUTION ORAL at 22:22

## 2024-08-11 RX ADMIN — Medication 2 MG: at 08:42

## 2024-08-11 RX ADMIN — AZITHROMYCIN MONOHYDRATE 500 MG: 500 INJECTION, POWDER, LYOPHILIZED, FOR SOLUTION INTRAVENOUS at 10:55

## 2024-08-11 RX ADMIN — CETIRIZINE HYDROCHLORIDE 5 MG: 10 TABLET, FILM COATED ORAL at 08:41

## 2024-08-11 RX ADMIN — MIDODRINE HYDROCHLORIDE 5 MG: 5 TABLET ORAL at 11:39

## 2024-08-11 RX ADMIN — MIDODRINE HYDROCHLORIDE 5 MG: 5 TABLET ORAL at 17:49

## 2024-08-11 RX ADMIN — PIPERACILLIN AND TAZOBACTAM 3375 MG: 3; .375 INJECTION, POWDER, LYOPHILIZED, FOR SOLUTION INTRAVENOUS at 14:42

## 2024-08-11 RX ADMIN — MIDODRINE HYDROCHLORIDE 5 MG: 5 TABLET ORAL at 08:42

## 2024-08-11 RX ADMIN — SODIUM CHLORIDE, PRESERVATIVE FREE 10 ML: 5 INJECTION INTRAVENOUS at 22:24

## 2024-08-11 RX ADMIN — PIPERACILLIN AND TAZOBACTAM 3375 MG: 3; .375 INJECTION, POWDER, LYOPHILIZED, FOR SOLUTION INTRAVENOUS at 22:30

## 2024-08-11 RX ADMIN — ENOXAPARIN SODIUM 30 MG: 100 INJECTION SUBCUTANEOUS at 08:43

## 2024-08-11 RX ADMIN — IPRATROPIUM BROMIDE AND ALBUTEROL SULFATE 1 DOSE: 2.5; .5 SOLUTION RESPIRATORY (INHALATION) at 07:07

## 2024-08-11 RX ADMIN — PREDNISOLONE SODIUM PHOSPHATE 40 MG: 15 SOLUTION ORAL at 08:47

## 2024-08-11 RX ADMIN — Medication 2 MG: at 14:41

## 2024-08-11 RX ADMIN — Medication 20 MG: at 11:38

## 2024-08-11 RX ADMIN — PIPERACILLIN AND TAZOBACTAM 3375 MG: 3; .375 INJECTION, POWDER, LYOPHILIZED, FOR SOLUTION INTRAVENOUS at 08:21

## 2024-08-11 RX ADMIN — Medication 300 MG: at 08:42

## 2024-08-11 RX ADMIN — Medication 2 MG: at 22:22

## 2024-08-11 RX ADMIN — BACLOFEN 10 MG: 10 TABLET ORAL at 22:23

## 2024-08-11 RX ADMIN — Medication 20 MG: at 22:22

## 2024-08-11 RX ADMIN — PANTOPRAZOLE SODIUM 40 MG: 40 INJECTION, POWDER, FOR SOLUTION INTRAVENOUS at 08:43

## 2024-08-11 RX ADMIN — BACLOFEN 10 MG: 10 TABLET ORAL at 14:41

## 2024-08-11 RX ADMIN — OXYCODONE HYDROCHLORIDE 2.5 MG: 5 TABLET ORAL at 11:36

## 2024-08-11 RX ADMIN — Medication 1 CAPSULE: at 08:42

## 2024-08-11 RX ADMIN — MIRTAZAPINE 7.5 MG: 15 TABLET, FILM COATED ORAL at 22:23

## 2024-08-11 RX ADMIN — ACETAMINOPHEN 650 MG: 160 SOLUTION ORAL at 08:43

## 2024-08-11 RX ADMIN — BACLOFEN 10 MG: 10 TABLET ORAL at 08:42

## 2024-08-11 RX ADMIN — SILODOSIN 4 MG: 4 CAPSULE ORAL at 11:36

## 2024-08-11 ASSESSMENT — PAIN - FUNCTIONAL ASSESSMENT
PAIN_FUNCTIONAL_ASSESSMENT: ACTIVITIES ARE NOT PREVENTED
PAIN_FUNCTIONAL_ASSESSMENT: ACTIVITIES ARE NOT PREVENTED

## 2024-08-11 ASSESSMENT — PAIN SCALES - WONG BAKER: WONGBAKER_NUMERICALRESPONSE: NO HURT

## 2024-08-11 NOTE — PLAN OF CARE
Problem: Safety - Adult  Goal: Free from fall injury  Outcome: Progressing     Problem: Discharge Planning  Goal: Discharge to home or other facility with appropriate resources  Outcome: Progressing     Problem: Pain  Goal: Verbalizes/displays adequate comfort level or baseline comfort level  Outcome: Progressing     Problem: Skin/Tissue Integrity  Goal: Absence of new skin breakdown  Description: 1.  Monitor for areas of redness and/or skin breakdown  2.  Assess vascular access sites hourly  3.  Every 4-6 hours minimum:  Change oxygen saturation probe site  4.  Every 4-6 hours:  If on nasal continuous positive airway pressure, respiratory therapy assess nares and determine need for appliance change or resting period.  Outcome: Progressing     Problem: Nutrition Deficit:  Goal: Optimize nutritional status  Outcome: Progressing     Problem: ABCDS Injury Assessment  Goal: Absence of physical injury  Outcome: Progressing     Problem: Respiratory - Adult  Goal: Achieves optimal ventilation and oxygenation  Outcome: Progressing

## 2024-08-11 NOTE — PLAN OF CARE
Problem: Safety - Adult  Goal: Free from fall injury  8/11/2024 1904 by Arlet Horvath RN  Outcome: Progressing  8/11/2024 0841 by Mya Workman RN  Outcome: Progressing  Flowsheets (Taken 8/10/2024 2045)  Free From Fall Injury: Instruct family/caregiver on patient safety     Problem: Discharge Planning  Goal: Discharge to home or other facility with appropriate resources  8/11/2024 1904 by Arlet Horvath RN  Outcome: Progressing  8/11/2024 0841 by Mya Workman RN  Outcome: Progressing  Flowsheets (Taken 8/10/2024 2045)  Discharge to home or other facility with appropriate resources: Identify barriers to discharge with patient and caregiver     Problem: Pain  Goal: Verbalizes/displays adequate comfort level or baseline comfort level  8/11/2024 0841 by Mya Workman RN  Outcome: Progressing     Problem: Skin/Tissue Integrity  Goal: Absence of new skin breakdown  Description: 1.  Monitor for areas of redness and/or skin breakdown  2.  Assess vascular access sites hourly  3.  Every 4-6 hours minimum:  Change oxygen saturation probe site  4.  Every 4-6 hours:  If on nasal continuous positive airway pressure, respiratory therapy assess nares and determine need for appliance change or resting period.  8/11/2024 1904 by Arlet Horvath RN  Outcome: Progressing  8/11/2024 0841 by Mya Workman RN  Outcome: Progressing     Problem: Nutrition Deficit:  Goal: Optimize nutritional status  8/11/2024 1904 by Arlet Horvath RN  Outcome: Progressing  8/11/2024 0841 by Mya Workman RN  Outcome: Progressing     Problem: ABCDS Injury Assessment  Goal: Absence of physical injury  8/11/2024 1904 by Arlet Horvath RN  Outcome: Progressing  8/11/2024 0841 by Mya Workman RN  Outcome: Progressing     Problem: Respiratory - Adult  Goal: Achieves optimal ventilation and oxygenation  8/11/2024 1904 by Arlet Horvath RN  Outcome: Progressing  8/11/2024 0841 by Mya Workman RN  Outcome: Progressing  Flowsheets (Taken

## 2024-08-12 VITALS
OXYGEN SATURATION: 99 % | SYSTOLIC BLOOD PRESSURE: 128 MMHG | TEMPERATURE: 98.1 F | WEIGHT: 94 LBS | RESPIRATION RATE: 18 BRPM | DIASTOLIC BLOOD PRESSURE: 74 MMHG | BODY MASS INDEX: 18.46 KG/M2 | HEIGHT: 60 IN | HEART RATE: 93 BPM

## 2024-08-12 PROCEDURE — 6370000000 HC RX 637 (ALT 250 FOR IP): Performed by: INTERNAL MEDICINE

## 2024-08-12 PROCEDURE — 94761 N-INVAS EAR/PLS OXIMETRY MLT: CPT

## 2024-08-12 PROCEDURE — 6360000002 HC RX W HCPCS: Performed by: STUDENT IN AN ORGANIZED HEALTH CARE EDUCATION/TRAINING PROGRAM

## 2024-08-12 PROCEDURE — 6360000002 HC RX W HCPCS: Performed by: INTERNAL MEDICINE

## 2024-08-12 PROCEDURE — 6370000000 HC RX 637 (ALT 250 FOR IP)

## 2024-08-12 PROCEDURE — 6370000000 HC RX 637 (ALT 250 FOR IP): Performed by: STUDENT IN AN ORGANIZED HEALTH CARE EDUCATION/TRAINING PROGRAM

## 2024-08-12 PROCEDURE — 2580000003 HC RX 258: Performed by: STUDENT IN AN ORGANIZED HEALTH CARE EDUCATION/TRAINING PROGRAM

## 2024-08-12 PROCEDURE — 2580000003 HC RX 258: Performed by: INTERNAL MEDICINE

## 2024-08-12 RX ORDER — AMOXICILLIN AND CLAVULANATE POTASSIUM 400; 57 MG/5ML; MG/5ML
400 POWDER, FOR SUSPENSION ORAL EVERY 12 HOURS SCHEDULED
Qty: 30 ML | Refills: 0 | Status: SHIPPED | OUTPATIENT
Start: 2024-08-12 | End: 2024-08-15

## 2024-08-12 RX ORDER — AMOXICILLIN AND CLAVULANATE POTASSIUM 400; 57 MG/5ML; MG/5ML
400 POWDER, FOR SUSPENSION ORAL EVERY 12 HOURS SCHEDULED
Status: DISCONTINUED | OUTPATIENT
Start: 2024-08-12 | End: 2024-08-12 | Stop reason: HOSPADM

## 2024-08-12 RX ORDER — MIDODRINE HYDROCHLORIDE 5 MG/1
5 TABLET ORAL
Qty: 90 TABLET | Refills: 0 | Status: SHIPPED
Start: 2024-08-12 | End: 2024-09-11

## 2024-08-12 RX ORDER — LACTOBACILLUS RHAMNOSUS GG 10B CELL
1 CAPSULE ORAL
Qty: 30 CAPSULE | Refills: 0 | Status: SHIPPED | OUTPATIENT
Start: 2024-08-13 | End: 2024-09-12

## 2024-08-12 RX ADMIN — BACLOFEN 10 MG: 10 TABLET ORAL at 14:32

## 2024-08-12 RX ADMIN — SODIUM CHLORIDE, PRESERVATIVE FREE 10 ML: 5 INJECTION INTRAVENOUS at 09:17

## 2024-08-12 RX ADMIN — Medication 2 MG: at 14:33

## 2024-08-12 RX ADMIN — SILODOSIN 4 MG: 4 CAPSULE ORAL at 09:14

## 2024-08-12 RX ADMIN — MIDODRINE HYDROCHLORIDE 5 MG: 5 TABLET ORAL at 09:15

## 2024-08-12 RX ADMIN — Medication 20 MG: at 14:33

## 2024-08-12 RX ADMIN — PIPERACILLIN AND TAZOBACTAM 3375 MG: 3; .375 INJECTION, POWDER, LYOPHILIZED, FOR SOLUTION INTRAVENOUS at 06:04

## 2024-08-12 RX ADMIN — CETIRIZINE HYDROCHLORIDE 5 MG: 10 TABLET, FILM COATED ORAL at 09:15

## 2024-08-12 RX ADMIN — OXYCODONE HYDROCHLORIDE 2.5 MG: 5 TABLET ORAL at 16:34

## 2024-08-12 RX ADMIN — Medication 300 MG: at 09:15

## 2024-08-12 RX ADMIN — AMOXICILLIN AND CLAVULANATE POTASSIUM 400 MG: 400; 57 POWDER, FOR SUSPENSION ORAL at 14:33

## 2024-08-12 RX ADMIN — BACLOFEN 10 MG: 10 TABLET ORAL at 09:15

## 2024-08-12 RX ADMIN — Medication 1 CAPSULE: at 09:15

## 2024-08-12 RX ADMIN — ACETAMINOPHEN 650 MG: 160 SOLUTION ORAL at 09:15

## 2024-08-12 RX ADMIN — Medication 2 MG: at 09:14

## 2024-08-12 RX ADMIN — ENOXAPARIN SODIUM 30 MG: 100 INJECTION SUBCUTANEOUS at 09:15

## 2024-08-12 RX ADMIN — PREDNISOLONE SODIUM PHOSPHATE 40 MG: 15 SOLUTION ORAL at 09:16

## 2024-08-12 RX ADMIN — AZITHROMYCIN MONOHYDRATE 500 MG: 500 INJECTION, POWDER, LYOPHILIZED, FOR SOLUTION INTRAVENOUS at 12:14

## 2024-08-12 NOTE — DISCHARGE SUMMARY
ipratropium 0.5 mg-albuterol 2.5 mg (DUONEB) 0.5-2.5 (3) MG/3ML SOLN nebulizer solution Inhale 3 mLs into the lungs every 4 hours as needed for Shortness of Breath or Wheezing  Qty: 360 mL, Refills: 1      LORazepam (ATIVAN) 2 MG/ML concentrated solution 1 mL by Per G Tube route every 6 hours as needed for Agitation.      glycopyrrolate (CUVPOSA) 1 MG/5ML SOLN solution GIVE 5ML (1MG) VIA PEG TUBE EVERY DAY FOR EXCESSIVE SALIVA ** REORDER WHEN LOW **  Qty: 150 mL, Refills: 5      vitamin D (CHOLECALCIFEROL) 50 MCG (2000 UT) TABS tablet TAKE ONE TABLET CRUSHED BY PEG TUBE DAILY FOR SUPPLEMENT  Qty: 29 tablet, Refills: 5      risperiDONE (RISPERDAL) 1 MG/ML oral solution GIVE 1ML BY G-TUBE 3 TIMES A DAY FOR ANXIETY, LASHING OUT, KICKING, THROWING SELF OUT OF WHEELCHAIR AGGRESSIVELY, OR YELLING.  Qty: 90 mL, Refills: 0      baclofen (LIORESAL) 10 MG tablet TAKE ONE TABLET 3 TIMES A DAY CRUSHED PER PEG TUBE FOR MUSCLE SPASMS  Qty: 93 tablet, Refills: 0      OMEPRAZOLE PO 40 mg by Enteral route daily           STOP taking these medications       pantoprazole (PROTONIX) 40 MG tablet Comments:   Reason for Stopping:         sucralfate (CARAFATE) 1 GM/10ML suspension Comments:   Reason for Stopping:         loratadine (CLARITIN) 10 MG tablet Comments:   Reason for Stopping:         azelastine (ASTELIN) 0.1 % nasal spray Comments:   Reason for Stopping:         ipratropium (ATROVENT) 0.03 % nasal spray Comments:   Reason for Stopping:         risperiDONE (RISPERDAL) 1 MG tablet Comments:   Reason for Stopping:             Activity: activity as tolerated  Diet:  resume J tube feedings  Wound Care: keep wound clean and dry, reinforce dressing PRN, and as directed    Follow-up with your PCP in a few weeks.  Follow-up tests/labs - none    Signed:  Santino Estevez MD  8/12/2024  12:58 PM  
9

## 2024-08-12 NOTE — DISCHARGE INSTRUCTIONS
HOSPITALIST DISCHARGE INSTRUCTIONS  NAME:  Ayden Simpson   :  1965   MRN:  [id number]     Date/Time:  2024 1:00 PM    ADMIT DATE: 2024     DISCHARGE DATE: 2024     DISCHARGE DIAGNOSIS:  Acute respiratory failure with hypoxia    DISCHARGE INSTRUCTIONS:  Thank you for allowing us to participate in your care. Your discharging Hospitalist is Santino Estevez MD. You were admitted for evaluation and treatment of the following:    Acute respiratory failure with hypoxia - This is due to aspiration pneumonia.  We consulted pulmonary.  You no longer need to be on oxygen and steroids.     Aspiration pneumonia - This is recurrent and due to J-Tube feeds.  We expect this to be a recurring problem. Complete 7 days Augmentin.     Severe sepsis - This was due to aspiration pneumonia.      Severe pro-alvina malnutrition / Hypokalemia / Hypophosphatemia / Hypomagnesemia - You had J-tube placed on  and revised .  Continue tube feed per Nutrition      Patent foramen ovale - Asymptomatic incidental finding on echo during last admission.  I would not advise any aggressive workup     Urine retention - This was a temporary issue.  Outpatient urology followup only if this becomes symptomatic.     Cerebral palsy - This is severe. Continue baclofen, risperidone, ativan, frequent turns     MEDICATIONS:    It is important that you take the medication exactly as they are prescribed.   Keep your medication in the bottles provided by the pharmacist and keep a list of the medication names, dosages, and times to be taken in your wallet.   Do not take other medications without consulting your doctor.     If you experience any of the following symptoms then please call your primary care physician or return to the emergency room if you cannot get hold of your doctor:  Fever, chills, nausea, vomiting, diarrhea, change in mentation, falling, bleeding, shortness of breath    Follow Up:  Please call the below provider to

## 2024-08-12 NOTE — ACP (ADVANCE CARE PLANNING)
Primary Decision Maker: Alejandra Hicks - Legal Guardian, Legal Guardian - [phone]    Pt has documentation on file stating that [facility] Public Guardianship program was appointed by the court as legal guardian for pt on 5/7/2008.  Alejandra Hicks serves as pt's assigned guardian through [facility].

## 2024-08-12 NOTE — PLAN OF CARE
Problem: Safety - Adult  Goal: Free from fall injury  8/12/2024 1709 by Starr Dubon RN  Outcome: Adequate for Discharge  8/12/2024 1709 by Starr Dubno RN  Outcome: Adequate for Discharge  8/12/2024 1024 by Starr Dubon RN  Outcome: Progressing     Problem: Discharge Planning  Goal: Discharge to home or other facility with appropriate resources  8/12/2024 1709 by Starr Dubon RN  Outcome: Adequate for Discharge  8/12/2024 1024 by tSarr Dubon RN  Outcome: Progressing     Problem: Pain  Goal: Verbalizes/displays adequate comfort level or baseline comfort level  8/12/2024 1709 by Starr Dubon RN  Outcome: Adequate for Discharge  8/12/2024 1024 by Starr Dubon RN  Outcome: Progressing     Problem: Skin/Tissue Integrity  Goal: Absence of new skin breakdown  Description: 1.  Monitor for areas of redness and/or skin breakdown  2.  Assess vascular access sites hourly  3.  Every 4-6 hours minimum:  Change oxygen saturation probe site  4.  Every 4-6 hours:  If on nasal continuous positive airway pressure, respiratory therapy assess nares and determine need for appliance change or resting period.  8/12/2024 1709 by Starr Dubon RN  Outcome: Adequate for Discharge  8/12/2024 1024 by Starr Dubon RN  Outcome: Progressing     Problem: Nutrition Deficit:  Goal: Optimize nutritional status  8/12/2024 1709 by Starr Dubon RN  Outcome: Adequate for Discharge  Flowsheets (Taken 8/12/2024 1214 by Kell Lu, RD)  Nutrient intake appropriate for improving, restoring, or maintaining nutritional needs: Recommend, monitor, and adjust tube feedings and TPN/PPN based on assessed needs  8/12/2024 1024 by Starr Dubon RN  Outcome: Progressing     Problem: ABCDS Injury Assessment  Goal: Absence of physical injury  8/12/2024 1709 by Starr Dubon RN  Outcome: Adequate for Discharge  8/12/2024 1024 by Starr Dubon RN  Outcome: Progressing     Problem: Respiratory - Adult  Goal: Achieves optimal

## 2024-08-12 NOTE — PROGRESS NOTES
[facility]    Hospitalist Progress Note    NAME: Ayden Simpson   :  1965  MRM:  [id number]    Date/Time of service 2024  8:09 AM    To assist coordination of care and communication with nursing and staff, this note may be preliminary early in the day, but finalized by end of the day.        Assessment and Plan:     Acute respiratory failure with hypoxia - JUNIE, recurrent, Due to aspiration pneumonia.  Consulted pulmonary.  Weaned off oxygen and steroids.     Aspiration pneumonia - POA, recurrent.  Due to Tube feeds.  Expect this to be a recurring problem. Complete 7 days Augmentin and Azithromycin.    Severe sepsis - POA, now resolved.  Due to aspiration pneumonia. NGTD on blood cultures.     Severe pro-alvina malnutrition / Hypokalemia / Hypophosphatemia / Hypomagnesemia - POA, had J-tube placed on .  Revision of J-tube on  by GI.  Continue tube feed per Nutrition      Patent foramen ovale - Asymptomatic incidental finding on echo during last admission.  I would not advise aggressive workup    Urine retention - New issue. Required straight cath. Add flomax.  Outpatient urology followup     Cerebral palsy - POA, severe. Lives in group home.  Continue baclofen, risperidone, ativan, frequent turns        Subjective:     Chief Complaint:  no events, looks better, required straight cath    ROS:  (bold if positive, if negative)    Tolerating usual PT  Tolerating J tube Diet        Objective:     Last 24hrs VS reviewed since prior progress note. Most recent are:    Vitals:    24 1923 24 0024 24 0404 24 0759   BP: 124/62 114/67 118/66 (!) 111/58   Pulse: 55 53 55 72   Resp: 17 18 17    Temp: 98.1 °F (36.7 °C) 98.2 °F (36.8 °C) 98.3 °F (36.8 °C) 97.7 °F (36.5 °C)   TempSrc: Oral Oral Oral    SpO2: 97% 98% 97% 98%   Weight:       Height:          @[id number]@       Intake/Output Summary (Last 24 hours) at 2024 0809  Last data filed at 2024 
0811 reached out to attending via secure message to request a probiotic. Patient has started tube feeding along with antibiotics and has developed runny poops.    
1647 reached out to attending via secure message to request a change in the route of the Robinul and adding a possible diuretic to the patients current regiment.  
2240-The overnight provider and nursing supervisor in made aware with the concerns of patient caregivers. He is non-verbal, on tube feeding until midnight and very high aspiration risk. Provider has not responded to message at this time, door open for close observation.    2255: Writer received follow up message from the overnight provider, to which he decline to order a sitter at this time. Patient door will remain open and will frequently monitor patient, for any changes in condition.    0600: Patient had an uneventful, no s/s of acute distress note, tube feeding restarted, 4Ps address, resting in bed with eyes open, safety precaution in place before exiting to room.  
Nutrition Assessment     Type and Reason for Visit: Reassess    Nutrition Recommendations/Plan:   Continue TF as ordered: Ana Osborne Peptide 1.5 @50 mL x 18 hours, FWF 75 mL q 2 hours during feeds.     Keep HOB 35-45 degrees during feeds and for 1 hour after stopping    Daily children's MVI via G-tube to meet micronutrient needs       At goal: 50 mL x 18 hours, 900 mL of tube feed provides 1384 kcal (100% needs); 125 g carbs w 13.8 g fiber; 66 g protein. TF + FWF provides 1365 mL H2O/day.     Malnutrition Assessment:  Malnutrition Status: Insufficient data (not relevant 2/2 contratures, cerebral palsy)    Nutrition Assessment:    8/12: follow up. Pt tolerating TF & flushes at goal. No reports of emesis. No new measured weight and weight is stated from admission. Pt is familiar to this RD. No physical indications of inadequate nutritional intake. Skin is intact, healthy hair growth and nails. Hospital supply of specialized TF is limited and will run out today. Caregiver is willing to bring in TF formula from home if patient is to remain in-patient status for >24 hours.       Estimated Daily Nutrient Needs:  Energy (kcal):  1275 (30 kcal/kg) Weight Used for Energy Requirements: Current     Protein (g):  42-51 (1-1.2 g/kg) Weight Used for Protein Requirements: Current        Fluid (ml/day):  1275 Method Used for Fluid Requirements: 1 ml/kcal    Nutrition Related Findings:     Wound Type: None    Last Weight Metrics:      8/9/2024     8:23 AM 8/8/2024     7:41 AM 7/1/2024    11:11 PM 6/26/2024    10:53 AM 6/26/2024     6:00 AM 6/25/2024     9:16 AM 6/24/2024     1:09 PM   Weight Loss Metrics   Height 4' 9\"  4' 9\" 4' 9.992\"   4' 9.992\"   Weight - Scale  94 lbs 89 lbs 100 lbs 5 oz 100 lbs 5 oz 99 lbs 10 oz    BMI (Calculated)  0 kg/m2 19.3 kg/m2 21 kg/m2 21 kg/m2 20.9 kg/m2      Current Nutrition Therapies:    ADULT TUBE FEEDING; PEG/J; Other Tube Feeding (specify); Ana Farms 1.5 Peptide Based; Cyclic; 50; 6:00 AM; 12:00 
[facility]UL[facility]ARY [facility]OC[facility]     Name: Ayden Simpson MRN: [id number]   : 1965 Hospital: [facility]   Date: 2024        Impression Plan   Acute respiratory failure  Hypoxia  Aspiration pneumonitis/PNA: CXR findings more consistent with aspiration pneumonitis than aspiration PNA, however there is a possibility that pt vomitied due to developing PNA.   Leukocytosis  Hx of aspiration/regurgitation                 Continue azithro/Augmentin to complete 7 day course  Off O2  Continue tube feeds with close eye for signs of regurgitation  PPI  Aspiration precautions    Patient is stable from a pulmonary standpoint.  We will sign off and arrange for outpatient pulmonary follow up in a couple weeks.  Please call with questions.             Radiology  (personally reviewed) Lung portion of CT abdomen reviewed: Infiltrates in lower lobes as well as RML and lingula  CXR reviewed: bilateral infiltrates       Subjective     Cc: hypoxia    59 yo with PMHX of cerebral palsy, recurrent aspiration with G/J PEG tube, GERD presenting with shortness of breath and hypoxia after vomiting episode that morning. Pt lives in a group home and is non-verbal.  Per care giver pt does have regurgitation episodes, however this was described as more rigorous vomiting. Pt was recently switched to J-tube feeds and per caregiver it has helped regurgitation episodes some. Caregiver denies fevers/chills/diarrhea. Started on azitrho/zosyn and on 3 liters. No further vomiting episodes.     Interval history  Afebrile  BP stable  Sats 98% on RA  CRP 4.5 - decreased  WBC 6.6 - decreased  8/8 blood cx no growth x 3 days      Review of Systems: Nonverbal.  Does make eye contact, smile, and grunts.  Review of systems not obtained due to patient factors.    Past Medical History:   Diagnosis Date    Anxiety     Cerebral palsy (HCC)     Contracted, joint, multiple sites     upper extremities due to cerebral palsy    Developmental 
Patient has not voided since I arrived at 7pm 08/10/24. Patient was able to void on his own at 0500 220ml of urine via male wick. Bladder scanned patient post void; 500ml noted in the bladder. Contacted [facility] on call. Straight cath ordered.     0730 Attempted to straight cath patient met resistance. 16F coude cath used. 500 ml of drained from the bladder.   
Pt's caregiver called nurses station for RN to come into room to help the pt because \"he doesn't look too comfortable\". This RN came into room and saw pt breathing at a normal rate without showing any labored breathing or grimace. Pt is NSR on tele and saturating at 99% SpO2 via 3L nasal cannula. While using the Walton-Baker pain scale, pt did not appear to be in discomfort or pain. This RN suggested repositioning pt up higher in bed to help with comfort, administering prn pain medication tylenol, and said she would notify provider. RN also suggested consulting RT for nebulizer breathing treatment but caregiver wanted to let him continue to sleep, and not wake him since he gets it every 6 hours. This RN said to caregiver, \"I don't think anything is acutely wrong but I will notify the provider.\" Caregiver started getting hostile, clapping hands, snapping fingers, raising voice with pt in the room and said to RN, \"I don't know how long you have been a nurse, but I am a nurse and I know the classic signs of being uncomfortable\"  This RN repeated that she will notify the provider and apply the nursing interventions she is able to do. This RN also consulted Rapid response nurse to come assess pt as a second set of eyes.     Pt's caregiver has been also compaining at beginning of shift to RN saying that pt was admitted a month ago for the same diagnosis and she does not believe our interventions with antibiotics and breathing treatments are enough. She also mentioned that she thinks he has secretions in his chest that are not coming up into his mouth so she has been trying to suction with the bulb yankaur. This RN told caregiver to not do that because she can cause the pt to aspirate.   
RITO VALENZUELA [facility]  [address] [place] [address] [phone]        Hospitalist Progress Note      NAME: Ayden Simpson   :  1965  MRM:  [id number]    Date/Time of service: 2024  11:37 AM       Subjective:     Chief Complaint:  Patient was personally seen and examined by me during this time period.  Chart reviewed.  Dyspnea is better.  Spoke to care taker at bedside       Objective:       Vitals:       Last 24hrs VS reviewed since prior progress note. Most recent are:    Vitals:    24 1126   BP: 113/63   Pulse: 74   Resp: 18   Temp: 97.7 °F (36.5 °C)   SpO2:      SpO2 Readings from Last 6 Encounters:   24 99%   24 96%   24 95%   24 94%   24 97%          Intake/Output Summary (Last 24 hours) at 2024 1137  Last data filed at 2024 0600  Gross per 24 hour   Intake --   Output 450 ml   Net -450 ml        Exam:     Physical Exam:    Gen:  frail, ill-appearing, NAD  HEENT:  Pink conjunctivae, PERRL, hearing intact to voice, dry mucous membranes  Neck:  Supple, without masses, thyroid non-tender  Resp:  No accessory muscle use, some rhonchi at bases  Card:  No murmurs, normal S1, S2 without thrills, bruits or peripheral edema  Abd:  Soft, non-tender, non-distended, normoactive bowel sounds are present, has J-tube  Musc:  contracted   Skin:  No rashes  Neuro:  moves all ext  Psych:  no insight     Medications Reviewed: (see below)    Lab Data Reviewed: (see below)    ______________________________________________________________________    Medications:     Current Facility-Administered Medications   Medication Dose Route Frequency    ipratropium 0.5 mg-albuterol 2.5 mg (DUONEB) nebulizer solution 1 Dose  1 Dose Inhalation Q6H WA RT    oxyCODONE (ROXICODONE) immediate release tablet 2.5 mg  2.5 mg PEG Tube Q6H PRN    And    acetaminophen (TYLENOL) 160 MG/5ML solution 160 mg  160 mg PEG Tube Q6H PRN    lactated ringers IV soln infusion 
Spiritual Health Assessment/Progress Note  [facility]    Initial Encounter,  ,  ,      Name: Ayden Simpson MRN: [id number]    Age: 58 y.o.     Sex: male   Language: English   Mormon: Other   Pneumonia of both lower lobes due to infectious organism     Date: 8/9/2024            Total Time Calculated: 53 min              Spiritual Assessment began in [facility] A3 MULTI-SPECIALTY TELEMETRY        Referral/Consult From: Other    Encounter Overview/Reason: Initial Encounter  Service Provided For: Patient (Caregiver)    Ginette, Belief, Meaning:   Patient unable to communicate at this time  Family/Friends Other: Caregiver present. His name is Hernando. Patient's  other caregiver is named Mitzi. The two caregivers are .       Importance and Influence:  Patient unable to communicate at this time  Family/Friends have spiritual/personal beliefs that influence decisions regarding the patient's health    Community:  Patient Other: Support from caregivers and has a community at his group home.   Family/Friends are connected with a spiritual community:    Assessment and Plan of Care:     Patient Interventions include: Other: Prayer for patient.  Family/Friends Interventions include: Other: Unknown    Patient Plan of Care: Spiritual Care available upon further referral  Family/Friends Plan of Care: Spiritual Care available upon further referral    Chart review. I was given referral to provide visitation to patient. Patient is non verbal. Patient enjoys watching tv. Patient lives in a group home. Patient's caregiver was present at bedside. Per care giver, the patient is feeling a little better. Patient has no affiliation in spirituality. Provided general prayers of healing. Patient's caregiver was thankful I stopped by. I shook the caregiver's hand and on the way out, the patient smiled which made my day.     Electronically signed by Chaplain Hawa on 8/9/2024 at 3:26 PM    
This RN notified RT about pt's neb treatment that was due at 0000. RT stated he did not want to wake up pt for the treatment.  
acetaminophen (TYLENOL) 160 MG/5ML solution 160 mg  160 mg PEG Tube Q6H PRN    methylPREDNISolone sodium succ (SOLU-MEDROL) 40 mg in sterile water 1 mL injection  40 mg IntraVENous Q12H    glycopyrrolate (ROBINUL) injection 0.1 mg  0.1 mg IntraVENous Daily PRN    piperacillin-tazobactam (ZOSYN) 3,375 mg in sodium chloride 0.9 % 50 mL IVPB (mini-bag)  3,375 mg IntraVENous Q8H    sodium chloride flush 0.9 % injection 5-40 mL  5-40 mL IntraVENous 2 times per day    sodium chloride flush 0.9 % injection 5-40 mL  5-40 mL IntraVENous PRN    0.9 % sodium chloride infusion   IntraVENous PRN    enoxaparin Sodium (LOVENOX) injection 30 mg  30 mg SubCUTAneous Daily    ondansetron (ZOFRAN-ODT) disintegrating tablet 4 mg  4 mg Oral Q8H PRN    Or    ondansetron (ZOFRAN) injection 4 mg  4 mg IntraVENous Q6H PRN    polyethylene glycol (GLYCOLAX) packet 17 g  17 g Oral Daily PRN    acetaminophen (TYLENOL) tablet 650 mg  650 mg Oral Q6H PRN    Or    acetaminophen (TYLENOL) suppository 650 mg  650 mg Rectal Q6H PRN    baclofen (LIORESAL) tablet 10 mg  10 mg PEG Tube TID    bisacodyl (DULCOLAX) suppository 10 mg  10 mg Rectal Daily PRN    guaiFENesin (ROBITUSSIN) 100 MG/5ML liquid 200 mg  200 mg Per G Tube 4x Daily PRN    cetirizine (ZYRTEC) tablet 5 mg  5 mg PEG Tube Daily    LORazepam (ATIVAN) 2 MG/ML concentrated solution 2 mg  2 mg Per G Tube Q6H PRN    mirtazapine (REMERON) tablet 7.5 mg  7.5 mg Per G Tube Nightly    polyethylene glycol (GLYCOLAX) packet 8.5 g  8.5 g Per G Tube Daily    midodrine (PROAMATINE) tablet 5 mg  5 mg PEG Tube TID WC    acetaminophen (TYLENOL) 160 MG/5ML solution 650 mg  650 mg Per G Tube BID    ferrous Sulfate 300 (60 Fe) MG/5ML solution 300 mg  300 mg Per G Tube Daily    azithromycin (ZITHROMAX) 500 mg in sodium chloride 0.9 % 250 mL IVPB (Odgo7Oub)  500 mg IntraVENous Q24H    pantoprazole (PROTONIX) 40 mg in sodium chloride (PF) 0.9 % 10 mL injection  40 mg IntraVENous Daily    risperiDONE (RisperDAL) 
https://www.fda.gov/media/269151/download  Fact sheet for Healthcare Providers: https://www.fda.fov/media/173072/download                 Other pertinent lab: none    Total time spent with patient: 30 Minutes I personally reviewed chart, notes, data and current medications in the medical record.  I have personally examined and treated the patient at bedside during this period.                  Care Plan discussed with: Patient, Family, Care Manager, Nursing Staff, Consultant/Specialist, and >50% of time spent in counseling and coordination of care    Discussed:  Care Plan and D/C Planning    Prophylaxis:  Lovenox and H2B/PPI    Disposition:  SNF/LTC           ___________________________________________________    Attending Physician: Santino Estevez MD

## 2024-08-12 NOTE — CARE COORDINATION
8/12/24  3:21 PM    CM noted dc order. CM met with patients caregiver with the group home and she discussed patient needing a PT vest per RT who saw him earlier in his hospital stay. Nursing messaged the attending regarding needing a script and CM sent the order to [facility] and provided the script to the caregiver.  The caregiver requested an appointment with [facility] due to retention and requested CM make an appointment. BRITTNY made an appointment with [facility] for September 10, 2024 @ 1:00 PM, [address] [place] [address] with Dr. Muller.     CM called [employer] transportation and set up transportation for a stretcher for the patient which is a 3 hour window from 3:10 PM. Trip number [id number] (phone number [phone] )    No further CM needs.     TERRY Granados  [facility]      Available via [employer]

## 2024-08-13 LAB
BACTERIA SPEC CULT: NORMAL
SERVICE CMNT-IMP: NORMAL

## 2024-09-22 ENCOUNTER — HOSPITAL ENCOUNTER (INPATIENT)
Facility: HOSPITAL | Age: 59
LOS: 14 days | Discharge: NURSING, REHAB OR LONG-TERM CARE | DRG: 720 | End: 2024-10-07
Attending: EMERGENCY MEDICINE | Admitting: HOSPITALIST
Payer: MEDICAID

## 2024-09-22 ENCOUNTER — APPOINTMENT (OUTPATIENT)
Facility: HOSPITAL | Age: 59
DRG: 720 | End: 2024-09-22
Payer: MEDICAID

## 2024-09-22 DIAGNOSIS — F79 INTELLECTUAL DISABILITY: ICD-10-CM

## 2024-09-22 DIAGNOSIS — A41.9 SEPTICEMIA (HCC): Primary | ICD-10-CM

## 2024-09-22 LAB
ALBUMIN SERPL-MCNC: 4.2 G/DL (ref 3.5–5.2)
ALBUMIN/GLOB SERPL: 1.5 (ref 1.1–2.2)
ALP SERPL-CCNC: 117 U/L (ref 40–129)
ALT SERPL-CCNC: 18 U/L (ref 10–50)
ANION GAP SERPL CALC-SCNC: 13 MMOL/L (ref 2–12)
AST SERPL-CCNC: 20 U/L (ref 10–50)
BASOPHILS # BLD: 0 K/UL (ref 0–0.1)
BASOPHILS NFR BLD: 0 % (ref 0–1)
BILIRUB SERPL-MCNC: 0.6 MG/DL (ref 0.2–1)
BUN SERPL-MCNC: 17 MG/DL (ref 6–20)
BUN/CREAT SERPL: 28 (ref 12–20)
CALCIUM SERPL-MCNC: 9.2 MG/DL (ref 8.6–10)
CHLORIDE SERPL-SCNC: 105 MMOL/L (ref 98–107)
CO2 SERPL-SCNC: 24 MMOL/L (ref 22–29)
CREAT SERPL-MCNC: 0.61 MG/DL (ref 0.7–1.2)
DIFFERENTIAL METHOD BLD: ABNORMAL
EOSINOPHIL # BLD: 0 K/UL (ref 0–0.4)
EOSINOPHIL NFR BLD: 0 % (ref 0–7)
ERYTHROCYTE [DISTWIDTH] IN BLOOD BY AUTOMATED COUNT: 13.4 % (ref 11.5–14.5)
FLUAV RNA SPEC QL NAA+PROBE: NOT DETECTED
FLUBV RNA SPEC QL NAA+PROBE: NOT DETECTED
GLOBULIN SER CALC-MCNC: 2.8 G/DL (ref 2–4)
GLUCOSE SERPL-MCNC: 118 MG/DL (ref 65–100)
HCT VFR BLD AUTO: 53.1 % (ref 36.6–50.3)
HGB BLD-MCNC: 17.5 G/DL (ref 12.1–17)
IMM GRANULOCYTES # BLD AUTO: 0.2 K/UL (ref 0–0.04)
IMM GRANULOCYTES NFR BLD AUTO: 1 % (ref 0–0.5)
LYMPHOCYTES # BLD: 0.6 K/UL (ref 0.8–3.5)
LYMPHOCYTES NFR BLD: 3 % (ref 12–49)
MCH RBC QN AUTO: 32.6 PG (ref 26–34)
MCHC RBC AUTO-ENTMCNC: 33 G/DL (ref 30–36.5)
MCV RBC AUTO: 98.9 FL (ref 80–99)
MONOCYTES # BLD: 1.3 K/UL (ref 0–1)
MONOCYTES NFR BLD: 7 % (ref 5–13)
NEUTS SEG # BLD: 16.3 K/UL (ref 1.8–8)
NEUTS SEG NFR BLD: 89 % (ref 32–75)
NRBC # BLD: 0 K/UL (ref 0–0.01)
NRBC BLD-RTO: 0 PER 100 WBC
PLATELET # BLD AUTO: 241 K/UL (ref 150–400)
PMV BLD AUTO: 10.1 FL (ref 8.9–12.9)
POTASSIUM SERPL-SCNC: 4.1 MMOL/L (ref 3.5–5.1)
PROT SERPL-MCNC: 7 G/DL (ref 6.4–8.3)
RBC # BLD AUTO: 5.37 M/UL (ref 4.1–5.7)
RBC MORPH BLD: ABNORMAL
SARS-COV-2 RNA RESP QL NAA+PROBE: NOT DETECTED
SODIUM SERPL-SCNC: 142 MMOL/L (ref 136–145)
SOURCE: NORMAL
WBC # BLD AUTO: 18.4 K/UL (ref 4.1–11.1)
WBC MORPH BLD: ABNORMAL

## 2024-09-22 PROCEDURE — 81001 URINALYSIS AUTO W/SCOPE: CPT

## 2024-09-22 PROCEDURE — 99285 EMERGENCY DEPT VISIT HI MDM: CPT

## 2024-09-22 PROCEDURE — 87636 SARSCOV2 & INF A&B AMP PRB: CPT

## 2024-09-22 PROCEDURE — 87186 SC STD MICRODIL/AGAR DIL: CPT

## 2024-09-22 PROCEDURE — 87086 URINE CULTURE/COLONY COUNT: CPT

## 2024-09-22 PROCEDURE — 36415 COLL VENOUS BLD VENIPUNCTURE: CPT

## 2024-09-22 PROCEDURE — 71046 X-RAY EXAM CHEST 2 VIEWS: CPT

## 2024-09-22 PROCEDURE — 80053 COMPREHEN METABOLIC PANEL: CPT

## 2024-09-22 PROCEDURE — 87088 URINE BACTERIA CULTURE: CPT

## 2024-09-22 PROCEDURE — 85025 COMPLETE CBC W/AUTO DIFF WBC: CPT

## 2024-09-22 ASSESSMENT — PAIN SCALES - WONG BAKER: WONGBAKER_NUMERICALRESPONSE: NO HURT

## 2024-09-22 ASSESSMENT — LIFESTYLE VARIABLES
HOW MANY STANDARD DRINKS CONTAINING ALCOHOL DO YOU HAVE ON A TYPICAL DAY: PATIENT DOES NOT DRINK
HOW OFTEN DO YOU HAVE A DRINK CONTAINING ALCOHOL: NEVER

## 2024-09-22 ASSESSMENT — PAIN - FUNCTIONAL ASSESSMENT: PAIN_FUNCTIONAL_ASSESSMENT: WONG-BAKER FACES

## 2024-09-22 NOTE — ED TRIAGE NOTES
Pt arrived in wheelchair with cc of increased secretions from mouth and cough. Reports pulse was 90 but few minutes later it was 114 after. Denies fevers. Called dispatch health reports they were not able to come out so caregivers brought in for eval. Patient is high risk for aspiration has a G/J tube and they are concerned. Gave guaifenesin and breathing treatment just before coming in.     COVID test done today and negative.      PCP started him on Z-josé miguel Thursday due to cough as prophylactic.

## 2024-09-23 ENCOUNTER — APPOINTMENT (OUTPATIENT)
Facility: HOSPITAL | Age: 59
DRG: 720 | End: 2024-09-23
Payer: MEDICAID

## 2024-09-23 PROBLEM — R53.81 DEBILITY: Status: ACTIVE | Noted: 2024-09-23

## 2024-09-23 PROBLEM — A41.9 SEPSIS DUE TO URINARY TRACT INFECTION (HCC): Status: ACTIVE | Noted: 2024-09-23

## 2024-09-23 PROBLEM — E55.9 VITAMIN D DEFICIENCY: Status: RESOLVED | Noted: 2022-05-06 | Resolved: 2024-09-23

## 2024-09-23 PROBLEM — N39.0 URINARY TRACT INFECTION WITHOUT HEMATURIA: Status: ACTIVE | Noted: 2024-09-23

## 2024-09-23 PROBLEM — N39.0 SEPSIS DUE TO URINARY TRACT INFECTION (HCC): Status: ACTIVE | Noted: 2024-09-23

## 2024-09-23 PROBLEM — Z71.89 DNR (DO NOT RESUSCITATE) DISCUSSION: Status: ACTIVE | Noted: 2024-09-23

## 2024-09-23 PROBLEM — J18.9 PNEUMONIA OF BOTH LOWER LOBES DUE TO INFECTIOUS ORGANISM: Status: RESOLVED | Noted: 2024-08-08 | Resolved: 2024-09-23

## 2024-09-23 PROBLEM — Z51.5 PALLIATIVE CARE ENCOUNTER: Status: ACTIVE | Noted: 2024-09-23

## 2024-09-23 LAB
APPEARANCE UR: ABNORMAL
BACTERIA URNS QL MICRO: NEGATIVE /HPF
BILIRUB UR QL: NEGATIVE
COLOR UR: ABNORMAL
EPITH CASTS URNS QL MICRO: ABNORMAL /LPF
GLUCOSE UR STRIP.AUTO-MCNC: NEGATIVE MG/DL
HGB UR QL STRIP: ABNORMAL
KETONES UR QL STRIP.AUTO: 15 MG/DL
LACTATE SERPL-SCNC: 1.8 MMOL/L (ref 0.4–2)
LEUKOCYTE ESTERASE UR QL STRIP.AUTO: ABNORMAL
NITRITE UR QL STRIP.AUTO: POSITIVE
NT PRO BNP: 954 PG/ML
PH UR STRIP: 8 (ref 5–8)
PROCALCITONIN SERPL-MCNC: 0.25 NG/ML
PROT UR STRIP-MCNC: >300 MG/DL
RBC #/AREA URNS HPF: ABNORMAL /HPF (ref 0–5)
SP GR UR REFRACTOMETRY: 1.02 (ref 1–1.03)
UROBILINOGEN UR QL STRIP.AUTO: 1 EU/DL (ref 0.2–1)
WBC URNS QL MICRO: >100 /HPF (ref 0–4)

## 2024-09-23 PROCEDURE — 84145 PROCALCITONIN (PCT): CPT

## 2024-09-23 PROCEDURE — 6360000002 HC RX W HCPCS: Performed by: HOSPITALIST

## 2024-09-23 PROCEDURE — 6360000004 HC RX CONTRAST MEDICATION: Performed by: NURSE PRACTITIONER

## 2024-09-23 PROCEDURE — 2580000003 HC RX 258: Performed by: HOSPITALIST

## 2024-09-23 PROCEDURE — 6370000000 HC RX 637 (ALT 250 FOR IP): Performed by: HOSPITALIST

## 2024-09-23 PROCEDURE — 83605 ASSAY OF LACTIC ACID: CPT

## 2024-09-23 PROCEDURE — 87040 BLOOD CULTURE FOR BACTERIA: CPT

## 2024-09-23 PROCEDURE — 2060000000 HC ICU INTERMEDIATE R&B

## 2024-09-23 PROCEDURE — 99222 1ST HOSP IP/OBS MODERATE 55: CPT | Performed by: NURSE PRACTITIONER

## 2024-09-23 PROCEDURE — 6370000000 HC RX 637 (ALT 250 FOR IP): Performed by: PHYSICIAN ASSISTANT

## 2024-09-23 PROCEDURE — 74177 CT ABD & PELVIS W/CONTRAST: CPT

## 2024-09-23 PROCEDURE — 2580000003 HC RX 258: Performed by: INTERNAL MEDICINE

## 2024-09-23 PROCEDURE — 83880 ASSAY OF NATRIURETIC PEPTIDE: CPT

## 2024-09-23 PROCEDURE — 3E0G76Z INTRODUCTION OF NUTRITIONAL SUBSTANCE INTO UPPER GI, VIA NATURAL OR ARTIFICIAL OPENING: ICD-10-PCS | Performed by: INTERNAL MEDICINE

## 2024-09-23 PROCEDURE — 36415 COLL VENOUS BLD VENIPUNCTURE: CPT

## 2024-09-23 PROCEDURE — 94761 N-INVAS EAR/PLS OXIMETRY MLT: CPT

## 2024-09-23 RX ORDER — RISPERIDONE 1 MG/ML
2 SOLUTION ORAL 2 TIMES DAILY
Status: DISCONTINUED | OUTPATIENT
Start: 2024-09-23 | End: 2024-10-07 | Stop reason: HOSPADM

## 2024-09-23 RX ORDER — POLYETHYLENE GLYCOL 3350 17 G/17G
17 POWDER, FOR SOLUTION ORAL DAILY PRN
Status: DISCONTINUED | OUTPATIENT
Start: 2024-09-23 | End: 2024-10-07 | Stop reason: HOSPADM

## 2024-09-23 RX ORDER — MIDODRINE HYDROCHLORIDE 5 MG/1
5 TABLET ORAL
Status: DISCONTINUED | OUTPATIENT
Start: 2024-09-23 | End: 2024-10-07 | Stop reason: HOSPADM

## 2024-09-23 RX ORDER — ENOXAPARIN SODIUM 100 MG/ML
30 INJECTION SUBCUTANEOUS DAILY
Status: DISCONTINUED | OUTPATIENT
Start: 2024-09-23 | End: 2024-10-07 | Stop reason: HOSPADM

## 2024-09-23 RX ORDER — SODIUM CHLORIDE 9 MG/ML
INJECTION, SOLUTION INTRAVENOUS PRN
Status: DISCONTINUED | OUTPATIENT
Start: 2024-09-23 | End: 2024-10-07 | Stop reason: HOSPADM

## 2024-09-23 RX ORDER — POTASSIUM CHLORIDE 7.45 MG/ML
10 INJECTION INTRAVENOUS PRN
Status: DISCONTINUED | OUTPATIENT
Start: 2024-09-23 | End: 2024-09-23

## 2024-09-23 RX ORDER — IOPAMIDOL 755 MG/ML
80 INJECTION, SOLUTION INTRAVASCULAR
Status: COMPLETED | OUTPATIENT
Start: 2024-09-23 | End: 2024-09-23

## 2024-09-23 RX ORDER — ACETAMINOPHEN 650 MG/1
650 SUPPOSITORY RECTAL EVERY 6 HOURS PRN
Status: DISCONTINUED | OUTPATIENT
Start: 2024-09-23 | End: 2024-10-07 | Stop reason: HOSPADM

## 2024-09-23 RX ORDER — 0.9 % SODIUM CHLORIDE 0.9 %
500 INTRAVENOUS SOLUTION INTRAVENOUS ONCE
Status: COMPLETED | OUTPATIENT
Start: 2024-09-23 | End: 2024-09-23

## 2024-09-23 RX ORDER — ONDANSETRON 4 MG/1
4 TABLET, ORALLY DISINTEGRATING ORAL EVERY 8 HOURS PRN
Status: DISCONTINUED | OUTPATIENT
Start: 2024-09-23 | End: 2024-09-23

## 2024-09-23 RX ORDER — IPRATROPIUM BROMIDE AND ALBUTEROL SULFATE 2.5; .5 MG/3ML; MG/3ML
1 SOLUTION RESPIRATORY (INHALATION) EVERY 4 HOURS PRN
Status: DISCONTINUED | OUTPATIENT
Start: 2024-09-23 | End: 2024-10-07 | Stop reason: HOSPADM

## 2024-09-23 RX ORDER — CETIRIZINE HYDROCHLORIDE 10 MG/1
5 TABLET ORAL DAILY
Status: DISCONTINUED | OUTPATIENT
Start: 2024-09-23 | End: 2024-10-07 | Stop reason: HOSPADM

## 2024-09-23 RX ORDER — ONDANSETRON 4 MG/1
4 TABLET, ORALLY DISINTEGRATING ORAL EVERY 8 HOURS PRN
Status: DISCONTINUED | OUTPATIENT
Start: 2024-09-23 | End: 2024-10-07 | Stop reason: HOSPADM

## 2024-09-23 RX ORDER — SODIUM CHLORIDE 0.9 % (FLUSH) 0.9 %
5-40 SYRINGE (ML) INJECTION PRN
Status: DISCONTINUED | OUTPATIENT
Start: 2024-09-23 | End: 2024-10-07 | Stop reason: HOSPADM

## 2024-09-23 RX ORDER — ACETAMINOPHEN 650 MG/1
650 SUPPOSITORY RECTAL EVERY 6 HOURS PRN
Status: DISCONTINUED | OUTPATIENT
Start: 2024-09-23 | End: 2024-09-23

## 2024-09-23 RX ORDER — MAGNESIUM SULFATE IN WATER 40 MG/ML
2000 INJECTION, SOLUTION INTRAVENOUS PRN
Status: DISCONTINUED | OUTPATIENT
Start: 2024-09-23 | End: 2024-09-23

## 2024-09-23 RX ORDER — BISACODYL 10 MG
10 SUPPOSITORY, RECTAL RECTAL DAILY PRN
Status: DISCONTINUED | OUTPATIENT
Start: 2024-09-23 | End: 2024-10-07 | Stop reason: HOSPADM

## 2024-09-23 RX ORDER — ACETAMINOPHEN 160 MG/5ML
650 SUSPENSION ORAL
Status: DISCONTINUED | OUTPATIENT
Start: 2024-09-23 | End: 2024-09-23 | Stop reason: CLARIF

## 2024-09-23 RX ORDER — ONDANSETRON 2 MG/ML
4 INJECTION INTRAMUSCULAR; INTRAVENOUS EVERY 6 HOURS PRN
Status: DISCONTINUED | OUTPATIENT
Start: 2024-09-23 | End: 2024-10-07 | Stop reason: HOSPADM

## 2024-09-23 RX ORDER — POLYETHYLENE GLYCOL 3350 17 G/17G
8 POWDER, FOR SOLUTION ORAL DAILY
Status: DISCONTINUED | OUTPATIENT
Start: 2024-09-23 | End: 2024-10-07 | Stop reason: HOSPADM

## 2024-09-23 RX ORDER — ACETAMINOPHEN 160 MG/5ML
650 LIQUID ORAL
Status: COMPLETED | OUTPATIENT
Start: 2024-09-23 | End: 2024-09-23

## 2024-09-23 RX ORDER — FERROUS SULFATE 300 MG/5ML
300 LIQUID (ML) ORAL DAILY
Status: DISCONTINUED | OUTPATIENT
Start: 2024-09-23 | End: 2024-10-07 | Stop reason: HOSPADM

## 2024-09-23 RX ORDER — ACETAMINOPHEN 325 MG/1
650 TABLET ORAL EVERY 6 HOURS PRN
Status: DISCONTINUED | OUTPATIENT
Start: 2024-09-23 | End: 2024-10-07 | Stop reason: HOSPADM

## 2024-09-23 RX ORDER — ONDANSETRON 2 MG/ML
4 INJECTION INTRAMUSCULAR; INTRAVENOUS EVERY 6 HOURS PRN
Status: DISCONTINUED | OUTPATIENT
Start: 2024-09-23 | End: 2024-09-23

## 2024-09-23 RX ORDER — ACETAMINOPHEN 325 MG/1
650 TABLET ORAL EVERY 6 HOURS PRN
Status: DISCONTINUED | OUTPATIENT
Start: 2024-09-23 | End: 2024-09-23

## 2024-09-23 RX ORDER — FINASTERIDE 5 MG/1
5 TABLET, FILM COATED ORAL DAILY
Status: ON HOLD | COMMUNITY
End: 2024-10-07

## 2024-09-23 RX ORDER — SODIUM CHLORIDE 0.9 % (FLUSH) 0.9 %
5-40 SYRINGE (ML) INJECTION EVERY 12 HOURS SCHEDULED
Status: DISCONTINUED | OUTPATIENT
Start: 2024-09-23 | End: 2024-10-07 | Stop reason: HOSPADM

## 2024-09-23 RX ORDER — GUAIFENESIN 200 MG/10ML
200 LIQUID ORAL 4 TIMES DAILY PRN
Status: DISCONTINUED | OUTPATIENT
Start: 2024-09-23 | End: 2024-10-07 | Stop reason: HOSPADM

## 2024-09-23 RX ORDER — GLYCOPYRROLATE 1 MG/1
1 TABLET ORAL DAILY
Status: DISCONTINUED | OUTPATIENT
Start: 2024-09-23 | End: 2024-10-07 | Stop reason: HOSPADM

## 2024-09-23 RX ORDER — MIRTAZAPINE 15 MG/1
7.5 TABLET, FILM COATED ORAL NIGHTLY
Status: DISCONTINUED | OUTPATIENT
Start: 2024-09-23 | End: 2024-10-07 | Stop reason: HOSPADM

## 2024-09-23 RX ORDER — SODIUM CHLORIDE, SODIUM LACTATE, POTASSIUM CHLORIDE, CALCIUM CHLORIDE 600; 310; 30; 20 MG/100ML; MG/100ML; MG/100ML; MG/100ML
INJECTION, SOLUTION INTRAVENOUS CONTINUOUS
Status: DISCONTINUED | OUTPATIENT
Start: 2024-09-23 | End: 2024-09-24

## 2024-09-23 RX ORDER — BACLOFEN 10 MG/1
10 TABLET ORAL 3 TIMES DAILY
Status: DISCONTINUED | OUTPATIENT
Start: 2024-09-23 | End: 2024-10-07 | Stop reason: HOSPADM

## 2024-09-23 RX ORDER — POTASSIUM CHLORIDE 750 MG/1
40 TABLET, EXTENDED RELEASE ORAL PRN
Status: DISCONTINUED | OUTPATIENT
Start: 2024-09-23 | End: 2024-09-23

## 2024-09-23 RX ADMIN — GLYCOPYRROLATE 1 MG: 1 TABLET ORAL at 10:35

## 2024-09-23 RX ADMIN — SODIUM CHLORIDE 500 ML: 9 INJECTION, SOLUTION INTRAVENOUS at 06:17

## 2024-09-23 RX ADMIN — RISPERIDONE 2 MG: 1 SOLUTION ORAL at 10:36

## 2024-09-23 RX ADMIN — PIPERACILLIN AND TAZOBACTAM 3375 MG: 3; .375 INJECTION, POWDER, FOR SOLUTION INTRAVENOUS at 02:19

## 2024-09-23 RX ADMIN — BACLOFEN 10 MG: 10 TABLET ORAL at 14:14

## 2024-09-23 RX ADMIN — ACETAMINOPHEN 650 MG: 650 SOLUTION ORAL at 02:21

## 2024-09-23 RX ADMIN — BACLOFEN 10 MG: 10 TABLET ORAL at 21:13

## 2024-09-23 RX ADMIN — BACLOFEN 10 MG: 10 TABLET ORAL at 10:33

## 2024-09-23 RX ADMIN — MIRTAZAPINE 7.5 MG: 15 TABLET, FILM COATED ORAL at 21:13

## 2024-09-23 RX ADMIN — POLYETHYLENE GLYCOL 3350 8.5 G: 17 POWDER, FOR SOLUTION ORAL at 10:36

## 2024-09-23 RX ADMIN — PIPERACILLIN AND TAZOBACTAM 3375 MG: 3; .375 INJECTION, POWDER, LYOPHILIZED, FOR SOLUTION INTRAVENOUS at 10:53

## 2024-09-23 RX ADMIN — PANTOPRAZOLE SODIUM 40 MG: 40 INJECTION, POWDER, FOR SOLUTION INTRAVENOUS at 10:33

## 2024-09-23 RX ADMIN — MIDODRINE HYDROCHLORIDE 5 MG: 5 TABLET ORAL at 16:44

## 2024-09-23 RX ADMIN — SODIUM CHLORIDE, PRESERVATIVE FREE 10 ML: 5 INJECTION INTRAVENOUS at 10:36

## 2024-09-23 RX ADMIN — PIPERACILLIN AND TAZOBACTAM 4500 MG: 4; .5 INJECTION, POWDER, FOR SOLUTION INTRAVENOUS at 03:18

## 2024-09-23 RX ADMIN — ENOXAPARIN SODIUM 30 MG: 100 INJECTION SUBCUTANEOUS at 10:34

## 2024-09-23 RX ADMIN — CETIRIZINE HYDROCHLORIDE 5 MG: 10 TABLET, FILM COATED ORAL at 10:32

## 2024-09-23 RX ADMIN — RISPERIDONE 2 MG: 1 SOLUTION ORAL at 21:13

## 2024-09-23 RX ADMIN — SODIUM CHLORIDE: 9 INJECTION, SOLUTION INTRAVENOUS at 10:50

## 2024-09-23 RX ADMIN — SODIUM CHLORIDE, POTASSIUM CHLORIDE, SODIUM LACTATE AND CALCIUM CHLORIDE: 600; 310; 30; 20 INJECTION, SOLUTION INTRAVENOUS at 14:09

## 2024-09-23 RX ADMIN — MIDODRINE HYDROCHLORIDE 5 MG: 5 TABLET ORAL at 14:12

## 2024-09-23 RX ADMIN — PIPERACILLIN AND TAZOBACTAM 3375 MG: 3; .375 INJECTION, POWDER, LYOPHILIZED, FOR SOLUTION INTRAVENOUS at 19:40

## 2024-09-23 RX ADMIN — SODIUM CHLORIDE, PRESERVATIVE FREE 10 ML: 5 INJECTION INTRAVENOUS at 21:13

## 2024-09-23 RX ADMIN — IOPAMIDOL 80 ML: 755 INJECTION, SOLUTION INTRAVENOUS at 09:02

## 2024-09-23 ASSESSMENT — PAIN SCALES - WONG BAKER
WONGBAKER_NUMERICALRESPONSE: NO HURT

## 2024-09-23 NOTE — ED NOTES
TRANSFER - OUT REPORT:    Verbal report given to BOBBY Ortega on Ayden Simpson  being transferred to [facility] ED for routine progression of patient care       Report consisted of patient's Situation, Background, Assessment and   Recommendations(SBAR).     Information from the following report(s) Nurse Handoff Report, ED Encounter Summary, ED SBAR, MAR, Recent Results, Neuro Assessment, and Event Log was reviewed with the receiving nurse.    [place] Fall Assessment:    Presents to emergency department  because of falls (Syncope, seizure, or loss of consciousness): No  Age > 70: No  Altered Mental Status, Intoxication with alcohol or substance confusion (Disorientation, impaired judgment, poor safety awaremess, or inability to follow instructions): Yes  Impaired Mobility: Ambulates or transfers with assistive devices or assistance; Unable to ambulate or transer.: Yes  Nursing Judgement: No          Lines:   Peripheral IV 09/23/24 Right Foot (Active)   Site Assessment Clean, dry & intact 09/23/24 0216   Line Status Blood return noted 09/23/24 0216   Phlebitis Assessment No symptoms 09/23/24 0216   Infiltration Assessment 0 09/23/24 0216   Dressing Status New dressing applied 09/23/24 0216   Dressing Type Transparent 09/23/24 0216   Dressing Intervention New 09/23/24 0216        Opportunity for questions and clarification was provided.      Patient transported with:  Monitor and Patient-specific medications from Pharmacy

## 2024-09-23 NOTE — ED NOTES
This nurse along with charge nurse have been collaborating for the last hour on gaining IV access. After several attempts IV access is established in the R ankle/foot. Provider is aware and has no contest at this time. Lactic on ice sent to lab.

## 2024-09-23 NOTE — ED PROVIDER NOTES
[facility] EMERGENCY DEPT  EMERGENCY DEPARTMENT ENCOUNTER      Pt Name: Ayedn Simpson  MRN: [id number]  Birthdate 1965  Date of evaluation: 9/22/2024  Provider: Ana Aguilera PA-C    CHIEF COMPLAINT       Chief Complaint   Patient presents with    Cough         HISTORY OF PRESENT ILLNESS   (Location/Symptom, Timing/Onset, Context/Setting, Quality, Duration, Modifying Factors, Severity)  Note limiting factors.   The patient is a 48-year-old male who has cerebral palsy and chronic encephalopathy.  He is a resident of a group home, and has private duty nursing.  The patient also has gastroesophageal reflux, anxiety, and multiple contractures.  He is nonambulatory.  He has a percutaneous tube for feeding, and receives medications and water flushes via tube.    He is here today, because he was having some increased congestion, and secretions and was seen by his primary care provider in the group home on Thursday, 4 days ago.  At that time, he was started on azithromycin empirically, without fully identified source of infection.  He has persisted with increasing secretions, and the nursing staff at his group home was concerned regarding possible aspiration, as he is high risk for this, and has recently had pneumonia and required hospitalization for that.  Additionally, the patient was tremoring, as if he was chilling, but had not had a documented fever in the group home, although upon arrival to the ED emergency department, the patient had a 99.2 temperature.          Review of External Medical Records:     Nursing Notes were reviewed.    REVIEW OF SYSTEMS    (2-9 systems for level 4, 10 or more for level 5)     Review of Systems    Except as noted above the remainder of the review of systems was reviewed and negative.       PAST MEDICAL HISTORY     Past Medical History:   Diagnosis Date    Anxiety     Cerebral palsy (HCC)     Contracted, joint, multiple sites     upper extremities due to cerebral palsy

## 2024-09-23 NOTE — H&P
route daily  24  Automatic Reconciliation, Ar       REVIEW OF SYSTEMS:  See HPI for details   POSITIVE= Bold.  Negative = normal text  Unable to obtain due to mental status       Objective:   VITALS:    Vitals:    24 0000   BP: 109/76   Pulse:    Resp:    Temp:    SpO2: 93%     Patient Vitals for the past 24 hrs:   BP Temp Temp src Pulse Resp SpO2 Height Weight   24 0000 109/76 -- -- -- -- 93 % -- --   24 2315 111/75 -- -- -- -- 93 % -- --   24 2300 -- (!) 101.4 °F (38.6 °C) -- -- -- 94 % -- --   24 2215 -- -- -- -- -- 92 % -- --   24 1906 103/68 99.8 °F (37.7 °C) Axillary (!) 128 18 96 % 1.448 m (4' 9\") 42.6 kg (94 lb)       Temp (24hrs), Av.6 °F (38.1 °C), Min:99.8 °F (37.7 °C), Max:101.4 °F (38.6 °C)           Wt Readings from Last 12 Encounters:   24 42.6 kg (94 lb)   24 42.6 kg (94 lb)   24 40.4 kg (89 lb)   24 45.5 kg (100 lb 5 oz)   23 41.8 kg (92 lb 3.2 oz)   22 36.8 kg (81 lb 3.2 oz)   22 35.4 kg (78 lb)     Body mass index is 20.34 kg/m².    PHYSICAL EXAM:    Gen: Petite chronically ill appearing male, laying flat in bed, eyes semi open.  No respiratory distress  HEENT:  Pink conjunctivae, PERRL, hearing intact to voice, dry mucous membranes, white dried crust on inside of lower lip.  Neck: Supple, without masses, thyroid non-tender  Resp: Poor inspiratory effort, No accessory muscle use, without wheezes rales or rhonchi.  Right gynecomastia  Card: Tachycardic, No murmurs, normal S1, S2 without thrills, bruits or peripheral edema  Abd:  Soft, +G-J tube in place without discharge, non-tender, non-distended, normoactive bowel sounds are present, no palpable organomegaly and no detectable hernias  Lymph:  No cervical or inguinal adenopathy  Musc: Bilateral arms severe contracture, No cyanosis or clubbing  Skin: No rashes or ulcers, skin turgor is good  Neuro:  nonverbal, no spontaneous movement, no abnormal twitching, no

## 2024-09-23 NOTE — ED NOTES
Straight cath completed with Rossana student nurse, as second. Coude 16 Fr catheter used for straight cath. Pt's caregivers state that pt had a wet brief prior to straight cath. Pt had 6 mL with straight cath of oswaldo, cloudy urine. Post cath residual, 0mL. Provider notified.

## 2024-09-23 NOTE — CARE COORDINATION
09/23/24  10:40 AM    Case Management Assessment  Initial Evaluation    Date/Time of Evaluation: 9/23/2024 10:40 AM  Assessment Completed by: Brie Vuong    If patient is discharged prior to next notation, then this note serves as note for discharge by case management.    Patient Name: Ayden Simpson                   YOB: 1965  Diagnosis: Sepsis due to urinary tract infection (HCC) [A41.9, N39.0]                   Date / Time: 9/22/2024  7:04 PM    Patient Admission Status: Inpatient   Readmission Risk (Low < 19, Mod (19-27), High > 27): Readmission Risk Score: 22.4    Current PCP: Alisson Mandel APRN - CNP  PCP verified by CM? (P) Yes (Alisson Mandel)    Chart Reviewed: Yes      History Provided by: (P) Medical Record, Other (see comment) (Primary Caregiver)  Patient Orientation: (P) Unable to Assess (Severe ID)    Patient Cognition:      Hospitalization in the last 30 days (Readmission):  No    If yes, Readmission Assessment in CM Navigator will be completed.    Advance Directives:      Code Status: Full Code   Patient's Primary Decision Maker is: (P) Named in Scanned ACP Document    Primary Decision Maker: Alejandra Hicks - Legal Guardian, Legal Guardian - [phone]    Discharge Planning:    Patient lives with: (P) Other (Comment) (group home staff) Type of Home: (P) Group Home  Primary Care Giver: (P) Private caregiver  Patient Support Systems include: (P) Family Members, /, Home Care Staff   Current Financial resources: (P) Medicaid  Current community resources: (P) Housing  Current services prior to admission: (P) Durable Medical Equipment, Home Care, Home Infusion, Transportation            Current DME: (P) Hospital Bed, Enteral Feedings, Oxygen Therapy (Comment) (2 L O2 PRN)            Type of Home Care services:  (P) Aide Services, Attendant    ADLS  Prior functional level: (P) Assistance with the following:, Bathing, Dressing, Toileting, Feeding, Cooking,

## 2024-09-23 NOTE — ED NOTES
This nurse attempted PIV placement x1 on pt. Placement had blood return that did not fill up tube, puncture vein blew while attempting to obtain specimen.

## 2024-09-23 NOTE — ED NOTES
After establishing IV access and checking MAR in preparation to give ABT this nurse notices initial Rocephin push has been DC'd due to admin time being outside of window. This nurse also learns that pt never received his Tylenol suspension from previous shift and will administer at this time. This nurse notes an admitting order for zosyn and will begin this ABT at this time.

## 2024-09-23 NOTE — ED NOTES
Bedside and Verbal shift change report given to Merle (oncoming nurse) by Karime (offgoing nurse).

## 2024-09-23 NOTE — ED NOTES
This nurse along with charge nurse have been collaborating for the last hour on gaining IV access. After several attempts IV access is established in the R ankle/foot. Provider is aware and has no contest at this time until consult for more order. Lactic on ice sent to lab.

## 2024-09-23 NOTE — ED NOTES
Bedside and Verbal shift change report given to Sammie (oncoming nurse) by Agnes (offgoing nurse). Report included the following information ED Encounter Summary, ED SBAR, MAR, and Recent Results.

## 2024-09-23 NOTE — ED NOTES
Bedside shift change report received from BOBBY Schaffer (off-going nurse) given to BOBBY Bloom (on-coming nurse). Report included the following information Nurse Handoff Report, ED Encounter Summary, and ED SBAR. Off-going RN also informs this nurse that IV access could not be established and thus blood cultures, lactic acid, procalcitonin, and Rocephin push are delayed. This nurse is now headed to pt's room to establish IV access and draw labs.   With chronic abdominal pain - followed by GI, suspect d/t underlying diseases - c/w Bentyl, PPI, bowel regimen (s/p bowel prep 11/12, scopes 11/13 - EGD gastritis, colon nodular mucosa in cecum, path pending)  gastritis - on PPI, sucralfate  - appreciate PC input - seems some improvement on Reglan PO with Zofran PRN, no longer complaining of nausea at this time, would prefer liquid diet  - changed to full liquid per pt's request.  - Dr. Gore spoke w pt's daughter in detail, monitoring over weekend, see if pt can tolerate any solids such as toast or crackers.  Encouraged family to bring anything pt desires. She notes, this may be as good as we can get her, her sister notes she has had trouble with nausea and vomiting in past and used to only be able to eat only a few times per week.  Pt does have h/o poorly controlled DM, likely underlying gastroparesis as well.

## 2024-09-23 NOTE — ED NOTES
This nurse spoke with Alejandra Hicks, pt's legal guardian, who wanted to update this nurse and medical team on pt. Pt's legal guardian is concerned about pt's heart rate and oxygenation status. MD notified. Pt's legal guardian updated.

## 2024-09-23 NOTE — ED NOTES
This nurse has made multiple attempts to gain IV access without success. This nurse has successfully gotten blood cultures and a procalcitonin but again IV access has not been established at this time in order for pt to receive ABT. Lactic still pending.

## 2024-09-23 NOTE — ED NOTES
This nurse perfect served attending provider to ask about potentially speeding up pt's Zosyn infusion since he never received his Rocephin push during previous shift. Attending advises this nurse to consult pharmacy. This nurse then contacted pharmacy who endorses they were under the impression that pt received the initial Rocephin push and therefore the current Zosyn order is a maintenance dose. Pharmacist agrees that because this is the 1st dose of ABT pt is receiving the order should be updated which is in process.

## 2024-09-24 LAB
ALBUMIN SERPL-MCNC: 2.4 G/DL (ref 3.5–5)
ALBUMIN/GLOB SERPL: 0.9 (ref 1.1–2.2)
ALP SERPL-CCNC: 92 U/L (ref 45–117)
ALT SERPL-CCNC: 22 U/L (ref 12–78)
ANION GAP SERPL CALC-SCNC: 7 MMOL/L (ref 2–12)
AST SERPL-CCNC: 37 U/L (ref 15–37)
BILIRUB SERPL-MCNC: 1 MG/DL (ref 0.2–1)
BUN SERPL-MCNC: 10 MG/DL (ref 6–20)
BUN/CREAT SERPL: 17 (ref 12–20)
CALCIUM SERPL-MCNC: 8.3 MG/DL (ref 8.5–10.1)
CHLORIDE SERPL-SCNC: 110 MMOL/L (ref 97–108)
CK SERPL-CCNC: 87 U/L (ref 39–308)
CO2 SERPL-SCNC: 25 MMOL/L (ref 21–32)
CREAT SERPL-MCNC: 0.6 MG/DL (ref 0.7–1.3)
CRP SERPL-MCNC: 18.1 MG/DL (ref 0–0.3)
ERYTHROCYTE [DISTWIDTH] IN BLOOD BY AUTOMATED COUNT: 13.9 % (ref 11.5–14.5)
GLOBULIN SER CALC-MCNC: 2.8 G/DL (ref 2–4)
GLUCOSE SERPL-MCNC: 106 MG/DL (ref 65–100)
HCT VFR BLD AUTO: 42.9 % (ref 36.6–50.3)
HGB BLD-MCNC: 14.1 G/DL (ref 12.1–17)
MAGNESIUM SERPL-MCNC: 2 MG/DL (ref 1.6–2.4)
MCH RBC QN AUTO: 33.3 PG (ref 26–34)
MCHC RBC AUTO-ENTMCNC: 32.9 G/DL (ref 30–36.5)
MCV RBC AUTO: 101.4 FL (ref 80–99)
NRBC # BLD: 0 K/UL (ref 0–0.01)
NRBC BLD-RTO: 0 PER 100 WBC
PHOSPHATE SERPL-MCNC: 3.2 MG/DL (ref 2.6–4.7)
PLATELET # BLD AUTO: 212 K/UL (ref 150–400)
PMV BLD AUTO: 9.9 FL (ref 8.9–12.9)
POTASSIUM SERPL-SCNC: 4.4 MMOL/L (ref 3.5–5.1)
PROCALCITONIN SERPL-MCNC: 0.63 NG/ML
PROT SERPL-MCNC: 5.2 G/DL (ref 6.4–8.2)
RBC # BLD AUTO: 4.23 M/UL (ref 4.1–5.7)
SODIUM SERPL-SCNC: 142 MMOL/L (ref 136–145)
WBC # BLD AUTO: 21.7 K/UL (ref 4.1–11.1)

## 2024-09-24 PROCEDURE — 2580000003 HC RX 258: Performed by: HOSPITALIST

## 2024-09-24 PROCEDURE — 80053 COMPREHEN METABOLIC PANEL: CPT

## 2024-09-24 PROCEDURE — 6360000002 HC RX W HCPCS: Performed by: INTERNAL MEDICINE

## 2024-09-24 PROCEDURE — 6360000002 HC RX W HCPCS: Performed by: HOSPITALIST

## 2024-09-24 PROCEDURE — 36415 COLL VENOUS BLD VENIPUNCTURE: CPT

## 2024-09-24 PROCEDURE — 6370000000 HC RX 637 (ALT 250 FOR IP): Performed by: HOSPITALIST

## 2024-09-24 PROCEDURE — 2580000003 HC RX 258: Performed by: INTERNAL MEDICINE

## 2024-09-24 PROCEDURE — 84100 ASSAY OF PHOSPHORUS: CPT

## 2024-09-24 PROCEDURE — 82550 ASSAY OF CK (CPK): CPT

## 2024-09-24 PROCEDURE — 85027 COMPLETE CBC AUTOMATED: CPT

## 2024-09-24 PROCEDURE — 84145 PROCALCITONIN (PCT): CPT

## 2024-09-24 PROCEDURE — 83735 ASSAY OF MAGNESIUM: CPT

## 2024-09-24 PROCEDURE — 2060000000 HC ICU INTERMEDIATE R&B

## 2024-09-24 PROCEDURE — 94761 N-INVAS EAR/PLS OXIMETRY MLT: CPT

## 2024-09-24 PROCEDURE — 86140 C-REACTIVE PROTEIN: CPT

## 2024-09-24 RX ORDER — BUMETANIDE 0.25 MG/ML
1 INJECTION INTRAMUSCULAR; INTRAVENOUS ONCE
Status: COMPLETED | OUTPATIENT
Start: 2024-09-24 | End: 2024-09-24

## 2024-09-24 RX ADMIN — SODIUM CHLORIDE, POTASSIUM CHLORIDE, SODIUM LACTATE AND CALCIUM CHLORIDE: 600; 310; 30; 20 INJECTION, SOLUTION INTRAVENOUS at 00:35

## 2024-09-24 RX ADMIN — GLYCOPYRROLATE 1 MG: 1 TABLET ORAL at 08:26

## 2024-09-24 RX ADMIN — MIRTAZAPINE 7.5 MG: 15 TABLET, FILM COATED ORAL at 22:02

## 2024-09-24 RX ADMIN — MIDODRINE HYDROCHLORIDE 5 MG: 5 TABLET ORAL at 17:25

## 2024-09-24 RX ADMIN — RISPERIDONE 2 MG: 1 SOLUTION ORAL at 22:02

## 2024-09-24 RX ADMIN — PIPERACILLIN AND TAZOBACTAM 3375 MG: 3; .375 INJECTION, POWDER, LYOPHILIZED, FOR SOLUTION INTRAVENOUS at 02:46

## 2024-09-24 RX ADMIN — PANTOPRAZOLE SODIUM 40 MG: 40 INJECTION, POWDER, FOR SOLUTION INTRAVENOUS at 08:15

## 2024-09-24 RX ADMIN — SODIUM CHLORIDE, PRESERVATIVE FREE 10 ML: 5 INJECTION INTRAVENOUS at 22:04

## 2024-09-24 RX ADMIN — SODIUM CHLORIDE, PRESERVATIVE FREE 10 ML: 5 INJECTION INTRAVENOUS at 08:18

## 2024-09-24 RX ADMIN — MIDODRINE HYDROCHLORIDE 5 MG: 5 TABLET ORAL at 08:16

## 2024-09-24 RX ADMIN — MIDODRINE HYDROCHLORIDE 5 MG: 5 TABLET ORAL at 13:15

## 2024-09-24 RX ADMIN — MEROPENEM 1000 MG: 1 INJECTION INTRAVENOUS at 17:20

## 2024-09-24 RX ADMIN — RISPERIDONE 2 MG: 1 SOLUTION ORAL at 08:17

## 2024-09-24 RX ADMIN — BACLOFEN 10 MG: 10 TABLET ORAL at 22:02

## 2024-09-24 RX ADMIN — CETIRIZINE HYDROCHLORIDE 5 MG: 10 TABLET, FILM COATED ORAL at 08:17

## 2024-09-24 RX ADMIN — ENOXAPARIN SODIUM 30 MG: 100 INJECTION SUBCUTANEOUS at 08:16

## 2024-09-24 RX ADMIN — BACLOFEN 10 MG: 10 TABLET ORAL at 13:15

## 2024-09-24 RX ADMIN — SODIUM CHLORIDE 1000 MG: 9 INJECTION INTRAMUSCULAR; INTRAVENOUS; SUBCUTANEOUS at 08:15

## 2024-09-24 RX ADMIN — BACLOFEN 10 MG: 10 TABLET ORAL at 08:16

## 2024-09-24 RX ADMIN — BUMETANIDE 1 MG: 0.25 INJECTION INTRAMUSCULAR; INTRAVENOUS at 08:16

## 2024-09-24 ASSESSMENT — PAIN SCALES - WONG BAKER: WONGBAKER_NUMERICALRESPONSE: NO HURT

## 2024-09-24 NOTE — CARE COORDINATION
9/24/2024  1:28 PM  Pt is a 59 yo male w/ history of CP, ID at baseline, pt has PEG  emergently admitted 9/23/24 for UTI,/ prostatitis, sepsis, aspiration PNA, discussed in IDR is continuing to require medical management.  Transitions of Care Plan:  RUR 25 % High Risk of Readmission/Red  LOS 1 Day  Medical management continues  Urology following  Palliative following   Nutrition following  CM to follow through for treatment/response  DC when stable to [facility]   Outpatient follow up PCP, specialists   Pt will need S transport through [employer]   CM will follow and assist  EDWIN Galindo

## 2024-09-24 NOTE — ACP (ADVANCE CARE PLANNING)
Primary Decision Maker: Alejandra Hicks - Legal Guardian, Legal Guardian - [phone]     Pt has documentation on file stating that [facility] Public Guardianship program was appointed by the court as legal guardian for pt on 5/7/2008.  Alejandra Hicks serves as pt's assigned guardian through [facility].   no abrasions, no jaundice, no lesions, no pruritis, and no rashes.

## 2024-09-25 LAB
ANION GAP SERPL CALC-SCNC: 5 MMOL/L (ref 2–12)
ANION GAP SERPL CALC-SCNC: 6 MMOL/L (ref 2–12)
BASOPHILS # BLD: 0.1 K/UL (ref 0–0.1)
BASOPHILS NFR BLD: 0 % (ref 0–1)
BUN SERPL-MCNC: 14 MG/DL (ref 6–20)
BUN SERPL-MCNC: 15 MG/DL (ref 6–20)
BUN/CREAT SERPL: 27 (ref 12–20)
BUN/CREAT SERPL: 27 (ref 12–20)
CALCIUM SERPL-MCNC: 8.3 MG/DL (ref 8.5–10.1)
CALCIUM SERPL-MCNC: 8.7 MG/DL (ref 8.5–10.1)
CHLORIDE SERPL-SCNC: 105 MMOL/L (ref 97–108)
CHLORIDE SERPL-SCNC: 106 MMOL/L (ref 97–108)
CO2 SERPL-SCNC: 26 MMOL/L (ref 21–32)
CO2 SERPL-SCNC: 29 MMOL/L (ref 21–32)
CREAT SERPL-MCNC: 0.52 MG/DL (ref 0.7–1.3)
CREAT SERPL-MCNC: 0.55 MG/DL (ref 0.7–1.3)
CRP SERPL-MCNC: 14.3 MG/DL (ref 0–0.3)
CRP SERPL-MCNC: 14.7 MG/DL (ref 0–0.3)
DIFFERENTIAL METHOD BLD: ABNORMAL
EOSINOPHIL # BLD: 0.1 K/UL (ref 0–0.4)
EOSINOPHIL NFR BLD: 1 % (ref 0–7)
ERYTHROCYTE [DISTWIDTH] IN BLOOD BY AUTOMATED COUNT: 13.7 % (ref 11.5–14.5)
GLUCOSE SERPL-MCNC: 87 MG/DL (ref 65–100)
GLUCOSE SERPL-MCNC: 90 MG/DL (ref 65–100)
HCT VFR BLD AUTO: 41.8 % (ref 36.6–50.3)
HGB BLD-MCNC: 14 G/DL (ref 12.1–17)
IMM GRANULOCYTES # BLD AUTO: 0.1 K/UL (ref 0–0.04)
IMM GRANULOCYTES NFR BLD AUTO: 0 % (ref 0–0.5)
LYMPHOCYTES # BLD: 1.1 K/UL (ref 0.8–3.5)
LYMPHOCYTES NFR BLD: 9 % (ref 12–49)
MAGNESIUM SERPL-MCNC: 2 MG/DL (ref 1.6–2.4)
MAGNESIUM SERPL-MCNC: NORMAL MG/DL (ref 1.6–2.4)
MCH RBC QN AUTO: 33.2 PG (ref 26–34)
MCHC RBC AUTO-ENTMCNC: 33.5 G/DL (ref 30–36.5)
MCV RBC AUTO: 99.1 FL (ref 80–99)
MONOCYTES # BLD: 0.9 K/UL (ref 0–1)
MONOCYTES NFR BLD: 8 % (ref 5–13)
NEUTS SEG # BLD: 9.8 K/UL (ref 1.8–8)
NEUTS SEG NFR BLD: 81 % (ref 32–75)
NRBC # BLD: 0 K/UL (ref 0–0.01)
NRBC BLD-RTO: 0 PER 100 WBC
NT PRO BNP: 183 PG/ML
NT PRO BNP: 194 PG/ML
PHOSPHATE SERPL-MCNC: 3.2 MG/DL (ref 2.6–4.7)
PHOSPHATE SERPL-MCNC: 3.4 MG/DL (ref 2.6–4.7)
PLATELET # BLD AUTO: 203 K/UL (ref 150–400)
PMV BLD AUTO: 10.2 FL (ref 8.9–12.9)
POTASSIUM SERPL-SCNC: 3.5 MMOL/L (ref 3.5–5.1)
POTASSIUM SERPL-SCNC: ABNORMAL MMOL/L (ref 3.5–5.1)
RBC # BLD AUTO: 4.22 M/UL (ref 4.1–5.7)
SODIUM SERPL-SCNC: 138 MMOL/L (ref 136–145)
SODIUM SERPL-SCNC: 139 MMOL/L (ref 136–145)
WBC # BLD AUTO: 12 K/UL (ref 4.1–11.1)

## 2024-09-25 PROCEDURE — 85025 COMPLETE CBC W/AUTO DIFF WBC: CPT

## 2024-09-25 PROCEDURE — 86140 C-REACTIVE PROTEIN: CPT

## 2024-09-25 PROCEDURE — 51798 US URINE CAPACITY MEASURE: CPT

## 2024-09-25 PROCEDURE — 2580000003 HC RX 258: Performed by: INTERNAL MEDICINE

## 2024-09-25 PROCEDURE — 6360000002 HC RX W HCPCS: Performed by: HOSPITALIST

## 2024-09-25 PROCEDURE — 6370000000 HC RX 637 (ALT 250 FOR IP): Performed by: HOSPITALIST

## 2024-09-25 PROCEDURE — 99232 SBSQ HOSP IP/OBS MODERATE 35: CPT | Performed by: NURSE PRACTITIONER

## 2024-09-25 PROCEDURE — 99497 ADVNCD CARE PLAN 30 MIN: CPT | Performed by: NURSE PRACTITIONER

## 2024-09-25 PROCEDURE — 83880 ASSAY OF NATRIURETIC PEPTIDE: CPT

## 2024-09-25 PROCEDURE — 84100 ASSAY OF PHOSPHORUS: CPT

## 2024-09-25 PROCEDURE — 2580000003 HC RX 258: Performed by: HOSPITALIST

## 2024-09-25 PROCEDURE — 83735 ASSAY OF MAGNESIUM: CPT

## 2024-09-25 PROCEDURE — 36415 COLL VENOUS BLD VENIPUNCTURE: CPT

## 2024-09-25 PROCEDURE — 1100000000 HC RM PRIVATE

## 2024-09-25 PROCEDURE — 80048 BASIC METABOLIC PNL TOTAL CA: CPT

## 2024-09-25 PROCEDURE — 94761 N-INVAS EAR/PLS OXIMETRY MLT: CPT

## 2024-09-25 PROCEDURE — 6360000002 HC RX W HCPCS: Performed by: INTERNAL MEDICINE

## 2024-09-25 RX ADMIN — BACLOFEN 10 MG: 10 TABLET ORAL at 13:32

## 2024-09-25 RX ADMIN — BACLOFEN 10 MG: 10 TABLET ORAL at 21:35

## 2024-09-25 RX ADMIN — RISPERIDONE 2 MG: 1 SOLUTION ORAL at 09:45

## 2024-09-25 RX ADMIN — POLYETHYLENE GLYCOL 3350 8.5 G: 17 POWDER, FOR SOLUTION ORAL at 09:45

## 2024-09-25 RX ADMIN — RISPERIDONE 2 MG: 1 SOLUTION ORAL at 21:35

## 2024-09-25 RX ADMIN — MEROPENEM 1000 MG: 1 INJECTION INTRAVENOUS at 02:19

## 2024-09-25 RX ADMIN — GLYCOPYRROLATE 1 MG: 1 TABLET ORAL at 09:45

## 2024-09-25 RX ADMIN — PANTOPRAZOLE SODIUM 40 MG: 40 INJECTION, POWDER, FOR SOLUTION INTRAVENOUS at 09:17

## 2024-09-25 RX ADMIN — MEROPENEM 1000 MG: 1 INJECTION INTRAVENOUS at 17:33

## 2024-09-25 RX ADMIN — SODIUM CHLORIDE, PRESERVATIVE FREE 10 ML: 5 INJECTION INTRAVENOUS at 09:20

## 2024-09-25 RX ADMIN — MIRTAZAPINE 7.5 MG: 15 TABLET, FILM COATED ORAL at 21:35

## 2024-09-25 RX ADMIN — MIDODRINE HYDROCHLORIDE 5 MG: 5 TABLET ORAL at 17:25

## 2024-09-25 RX ADMIN — MIDODRINE HYDROCHLORIDE 5 MG: 5 TABLET ORAL at 13:32

## 2024-09-25 RX ADMIN — SODIUM CHLORIDE, PRESERVATIVE FREE 10 ML: 5 INJECTION INTRAVENOUS at 21:36

## 2024-09-25 RX ADMIN — MIDODRINE HYDROCHLORIDE 5 MG: 5 TABLET ORAL at 09:44

## 2024-09-25 RX ADMIN — ENOXAPARIN SODIUM 30 MG: 100 INJECTION SUBCUTANEOUS at 09:17

## 2024-09-25 RX ADMIN — MEROPENEM 1000 MG: 1 INJECTION INTRAVENOUS at 09:59

## 2024-09-25 RX ADMIN — BACLOFEN 10 MG: 10 TABLET ORAL at 09:44

## 2024-09-25 RX ADMIN — CETIRIZINE HYDROCHLORIDE 5 MG: 10 TABLET, FILM COATED ORAL at 09:44

## 2024-09-25 ASSESSMENT — PAIN SCALES - WONG BAKER
WONGBAKER_NUMERICALRESPONSE: NO HURT

## 2024-09-25 ASSESSMENT — PAIN SCALES - GENERAL: PAINLEVEL_OUTOF10: 0

## 2024-09-25 ASSESSMENT — PAIN - FUNCTIONAL ASSESSMENT: PAIN_FUNCTIONAL_ASSESSMENT: ACTIVITIES ARE NOT PREVENTED

## 2024-09-25 NOTE — CARE COORDINATION
9/25/2024  3:09 PM  Care Management Progress Note    Reason for Admission:   Septicemia (HCC) [A41.9]  Sepsis due to urinary tract infection (HCC) [A41.9, N39.0]         Patient Admission Status: Inpatient  RUR: 25 % High Risk of Readmission/Red   Hospitalization in the last 30 days (Readmission):  No        Transition of care plan:  Urology following, plan to treat prostatitis for 6 wks, Ucx pend  DC to [facility], director is Angy Nico [phone], CM placed TC to Ms. Walsh left HIPAA compliant  requesting call back   CM spoke w/ pt's guardian, Alejandra Hicks, she confirmed pt will not DC to [facility] and she has secured a bed at another  Adult Group Home in [facility].  Pt has an open APS case w/ [facility], SW is Mercedes Coleman [phone], [email or web address], CM placed TC to Ms. Coleman, left HIPAA compliant  requesting call back  CM spoke w/ caregiver Mitzi Blevins at bedside she has been pt's caregiver for several years and is very involved in his care, she is requesting daily updates from hospitalist during this admission, CM notified MD Do   Discharge plan communicated with patient and/or discharge caregiver: Yes    Outpatient follow-up PCP ,specialists   Transport at discharge: BLS/Medicaid   CM will follow and assist  EDWIN Galindo

## 2024-09-25 NOTE — CARE COORDINATION
9/25/2024  9:38 AM  CM placed TC to Christa Mata, pt's group home director re: return to facility when stable.   Christa advised me to call pt's guardian Alejandra Hicks.  CM spoke w/ Alejandra, pt's placement in [facility] is terminated as of 12:00 PM today, and she has another facility for pt to DC to in [facility], and will call this afternoon w/ details.  Will follow.  EDWIN Galindo

## 2024-09-26 ENCOUNTER — APPOINTMENT (OUTPATIENT)
Facility: HOSPITAL | Age: 59
DRG: 720 | End: 2024-09-26
Payer: MEDICAID

## 2024-09-26 LAB
ANION GAP SERPL CALC-SCNC: 6 MMOL/L (ref 2–12)
BACTERIA SPEC CULT: ABNORMAL
BACTERIA SPEC CULT: ABNORMAL
BUN SERPL-MCNC: 11 MG/DL (ref 6–20)
BUN/CREAT SERPL: 24 (ref 12–20)
CALCIUM SERPL-MCNC: 8.5 MG/DL (ref 8.5–10.1)
CC UR VC: ABNORMAL
CHLORIDE SERPL-SCNC: 107 MMOL/L (ref 97–108)
CO2 SERPL-SCNC: 24 MMOL/L (ref 21–32)
CREAT SERPL-MCNC: 0.46 MG/DL (ref 0.7–1.3)
CRP SERPL-MCNC: 5.59 MG/DL (ref 0–0.3)
ERYTHROCYTE [DISTWIDTH] IN BLOOD BY AUTOMATED COUNT: 13.3 % (ref 11.5–14.5)
GLUCOSE SERPL-MCNC: 91 MG/DL (ref 65–100)
HCT VFR BLD AUTO: 46.2 % (ref 36.6–50.3)
HGB BLD-MCNC: 15.1 G/DL (ref 12.1–17)
MAGNESIUM SERPL-MCNC: 2.2 MG/DL (ref 1.6–2.4)
MCH RBC QN AUTO: 32.4 PG (ref 26–34)
MCHC RBC AUTO-ENTMCNC: 32.7 G/DL (ref 30–36.5)
MCV RBC AUTO: 99.1 FL (ref 80–99)
NRBC # BLD: 0 K/UL (ref 0–0.01)
NRBC BLD-RTO: 0 PER 100 WBC
PHOSPHATE SERPL-MCNC: 3.3 MG/DL (ref 2.6–4.7)
PLATELET # BLD AUTO: 190 K/UL (ref 150–400)
PMV BLD AUTO: 10.3 FL (ref 8.9–12.9)
POTASSIUM SERPL-SCNC: 4.5 MMOL/L (ref 3.5–5.1)
RBC # BLD AUTO: 4.66 M/UL (ref 4.1–5.7)
SERVICE CMNT-IMP: ABNORMAL
SODIUM SERPL-SCNC: 137 MMOL/L (ref 136–145)
WBC # BLD AUTO: 5.3 K/UL (ref 4.1–11.1)

## 2024-09-26 PROCEDURE — 1100000000 HC RM PRIVATE

## 2024-09-26 PROCEDURE — 74018 RADEX ABDOMEN 1 VIEW: CPT

## 2024-09-26 PROCEDURE — 6360000002 HC RX W HCPCS: Performed by: INTERNAL MEDICINE

## 2024-09-26 PROCEDURE — 2580000003 HC RX 258: Performed by: INTERNAL MEDICINE

## 2024-09-26 PROCEDURE — 6370000000 HC RX 637 (ALT 250 FOR IP): Performed by: HOSPITALIST

## 2024-09-26 PROCEDURE — 80048 BASIC METABOLIC PNL TOTAL CA: CPT

## 2024-09-26 PROCEDURE — 84100 ASSAY OF PHOSPHORUS: CPT

## 2024-09-26 PROCEDURE — 83735 ASSAY OF MAGNESIUM: CPT

## 2024-09-26 PROCEDURE — 94761 N-INVAS EAR/PLS OXIMETRY MLT: CPT

## 2024-09-26 PROCEDURE — 6360000004 HC RX CONTRAST MEDICATION: Performed by: STUDENT IN AN ORGANIZED HEALTH CARE EDUCATION/TRAINING PROGRAM

## 2024-09-26 PROCEDURE — 36415 COLL VENOUS BLD VENIPUNCTURE: CPT

## 2024-09-26 PROCEDURE — 6360000002 HC RX W HCPCS: Performed by: HOSPITALIST

## 2024-09-26 PROCEDURE — 86140 C-REACTIVE PROTEIN: CPT

## 2024-09-26 PROCEDURE — 6370000000 HC RX 637 (ALT 250 FOR IP): Performed by: INTERNAL MEDICINE

## 2024-09-26 PROCEDURE — 85027 COMPLETE CBC AUTOMATED: CPT

## 2024-09-26 PROCEDURE — 2580000003 HC RX 258: Performed by: HOSPITALIST

## 2024-09-26 RX ORDER — DIATRIZOATE MEGLUMINE AND DIATRIZOATE SODIUM 660; 100 MG/ML; MG/ML
120 SOLUTION ORAL; RECTAL ONCE
Status: COMPLETED | OUTPATIENT
Start: 2024-09-26 | End: 2024-09-26

## 2024-09-26 RX ORDER — LEVOFLOXACIN 500 MG/1
500 TABLET, FILM COATED ORAL DAILY
Status: DISCONTINUED | OUTPATIENT
Start: 2024-09-26 | End: 2024-09-27

## 2024-09-26 RX ADMIN — SODIUM CHLORIDE, PRESERVATIVE FREE 10 ML: 5 INJECTION INTRAVENOUS at 09:49

## 2024-09-26 RX ADMIN — MIDODRINE HYDROCHLORIDE 5 MG: 5 TABLET ORAL at 17:53

## 2024-09-26 RX ADMIN — BACLOFEN 10 MG: 10 TABLET ORAL at 09:29

## 2024-09-26 RX ADMIN — MEROPENEM 1000 MG: 1 INJECTION INTRAVENOUS at 02:43

## 2024-09-26 RX ADMIN — PANTOPRAZOLE SODIUM 40 MG: 40 INJECTION, POWDER, FOR SOLUTION INTRAVENOUS at 09:30

## 2024-09-26 RX ADMIN — LEVOFLOXACIN 500 MG: 500 TABLET, FILM COATED ORAL at 11:49

## 2024-09-26 RX ADMIN — MIDODRINE HYDROCHLORIDE 5 MG: 5 TABLET ORAL at 11:49

## 2024-09-26 RX ADMIN — ENOXAPARIN SODIUM 30 MG: 100 INJECTION SUBCUTANEOUS at 09:30

## 2024-09-26 RX ADMIN — RISPERIDONE 2 MG: 1 SOLUTION ORAL at 09:30

## 2024-09-26 RX ADMIN — DIATRIZOATE MEGLUMINE AND DIATRIZOATE SODIUM 50 ML: 660; 100 LIQUID ORAL; RECTAL at 21:46

## 2024-09-26 RX ADMIN — CETIRIZINE HYDROCHLORIDE 5 MG: 10 TABLET, FILM COATED ORAL at 09:29

## 2024-09-26 RX ADMIN — WATER 1000 MG: 1 INJECTION INTRAMUSCULAR; INTRAVENOUS; SUBCUTANEOUS at 09:30

## 2024-09-26 RX ADMIN — BACLOFEN 10 MG: 10 TABLET ORAL at 15:13

## 2024-09-26 RX ADMIN — MIDODRINE HYDROCHLORIDE 5 MG: 5 TABLET ORAL at 09:29

## 2024-09-26 RX ADMIN — GLYCOPYRROLATE 1 MG: 1 TABLET ORAL at 09:29

## 2024-09-26 ASSESSMENT — PAIN SCALES - WONG BAKER
WONGBAKER_NUMERICALRESPONSE: NO HURT
WONGBAKER_NUMERICALRESPONSE: NO HURT

## 2024-09-26 ASSESSMENT — PAIN - FUNCTIONAL ASSESSMENT: PAIN_FUNCTIONAL_ASSESSMENT: ACTIVITIES ARE NOT PREVENTED

## 2024-09-26 ASSESSMENT — PAIN SCALES - GENERAL: PAINLEVEL_OUTOF10: 0

## 2024-09-27 LAB
GLUCOSE BLD STRIP.AUTO-MCNC: 99 MG/DL (ref 65–117)
SERVICE CMNT-IMP: NORMAL

## 2024-09-27 PROCEDURE — 94761 N-INVAS EAR/PLS OXIMETRY MLT: CPT

## 2024-09-27 PROCEDURE — 6370000000 HC RX 637 (ALT 250 FOR IP): Performed by: HOSPITALIST

## 2024-09-27 PROCEDURE — 6360000002 HC RX W HCPCS: Performed by: HOSPITALIST

## 2024-09-27 PROCEDURE — 2580000003 HC RX 258: Performed by: HOSPITALIST

## 2024-09-27 PROCEDURE — 82962 GLUCOSE BLOOD TEST: CPT

## 2024-09-27 PROCEDURE — 1100000000 HC RM PRIVATE

## 2024-09-27 PROCEDURE — 6360000002 HC RX W HCPCS: Performed by: INTERNAL MEDICINE

## 2024-09-27 RX ORDER — LEVOFLOXACIN 5 MG/ML
500 INJECTION, SOLUTION INTRAVENOUS EVERY 24 HOURS
Status: DISCONTINUED | OUTPATIENT
Start: 2024-09-27 | End: 2024-10-04

## 2024-09-27 RX ADMIN — LEVOFLOXACIN 500 MG: 5 INJECTION, SOLUTION INTRAVENOUS at 09:47

## 2024-09-27 RX ADMIN — MIRTAZAPINE 7.5 MG: 15 TABLET, FILM COATED ORAL at 20:32

## 2024-09-27 RX ADMIN — SODIUM CHLORIDE: 9 INJECTION, SOLUTION INTRAVENOUS at 09:42

## 2024-09-27 RX ADMIN — MIDODRINE HYDROCHLORIDE 5 MG: 5 TABLET ORAL at 10:06

## 2024-09-27 RX ADMIN — MIDODRINE HYDROCHLORIDE 5 MG: 5 TABLET ORAL at 18:47

## 2024-09-27 RX ADMIN — BACLOFEN 10 MG: 10 TABLET ORAL at 10:06

## 2024-09-27 RX ADMIN — MIDODRINE HYDROCHLORIDE 5 MG: 5 TABLET ORAL at 12:30

## 2024-09-27 RX ADMIN — ENOXAPARIN SODIUM 30 MG: 100 INJECTION SUBCUTANEOUS at 10:07

## 2024-09-27 RX ADMIN — CETIRIZINE HYDROCHLORIDE 5 MG: 10 TABLET, FILM COATED ORAL at 10:06

## 2024-09-27 RX ADMIN — BACLOFEN 10 MG: 10 TABLET ORAL at 20:32

## 2024-09-27 RX ADMIN — SODIUM CHLORIDE, PRESERVATIVE FREE 10 ML: 5 INJECTION INTRAVENOUS at 20:33

## 2024-09-27 RX ADMIN — RISPERIDONE 2 MG: 1 SOLUTION ORAL at 10:07

## 2024-09-27 RX ADMIN — PANTOPRAZOLE SODIUM 40 MG: 40 INJECTION, POWDER, FOR SOLUTION INTRAVENOUS at 10:07

## 2024-09-27 RX ADMIN — RISPERIDONE 2 MG: 1 SOLUTION ORAL at 20:32

## 2024-09-27 RX ADMIN — BACLOFEN 10 MG: 10 TABLET ORAL at 14:15

## 2024-09-27 RX ADMIN — GLYCOPYRROLATE 1 MG: 1 TABLET ORAL at 10:07

## 2024-09-27 ASSESSMENT — PAIN SCALES - WONG BAKER
WONGBAKER_NUMERICALRESPONSE: NO HURT

## 2024-09-27 ASSESSMENT — PAIN SCALES - GENERAL
PAINLEVEL_OUTOF10: 0
PAINLEVEL_OUTOF10: 0

## 2024-09-27 NOTE — FLOWSHEET NOTE
Fluid leaking from around PEG-J tube insertion site. No respiratory distress/ concern for recurrent aspiration. PEG-J w/ satisfactory position per radiology. Tube feedings and med admin held. Derfer tube management to GI. Consult placed for AM.

## 2024-09-27 NOTE — CONSULTS
Comprehensive Nutrition Assessment    Type and Reason for Visit:  Consult (TF management)    Nutrition Recommendations/Plan:   Begin PEG-J feedings: Ana Osborne Peptide 1.5 at 65 mL x14 hrs/day (6am-8p)  FWF 90 mL at start and every 2 hours during feeding (8 total flushes)    Keep patient's head/shoulders > 30 degrees during feeding times    Do NOT ever check residuals on this feeding tube      Obtain measured weight & document      Malnutrition Assessment:  Malnutrition Status:  No malnutrition (09/23/24 1208)    Context:  Acute Illness     Findings of the 6 clinical characteristics of malnutrition:  Energy Intake:  No significant decrease in energy intake  Weight Loss:  No significant weight loss     Body Fat Loss:  No significant body fat loss     Muscle Mass Loss:  No significant muscle mass loss    Fluid Accumulation:  No significant fluid accumulation     Strength:  Not Performed    Nutrition Assessment:      58 year old male admitted for Sepsis due to urinary tract infection (HCC) [A41.9, N39.0] who  has a past medical history of Anxiety, Aspiration pneumonia (HCC), Cerebral palsy (HCC), Contracted, joint, multiple sites, Developmental non-verbal disorder, Encephalopathy chronic, Gallstones, GERD (gastroesophageal reflux disease), Hip dysplasia, Ill-defined condition, Intellectual disability, Mass of bladder, Microcephaly (HCC), Muscle spasm, PFO (patent foramen ovale), and Urinary retention. Caregiver at bedside. Pt is familiar to facility. Pt remains in ER and possibly awaiting hospital bed. Nutrition related labs are unremarkable. +UTI.   Limited supply of specialized TF on site, discussed with caregiver and they will bring some tomorrow & RD ordered as well.     Nutrition Related Findings:      Wound Type: None       Last BM:   caregiver reports last BM 9/22 at 5pm  Edema:   none                    Nutr. Labs:  Lab Results   Component Value Date    CREATININE 0.61 (L) 09/22/2024    BUN 17 09/22/2024    
Palliative Medicine  Patient Name: Ayden Simpson  YOB: 1965  MRN: [id number]  Age: 58 y.o.  Gender: male    Date of Initial Consult: 9/23/2024  Date of Service: 9/23/2024  Time: 10:09 PM  Provider: SKYLER Olivier NP  Hospital Day: 2  Admit Date: 9/22/2024  Referring Provider: Dr. Estevez       Reasons for Consultation:  Goals of Care    HISTORY OF PRESENT ILLNESS (HPI):   Ayden Simpson is a 58 y.o. male who  who was admitted on 9/22/2024 from [facility] with a diagnosis possible UTI and suspected aspiration pna.     Calvin Kang presented to ER w/ cc of worsening congestion. Care Giver concerned he may have aspirated.     ER Workup- WBC >18. CXR showed mild pulmonary edema.     PMH: Hypertonic cerebral palsy  w/ contractures, wheel chair dependent, S/P PEG (2020) -->J tube exchange 2/2 reflux (6/19/24), PFO w/ severe L->R shunt, Atrial septal aneurysm, , LA Mass?, chronic encephalopathy, bladder mass, gerd, anxiety, hip dysplasia, contracted upper ext, peg tube   8/8-  8/12/24 hospitalization for Sepsis, hypoxic respiratory failure, aspiration PNA, after vomiting episode. Required straight cath d/t urinary retention..     6/2-6/26/2024 hospitalization for Sepsis, Aspiration  PNA,  hypoxic respiratory failure. S/p  PEG /J tube exchange to reduce risk of aspiration.    Course of hospitalization- Intermittent fevers, has not required supplemental O2.     Psychosocial:  pt has been living in current medical group home since Feb 2024.       PALLIATIVE DIAGNOSES:    Palliative care encounter  Cerebral Palsy  Severe contractures  Debility    ASSESSMENT AND PLAN:   Today, I am meeting with Mr. Ayden Simpson to discuss GOC in setting of severe debility and multiple hospitalizations   Prior to visit completed extensive chart review, including review of documentation, vitals, MARs and results of labs and imaging.   Mr. Simpson was seen and evaluated, a nurse from his medical home was at bedside.   Patient 
Palliative Medicine  Patient Name: Ayden Simpson  YOB: 1965  MRN: [id number]  Age: 58 y.o.  Gender: male    Date of Initial Consult: 9/23/2024  Date of Service: 9/25/2024  Time: 4:43 PM  Provider: SKYLER Olivier NP  Hospital Day: 4  Admit Date: 9/22/2024  Referring Provider: Dr. Estevez       Reasons for Consultation:  Goals of Care    HISTORY OF PRESENT ILLNESS (HPI):   Ayden Simpson is a 58 y.o. male who  who was admitted on 9/22/2024 from [facility] with a diagnosis possible UTI, enlarged prostate concerning for malignancy and suspected aspiration pna.     Calvin Kang presented to ER w/ cc of worsening congestion. Care Giver concerned he may have aspirated.     ER Workup- WBC >18. CXR showed mild pulmonary edema.     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 Required straight cath d/t urinary retention..     6/2-6/26/2024 hospitalization for Sepsis, Aspiration  PNA,  hypoxic respiratory failure. S/p  PEG /J tube exchange to reduce risk of aspiration.      Psychosocial:  pt has been living in current medical group home since Feb 2024.  Guardian has known Ayden since 2007.      PALLIATIVE DIAGNOSES:    Palliative care encounter  Cerebral Palsy  Severe contractures/debility  DNR discussion    ASSESSMENT AND PLAN:   Follow-up today with Mr. Ayden Simpson to discuss CODE STATUS and treatment preferences moving forward.  Completed chart review for updated information prior to visit.  Mr. Simpson was seen and evaluated, he had a sitter at bedside.  Ayden was laying in bed awake, much more alert than my initial 
solution 7 mLs by Per G Tube route daily 7ml= 300mg      loratadine (CLARITIN) 5 MG/5ML solution 10 mLs by Per G Tube route daily      polyethylene glycol (GLYCOLAX) 17 GM/SCOOP powder 8 g by Per G Tube route daily      acetaminophen (TYLENOL) 160 MG/5ML suspension 20 mLs by Per G Tube route in the morning and 20 mLs in the evening.      bisacodyl (DULCOLAX) 10 MG suppository Place 1 suppository rectally daily as needed for Constipation      guaiFENesin (ROBITUSSIN) 100 MG/5ML liquid 10 mLs by Per G Tube route 4 times daily as needed for Congestion      mirtazapine (REMERON SOL-TAB) 15 MG disintegrating tablet 0.5 tablets by Per G Tube route nightly      ipratropium 0.5 mg-albuterol 2.5 mg (DUONEB) 0.5-2.5 (3) MG/3ML SOLN nebulizer solution Inhale 3 mLs into the lungs every 4 hours as needed for Shortness of Breath or Wheezing 360 mL 1    LORazepam (ATIVAN) 2 MG/ML concentrated solution 1 mL by Per G Tube route every 6 hours as needed for Agitation.      glycopyrrolate (CUVPOSA) 1 MG/5ML SOLN solution GIVE 5ML (1MG) VIA PEG TUBE EVERY DAY FOR EXCESSIVE SALIVA ** REORDER WHEN LOW ** 150 mL 5    vitamin D (CHOLECALCIFEROL) 50 MCG (2000 UT) TABS tablet TAKE ONE TABLET CRUSHED BY PEG TUBE DAILY FOR SUPPLEMENT 29 tablet 5    risperiDONE (RISPERDAL) 1 MG/ML oral solution GIVE 1ML BY G-TUBE 3 TIMES A DAY FOR ANXIETY, LASHING OUT, KICKING, THROWING SELF OUT OF WHEELCHAIR AGGRESSIVELY, OR YELLING. (Patient taking differently: GIVE 2ML BY G-TUBE 3 TIMES A DAY FOR ANXIETY, LASHING OUT, KICKING, THROWING SELF OUT OF WHEELCHAIR AGGRESSIVELY, OR YELLING.) 90 mL 0    baclofen (LIORESAL) 10 MG tablet TAKE ONE TABLET 3 TIMES A DAY CRUSHED PER PEG TUBE FOR MUSCLE SPASMS 93 tablet 0    OMEPRAZOLE PO 40 mg by Enteral route daily         Allergies:  No Known Allergies    Social History:  Social History     Tobacco Use    Smoking status: Never    Smokeless tobacco: Never   Substance Use Topics    Alcohol use: Never    Drug use: Never 
  06/30/24 95%   06/29/24 94%   06/28/24 97%          Intake/Output Summary (Last 24 hours) at 9/27/2024 1101  Last data filed at 9/26/2024 1758  Gross per 24 hour   Intake 0 ml   Output 500 ml   Net -500 ml        General: no distress and chronically ill appearing  Head: Normocephalic, without obvious abnormality, atraumatic  Eyes: anicteric sclerae and conjuntiva clear  Lungs: normal respiratory effort  Abd:  PEG site clean and dry, some erythema underlying PEG bumper and bandage, PEG bumper 2-2.5 cm from skin, tube freely rotates 360 degrees, tube can slip back and forth freely, not distended, soft, nontender, BS present and normactive  Ext: no cyanosis and no edema  Skin: normal skin color, no rashes, and texture normal  Neuro:  Can not assess at this time due to medical condition  Psych: not anxious     Laboratory:    Recent Results (from the past 24 hour(s))   POCT Glucose    Collection Time: 09/27/24  1:55 AM   Result Value Ref Range    POC Glucose 99 65 - 117 mg/dL    Performed by: Jacinta Roque RN          Assessment/Plan:     Principal Problem:    Sepsis due to urinary tract infection (HCC)  Active Problems:    Cerebral palsy (HCC)    Lives in group home    Intellectual disability    Gastrostomy tube dependent (HCC)    Dysphagia    Wheelchair dependence    Iron deficiency anemia    Long term current use of antipsychotic medication    Severe sepsis (HCC)    Severe protein-energy malnutrition (HCC)    Aspiration pneumonia (HCC)    Palliative care encounter    Debility    Urinary tract infection without hematuria    DNR (do not resuscitate) discussion  Resolved Problems:    * No resolved hospital problems. *       See above narrative for full detail.
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
## 2024-09-28 LAB
ANION GAP SERPL CALC-SCNC: 6 MMOL/L (ref 2–12)
BACTERIA SPEC CULT: NORMAL
BUN SERPL-MCNC: 16 MG/DL (ref 6–20)
BUN/CREAT SERPL: 25 (ref 12–20)
CALCIUM SERPL-MCNC: 9.2 MG/DL (ref 8.5–10.1)
CHLORIDE SERPL-SCNC: 106 MMOL/L (ref 97–108)
CO2 SERPL-SCNC: 24 MMOL/L (ref 21–32)
CREAT SERPL-MCNC: 0.65 MG/DL (ref 0.7–1.3)
CRP SERPL-MCNC: 1.26 MG/DL (ref 0–0.3)
ERYTHROCYTE [DISTWIDTH] IN BLOOD BY AUTOMATED COUNT: 13.3 % (ref 11.5–14.5)
GLUCOSE SERPL-MCNC: 125 MG/DL (ref 65–100)
HCT VFR BLD AUTO: 46.7 % (ref 36.6–50.3)
HGB BLD-MCNC: 15.9 G/DL (ref 12.1–17)
MAGNESIUM SERPL-MCNC: 2.4 MG/DL (ref 1.6–2.4)
MCH RBC QN AUTO: 32.9 PG (ref 26–34)
MCHC RBC AUTO-ENTMCNC: 34 G/DL (ref 30–36.5)
MCV RBC AUTO: 96.7 FL (ref 80–99)
NRBC # BLD: 0 K/UL (ref 0–0.01)
NRBC BLD-RTO: 0 PER 100 WBC
PHOSPHATE SERPL-MCNC: 3.8 MG/DL (ref 2.6–4.7)
PLATELET # BLD AUTO: 282 K/UL (ref 150–400)
PMV BLD AUTO: 9.6 FL (ref 8.9–12.9)
POTASSIUM SERPL-SCNC: 3.8 MMOL/L (ref 3.5–5.1)
RBC # BLD AUTO: 4.83 M/UL (ref 4.1–5.7)
SERVICE CMNT-IMP: NORMAL
SODIUM SERPL-SCNC: 136 MMOL/L (ref 136–145)
WBC # BLD AUTO: 5.8 K/UL (ref 4.1–11.1)

## 2024-09-28 PROCEDURE — 1100000000 HC RM PRIVATE

## 2024-09-28 PROCEDURE — 83735 ASSAY OF MAGNESIUM: CPT

## 2024-09-28 PROCEDURE — 85027 COMPLETE CBC AUTOMATED: CPT

## 2024-09-28 PROCEDURE — 6360000002 HC RX W HCPCS: Performed by: HOSPITALIST

## 2024-09-28 PROCEDURE — 80048 BASIC METABOLIC PNL TOTAL CA: CPT

## 2024-09-28 PROCEDURE — 36415 COLL VENOUS BLD VENIPUNCTURE: CPT

## 2024-09-28 PROCEDURE — 6360000002 HC RX W HCPCS: Performed by: INTERNAL MEDICINE

## 2024-09-28 PROCEDURE — 86140 C-REACTIVE PROTEIN: CPT

## 2024-09-28 PROCEDURE — 2580000003 HC RX 258: Performed by: HOSPITALIST

## 2024-09-28 PROCEDURE — 84100 ASSAY OF PHOSPHORUS: CPT

## 2024-09-28 PROCEDURE — 6370000000 HC RX 637 (ALT 250 FOR IP): Performed by: HOSPITALIST

## 2024-09-28 RX ADMIN — RISPERIDONE 2 MG: 1 SOLUTION ORAL at 22:20

## 2024-09-28 RX ADMIN — MIRTAZAPINE 7.5 MG: 15 TABLET, FILM COATED ORAL at 22:20

## 2024-09-28 RX ADMIN — MIDODRINE HYDROCHLORIDE 5 MG: 5 TABLET ORAL at 12:31

## 2024-09-28 RX ADMIN — LEVOFLOXACIN 500 MG: 5 INJECTION, SOLUTION INTRAVENOUS at 09:04

## 2024-09-28 RX ADMIN — BACLOFEN 10 MG: 10 TABLET ORAL at 17:04

## 2024-09-28 RX ADMIN — ENOXAPARIN SODIUM 30 MG: 100 INJECTION SUBCUTANEOUS at 08:46

## 2024-09-28 RX ADMIN — PANTOPRAZOLE SODIUM 40 MG: 40 INJECTION, POWDER, FOR SOLUTION INTRAVENOUS at 08:45

## 2024-09-28 RX ADMIN — SODIUM CHLORIDE, PRESERVATIVE FREE 10 ML: 5 INJECTION INTRAVENOUS at 21:00

## 2024-09-28 RX ADMIN — MIDODRINE HYDROCHLORIDE 5 MG: 5 TABLET ORAL at 08:45

## 2024-09-28 RX ADMIN — RISPERIDONE 2 MG: 1 SOLUTION ORAL at 08:45

## 2024-09-28 RX ADMIN — CETIRIZINE HYDROCHLORIDE 5 MG: 10 TABLET, FILM COATED ORAL at 08:45

## 2024-09-28 RX ADMIN — MIDODRINE HYDROCHLORIDE 5 MG: 5 TABLET ORAL at 17:04

## 2024-09-28 RX ADMIN — BACLOFEN 10 MG: 10 TABLET ORAL at 22:20

## 2024-09-28 RX ADMIN — BACLOFEN 10 MG: 10 TABLET ORAL at 08:45

## 2024-09-28 RX ADMIN — GLYCOPYRROLATE 1 MG: 1 TABLET ORAL at 08:45

## 2024-09-29 PROCEDURE — 2580000003 HC RX 258: Performed by: HOSPITALIST

## 2024-09-29 PROCEDURE — 6360000002 HC RX W HCPCS: Performed by: INTERNAL MEDICINE

## 2024-09-29 PROCEDURE — 1100000000 HC RM PRIVATE

## 2024-09-29 PROCEDURE — 6360000002 HC RX W HCPCS: Performed by: HOSPITALIST

## 2024-09-29 PROCEDURE — 2500000003 HC RX 250 WO HCPCS: Performed by: HOSPITALIST

## 2024-09-29 PROCEDURE — 94761 N-INVAS EAR/PLS OXIMETRY MLT: CPT

## 2024-09-29 PROCEDURE — 6370000000 HC RX 637 (ALT 250 FOR IP): Performed by: HOSPITALIST

## 2024-09-29 RX ADMIN — MICONAZOLE NITRATE: 2 POWDER TOPICAL at 11:13

## 2024-09-29 RX ADMIN — PANTOPRAZOLE SODIUM 40 MG: 40 INJECTION, POWDER, FOR SOLUTION INTRAVENOUS at 08:44

## 2024-09-29 RX ADMIN — ENOXAPARIN SODIUM 30 MG: 100 INJECTION SUBCUTANEOUS at 08:44

## 2024-09-29 RX ADMIN — SODIUM CHLORIDE, PRESERVATIVE FREE 10 ML: 5 INJECTION INTRAVENOUS at 08:45

## 2024-09-29 RX ADMIN — CETIRIZINE HYDROCHLORIDE 5 MG: 10 TABLET, FILM COATED ORAL at 08:43

## 2024-09-29 RX ADMIN — SODIUM CHLORIDE, PRESERVATIVE FREE 10 ML: 5 INJECTION INTRAVENOUS at 22:02

## 2024-09-29 RX ADMIN — BACLOFEN 10 MG: 10 TABLET ORAL at 16:17

## 2024-09-29 RX ADMIN — MIDODRINE HYDROCHLORIDE 5 MG: 5 TABLET ORAL at 12:09

## 2024-09-29 RX ADMIN — MIRTAZAPINE 7.5 MG: 15 TABLET, FILM COATED ORAL at 22:01

## 2024-09-29 RX ADMIN — BACLOFEN 10 MG: 10 TABLET ORAL at 08:44

## 2024-09-29 RX ADMIN — GLYCOPYRROLATE 1 MG: 1 TABLET ORAL at 08:43

## 2024-09-29 RX ADMIN — MIDODRINE HYDROCHLORIDE 5 MG: 5 TABLET ORAL at 08:44

## 2024-09-29 RX ADMIN — MIDODRINE HYDROCHLORIDE 5 MG: 5 TABLET ORAL at 16:17

## 2024-09-29 RX ADMIN — BACLOFEN 10 MG: 10 TABLET ORAL at 22:01

## 2024-09-29 RX ADMIN — RISPERIDONE 2 MG: 1 SOLUTION ORAL at 08:44

## 2024-09-29 RX ADMIN — RISPERIDONE 2 MG: 1 SOLUTION ORAL at 22:01

## 2024-09-29 RX ADMIN — MICONAZOLE NITRATE: 2 POWDER TOPICAL at 22:01

## 2024-09-29 RX ADMIN — LEVOFLOXACIN 500 MG: 5 INJECTION, SOLUTION INTRAVENOUS at 08:45

## 2024-09-29 ASSESSMENT — PAIN SCALES - GENERAL
PAINLEVEL_OUTOF10: 0
PAINLEVEL_OUTOF10: 0

## 2024-09-30 LAB
BACTERIA SPEC CULT: NORMAL
SERVICE CMNT-IMP: NORMAL

## 2024-09-30 PROCEDURE — 6370000000 HC RX 637 (ALT 250 FOR IP): Performed by: HOSPITALIST

## 2024-09-30 PROCEDURE — 1100000000 HC RM PRIVATE

## 2024-09-30 PROCEDURE — 2580000003 HC RX 258: Performed by: HOSPITALIST

## 2024-09-30 PROCEDURE — 6360000002 HC RX W HCPCS: Performed by: HOSPITALIST

## 2024-09-30 PROCEDURE — 6360000002 HC RX W HCPCS: Performed by: INTERNAL MEDICINE

## 2024-09-30 RX ADMIN — MICONAZOLE NITRATE: 2 POWDER TOPICAL at 22:20

## 2024-09-30 RX ADMIN — MIDODRINE HYDROCHLORIDE 5 MG: 5 TABLET ORAL at 17:32

## 2024-09-30 RX ADMIN — CETIRIZINE HYDROCHLORIDE 5 MG: 10 TABLET, FILM COATED ORAL at 07:57

## 2024-09-30 RX ADMIN — PANTOPRAZOLE SODIUM 40 MG: 40 INJECTION, POWDER, FOR SOLUTION INTRAVENOUS at 07:58

## 2024-09-30 RX ADMIN — BACLOFEN 10 MG: 10 TABLET ORAL at 22:20

## 2024-09-30 RX ADMIN — SODIUM CHLORIDE, PRESERVATIVE FREE 10 ML: 5 INJECTION INTRAVENOUS at 07:58

## 2024-09-30 RX ADMIN — RISPERIDONE 2 MG: 1 SOLUTION ORAL at 07:57

## 2024-09-30 RX ADMIN — LEVOFLOXACIN 500 MG: 5 INJECTION, SOLUTION INTRAVENOUS at 09:12

## 2024-09-30 RX ADMIN — SODIUM CHLORIDE, PRESERVATIVE FREE 10 ML: 5 INJECTION INTRAVENOUS at 22:21

## 2024-09-30 RX ADMIN — MICONAZOLE NITRATE: 2 POWDER TOPICAL at 07:59

## 2024-09-30 RX ADMIN — RISPERIDONE 2 MG: 1 SOLUTION ORAL at 22:20

## 2024-09-30 RX ADMIN — BACLOFEN 10 MG: 10 TABLET ORAL at 14:14

## 2024-09-30 RX ADMIN — GLYCOPYRROLATE 1 MG: 1 TABLET ORAL at 07:58

## 2024-09-30 RX ADMIN — BACLOFEN 10 MG: 10 TABLET ORAL at 07:57

## 2024-09-30 RX ADMIN — POLYETHYLENE GLYCOL 3350 8.5 G: 17 POWDER, FOR SOLUTION ORAL at 07:59

## 2024-09-30 RX ADMIN — MIRTAZAPINE 7.5 MG: 15 TABLET, FILM COATED ORAL at 22:20

## 2024-09-30 RX ADMIN — MIDODRINE HYDROCHLORIDE 5 MG: 5 TABLET ORAL at 11:58

## 2024-09-30 RX ADMIN — MIDODRINE HYDROCHLORIDE 5 MG: 5 TABLET ORAL at 07:58

## 2024-09-30 RX ADMIN — ENOXAPARIN SODIUM 30 MG: 100 INJECTION SUBCUTANEOUS at 07:58
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
## 2024-10-01 PROCEDURE — 2580000003 HC RX 258: Performed by: HOSPITALIST

## 2024-10-01 PROCEDURE — 94761 N-INVAS EAR/PLS OXIMETRY MLT: CPT

## 2024-10-01 PROCEDURE — 1100000000 HC RM PRIVATE

## 2024-10-01 PROCEDURE — 6360000002 HC RX W HCPCS: Performed by: INTERNAL MEDICINE

## 2024-10-01 PROCEDURE — 6370000000 HC RX 637 (ALT 250 FOR IP): Performed by: HOSPITALIST

## 2024-10-01 PROCEDURE — 6360000002 HC RX W HCPCS: Performed by: HOSPITALIST

## 2024-10-01 RX ADMIN — CETIRIZINE HYDROCHLORIDE 5 MG: 10 TABLET, FILM COATED ORAL at 07:35

## 2024-10-01 RX ADMIN — RISPERIDONE 2 MG: 1 SOLUTION ORAL at 07:33

## 2024-10-01 RX ADMIN — LEVOFLOXACIN 500 MG: 5 INJECTION, SOLUTION INTRAVENOUS at 09:38

## 2024-10-01 RX ADMIN — POLYETHYLENE GLYCOL 3350 8.5 G: 17 POWDER, FOR SOLUTION ORAL at 07:34

## 2024-10-01 RX ADMIN — ENOXAPARIN SODIUM 30 MG: 100 INJECTION SUBCUTANEOUS at 07:33

## 2024-10-01 RX ADMIN — BACLOFEN 10 MG: 10 TABLET ORAL at 14:05

## 2024-10-01 RX ADMIN — MIDODRINE HYDROCHLORIDE 5 MG: 5 TABLET ORAL at 12:27

## 2024-10-01 RX ADMIN — PANTOPRAZOLE SODIUM 40 MG: 40 INJECTION, POWDER, FOR SOLUTION INTRAVENOUS at 07:33

## 2024-10-01 RX ADMIN — MICONAZOLE NITRATE: 2 POWDER TOPICAL at 07:34

## 2024-10-01 RX ADMIN — GLYCOPYRROLATE 1 MG: 1 TABLET ORAL at 07:34

## 2024-10-01 RX ADMIN — RISPERIDONE 2 MG: 1 SOLUTION ORAL at 22:07

## 2024-10-01 RX ADMIN — BACLOFEN 10 MG: 10 TABLET ORAL at 22:07

## 2024-10-01 RX ADMIN — MICONAZOLE NITRATE: 2 POWDER TOPICAL at 22:08

## 2024-10-01 RX ADMIN — BACLOFEN 10 MG: 10 TABLET ORAL at 07:34

## 2024-10-01 RX ADMIN — MIDODRINE HYDROCHLORIDE 5 MG: 5 TABLET ORAL at 16:49

## 2024-10-01 RX ADMIN — SODIUM CHLORIDE, PRESERVATIVE FREE 10 ML: 5 INJECTION INTRAVENOUS at 22:08

## 2024-10-01 RX ADMIN — SODIUM CHLORIDE, PRESERVATIVE FREE 10 ML: 5 INJECTION INTRAVENOUS at 07:35

## 2024-10-01 RX ADMIN — MIDODRINE HYDROCHLORIDE 5 MG: 5 TABLET ORAL at 07:36

## 2024-10-01 RX ADMIN — MIRTAZAPINE 7.5 MG: 15 TABLET, FILM COATED ORAL at 22:08

## 2024-10-01 NOTE — CARE COORDINATION
Care Management Progress Note    Reason for Admission:   Septicemia (HCC) [A41.9]  Sepsis due to urinary tract infection (HCC) [A41.9, N39.0]         Patient Admission Status: Inpatient  RUR:   Hospitalization in the last 30 days (Readmission):  No        Transition of care plan:  Patient medically cleared for DC.  Patient unable to return to previous adult home [facility]. Call placed to public guardian Alejandra Fabiola [phone] who reports she has found placement for the individual at [facility] but they need agency coverage for 24/7 skilled nursing which is pending. Alejandra anticipates 1-2 more days before this can be arranged but she is working on it.  Discharge plan communicated with patient and/or discharge caregiver: Yes    Date 1st IMM letter given: NA  Outpatient follow-up: PCP/Specialist  Transport at discharge:  Medicaid LAYNE Bond, [place]  [phone]

## 2024-10-02 PROCEDURE — 2580000003 HC RX 258: Performed by: HOSPITALIST

## 2024-10-02 PROCEDURE — 6360000002 HC RX W HCPCS: Performed by: INTERNAL MEDICINE

## 2024-10-02 PROCEDURE — 6370000000 HC RX 637 (ALT 250 FOR IP): Performed by: HOSPITALIST

## 2024-10-02 PROCEDURE — 1100000000 HC RM PRIVATE

## 2024-10-02 PROCEDURE — 94761 N-INVAS EAR/PLS OXIMETRY MLT: CPT

## 2024-10-02 PROCEDURE — 6360000002 HC RX W HCPCS: Performed by: HOSPITALIST

## 2024-10-02 RX ADMIN — MICONAZOLE NITRATE: 2 POWDER TOPICAL at 21:54

## 2024-10-02 RX ADMIN — POLYETHYLENE GLYCOL 3350 8.5 G: 17 POWDER, FOR SOLUTION ORAL at 07:34

## 2024-10-02 RX ADMIN — GLYCOPYRROLATE 1 MG: 1 TABLET ORAL at 07:33

## 2024-10-02 RX ADMIN — ENOXAPARIN SODIUM 30 MG: 100 INJECTION SUBCUTANEOUS at 07:32

## 2024-10-02 RX ADMIN — LEVOFLOXACIN 500 MG: 5 INJECTION, SOLUTION INTRAVENOUS at 07:54

## 2024-10-02 RX ADMIN — ONDANSETRON 4 MG: 2 INJECTION INTRAMUSCULAR; INTRAVENOUS at 14:12

## 2024-10-02 RX ADMIN — MICONAZOLE NITRATE: 2 POWDER TOPICAL at 07:33

## 2024-10-02 RX ADMIN — MIDODRINE HYDROCHLORIDE 5 MG: 5 TABLET ORAL at 07:32

## 2024-10-02 RX ADMIN — RISPERIDONE 2 MG: 1 SOLUTION ORAL at 07:32

## 2024-10-02 RX ADMIN — SODIUM CHLORIDE, PRESERVATIVE FREE 10 ML: 5 INJECTION INTRAVENOUS at 07:33

## 2024-10-02 RX ADMIN — MIDODRINE HYDROCHLORIDE 5 MG: 5 TABLET ORAL at 17:08

## 2024-10-02 RX ADMIN — BACLOFEN 10 MG: 10 TABLET ORAL at 07:32

## 2024-10-02 RX ADMIN — BACLOFEN 10 MG: 10 TABLET ORAL at 13:57

## 2024-10-02 RX ADMIN — MIDODRINE HYDROCHLORIDE 5 MG: 5 TABLET ORAL at 12:22

## 2024-10-02 RX ADMIN — BACLOFEN 10 MG: 10 TABLET ORAL at 21:54

## 2024-10-02 RX ADMIN — RISPERIDONE 2 MG: 1 SOLUTION ORAL at 21:54

## 2024-10-02 RX ADMIN — MIRTAZAPINE 7.5 MG: 15 TABLET, FILM COATED ORAL at 21:54

## 2024-10-02 RX ADMIN — PANTOPRAZOLE SODIUM 40 MG: 40 INJECTION, POWDER, FOR SOLUTION INTRAVENOUS at 07:33

## 2024-10-02 RX ADMIN — SODIUM CHLORIDE, PRESERVATIVE FREE 10 ML: 5 INJECTION INTRAVENOUS at 21:55

## 2024-10-02 RX ADMIN — CETIRIZINE HYDROCHLORIDE 5 MG: 10 TABLET, FILM COATED ORAL at 07:32

## 2024-10-02 ASSESSMENT — PAIN SCALES - GENERAL: PAINLEVEL_OUTOF10: 0

## 2024-10-02 ASSESSMENT — PAIN SCALES - WONG BAKER: WONGBAKER_NUMERICALRESPONSE: NO HURT

## 2024-10-02 NOTE — FLOWSHEET NOTE
10/02/24 0600   Patient Observation   Patient Observations Patient Tube Feeding started, line patent, pt resting in bed, nos/s of acute distress noted.

## 2024-10-03 PROCEDURE — 6360000002 HC RX W HCPCS: Performed by: INTERNAL MEDICINE

## 2024-10-03 PROCEDURE — 6370000000 HC RX 637 (ALT 250 FOR IP): Performed by: HOSPITALIST

## 2024-10-03 PROCEDURE — 1100000000 HC RM PRIVATE

## 2024-10-03 PROCEDURE — 94761 N-INVAS EAR/PLS OXIMETRY MLT: CPT

## 2024-10-03 PROCEDURE — 6360000002 HC RX W HCPCS: Performed by: HOSPITALIST

## 2024-10-03 PROCEDURE — 2580000003 HC RX 258: Performed by: HOSPITALIST

## 2024-10-03 RX ADMIN — MIDODRINE HYDROCHLORIDE 5 MG: 5 TABLET ORAL at 10:13

## 2024-10-03 RX ADMIN — LEVOFLOXACIN 500 MG: 5 INJECTION, SOLUTION INTRAVENOUS at 10:14

## 2024-10-03 RX ADMIN — RISPERIDONE 2 MG: 1 SOLUTION ORAL at 10:13

## 2024-10-03 RX ADMIN — BACLOFEN 10 MG: 10 TABLET ORAL at 20:38

## 2024-10-03 RX ADMIN — BACLOFEN 10 MG: 10 TABLET ORAL at 10:13

## 2024-10-03 RX ADMIN — SODIUM CHLORIDE, PRESERVATIVE FREE 10 ML: 5 INJECTION INTRAVENOUS at 19:56

## 2024-10-03 RX ADMIN — MIDODRINE HYDROCHLORIDE 5 MG: 5 TABLET ORAL at 18:01

## 2024-10-03 RX ADMIN — MIDODRINE HYDROCHLORIDE 5 MG: 5 TABLET ORAL at 13:46

## 2024-10-03 RX ADMIN — ENOXAPARIN SODIUM 30 MG: 100 INJECTION SUBCUTANEOUS at 10:12

## 2024-10-03 RX ADMIN — MICONAZOLE NITRATE: 2 POWDER TOPICAL at 10:14

## 2024-10-03 RX ADMIN — SODIUM CHLORIDE, PRESERVATIVE FREE 10 ML: 5 INJECTION INTRAVENOUS at 10:29

## 2024-10-03 RX ADMIN — MIRTAZAPINE 7.5 MG: 15 TABLET, FILM COATED ORAL at 20:38

## 2024-10-03 RX ADMIN — GLYCOPYRROLATE 1 MG: 1 TABLET ORAL at 10:13

## 2024-10-03 RX ADMIN — MICONAZOLE NITRATE: 2 POWDER TOPICAL at 20:38

## 2024-10-03 RX ADMIN — BACLOFEN 10 MG: 10 TABLET ORAL at 13:46

## 2024-10-03 RX ADMIN — ONDANSETRON 4 MG: 2 INJECTION INTRAMUSCULAR; INTRAVENOUS at 18:01

## 2024-10-03 RX ADMIN — PANTOPRAZOLE SODIUM 40 MG: 40 INJECTION, POWDER, FOR SOLUTION INTRAVENOUS at 10:13

## 2024-10-03 RX ADMIN — CETIRIZINE HYDROCHLORIDE 5 MG: 10 TABLET, FILM COATED ORAL at 10:13

## 2024-10-03 RX ADMIN — RISPERIDONE 2 MG: 1 SOLUTION ORAL at 20:37

## 2024-10-03 ASSESSMENT — PAIN SCALES - GENERAL
PAINLEVEL_OUTOF10: 0

## 2024-10-03 ASSESSMENT — PAIN SCALES - WONG BAKER
WONGBAKER_NUMERICALRESPONSE: NO HURT
WONGBAKER_NUMERICALRESPONSE: NO HURT

## 2024-10-03 NOTE — CARE COORDINATION
Care Management Progress Note    Reason for Admission:   Septicemia (HCC) [A41.9]  Sepsis due to urinary tract infection (HCC) [A41.9, N39.0]         Patient Admission Status: Inpatient  RUR:   Hospitalization in the last 30 days (Readmission):  No        Transition of care plan:  Patient medically cleared for DC.  CM confirmed with public guardian Alejandra HartFabiola [phone] that the patient has placement at [facility] an 24/7 nursing will be in place on Monday.  Discharge plan communicated with patient and/or discharge caregiver: Yes    Date 1st IMM letter given: NA  Outpatient follow-up: PCP, Specialist  Transport at discharge: LAYNE Bond,[place]  [phone]

## 2024-10-04 PROCEDURE — 6370000000 HC RX 637 (ALT 250 FOR IP): Performed by: HOSPITALIST

## 2024-10-04 PROCEDURE — 6360000002 HC RX W HCPCS: Performed by: INTERNAL MEDICINE

## 2024-10-04 PROCEDURE — 94761 N-INVAS EAR/PLS OXIMETRY MLT: CPT

## 2024-10-04 PROCEDURE — 6370000000 HC RX 637 (ALT 250 FOR IP): Performed by: INTERNAL MEDICINE

## 2024-10-04 PROCEDURE — 1100000000 HC RM PRIVATE

## 2024-10-04 PROCEDURE — 6360000002 HC RX W HCPCS: Performed by: HOSPITALIST

## 2024-10-04 PROCEDURE — 2580000003 HC RX 258: Performed by: HOSPITALIST

## 2024-10-04 RX ORDER — FAMOTIDINE 40 MG/5ML
20 POWDER, FOR SUSPENSION ORAL 2 TIMES DAILY
Status: DISCONTINUED | OUTPATIENT
Start: 2024-10-04 | End: 2024-10-07 | Stop reason: HOSPADM

## 2024-10-04 RX ORDER — LEVOFLOXACIN 500 MG/1
500 TABLET, FILM COATED ORAL DAILY
Status: DISCONTINUED | OUTPATIENT
Start: 2024-10-05 | End: 2024-10-07 | Stop reason: HOSPADM

## 2024-10-04 RX ADMIN — RISPERIDONE 2 MG: 1 SOLUTION ORAL at 21:37

## 2024-10-04 RX ADMIN — MIDODRINE HYDROCHLORIDE 5 MG: 5 TABLET ORAL at 12:24

## 2024-10-04 RX ADMIN — MIDODRINE HYDROCHLORIDE 5 MG: 5 TABLET ORAL at 08:17

## 2024-10-04 RX ADMIN — MIDODRINE HYDROCHLORIDE 5 MG: 5 TABLET ORAL at 17:15

## 2024-10-04 RX ADMIN — LEVOFLOXACIN 500 MG: 5 INJECTION, SOLUTION INTRAVENOUS at 08:15

## 2024-10-04 RX ADMIN — MICONAZOLE NITRATE: 2 POWDER TOPICAL at 08:17

## 2024-10-04 RX ADMIN — BACLOFEN 10 MG: 10 TABLET ORAL at 08:17

## 2024-10-04 RX ADMIN — MICONAZOLE NITRATE: 2 POWDER TOPICAL at 21:38

## 2024-10-04 RX ADMIN — CETIRIZINE HYDROCHLORIDE 5 MG: 10 TABLET, FILM COATED ORAL at 08:17

## 2024-10-04 RX ADMIN — Medication 20 MG: at 21:37

## 2024-10-04 RX ADMIN — ENOXAPARIN SODIUM 30 MG: 100 INJECTION SUBCUTANEOUS at 08:17

## 2024-10-04 RX ADMIN — BACLOFEN 10 MG: 10 TABLET ORAL at 21:37

## 2024-10-04 RX ADMIN — RISPERIDONE 2 MG: 1 SOLUTION ORAL at 08:18

## 2024-10-04 RX ADMIN — BACLOFEN 10 MG: 10 TABLET ORAL at 14:09

## 2024-10-04 RX ADMIN — SODIUM CHLORIDE, PRESERVATIVE FREE 10 ML: 5 INJECTION INTRAVENOUS at 08:18

## 2024-10-04 RX ADMIN — GLYCOPYRROLATE 1 MG: 1 TABLET ORAL at 08:17

## 2024-10-04 RX ADMIN — Medication 20 MG: at 09:33

## 2024-10-04 RX ADMIN — MIRTAZAPINE 7.5 MG: 15 TABLET, FILM COATED ORAL at 21:37

## 2024-10-04 RX ADMIN — PANTOPRAZOLE SODIUM 40 MG: 40 INJECTION, POWDER, FOR SOLUTION INTRAVENOUS at 08:17

## 2024-10-04 ASSESSMENT — PAIN SCALES - GENERAL
PAINLEVEL_OUTOF10: 0
PAINLEVEL_OUTOF10: 0

## 2024-10-04 NOTE — CARE COORDINATION
Care Management Progress Note    Reason for Admission:   Septicemia (HCC) [A41.9]  Sepsis due to urinary tract infection (HCC) [A41.9, N39.0]         Patient Admission Status: Inpatient  RUR:   Hospitalization in the last 30 days (Readmission):  No        Transition of care plan:  Patient is medically stable for DC.  DC to [facility] adult home.  Discharge plan communicated with patient and/or discharge caregiver: Yes    Date 1st IMM letter given: NA  Outpatient follow-up PCP/Specialist  Transport at discharge: Medicaid BLS Stretcher    BRITTNY met with patient's nurse/caregiver Mitzi Trujilloram, [phone]. Patient is still planned for DC Monday around 1pm. Mitzi will email CM the address to [facility] which is the new home patient will be DC to. She will also be emailing the plan of care for the MD to sign. Patient's meds will need to be sent to a new pharmacy as well and this info will be provided to CM on Monday. CM to monitor.    Kate Bond, MS  [phone]

## 2024-10-05 PROCEDURE — 94761 N-INVAS EAR/PLS OXIMETRY MLT: CPT

## 2024-10-05 PROCEDURE — 6370000000 HC RX 637 (ALT 250 FOR IP): Performed by: HOSPITALIST

## 2024-10-05 PROCEDURE — 2580000003 HC RX 258: Performed by: HOSPITALIST

## 2024-10-05 PROCEDURE — 6370000000 HC RX 637 (ALT 250 FOR IP): Performed by: INTERNAL MEDICINE

## 2024-10-05 PROCEDURE — 1100000000 HC RM PRIVATE

## 2024-10-05 PROCEDURE — 6360000002 HC RX W HCPCS: Performed by: HOSPITALIST

## 2024-10-05 RX ADMIN — MIDODRINE HYDROCHLORIDE 5 MG: 5 TABLET ORAL at 18:38

## 2024-10-05 RX ADMIN — SODIUM CHLORIDE 5 ML/HR: 9 INJECTION, SOLUTION INTRAVENOUS at 09:02

## 2024-10-05 RX ADMIN — CETIRIZINE HYDROCHLORIDE 5 MG: 10 TABLET, FILM COATED ORAL at 08:38

## 2024-10-05 RX ADMIN — ENOXAPARIN SODIUM 30 MG: 100 INJECTION SUBCUTANEOUS at 08:38

## 2024-10-05 RX ADMIN — BACLOFEN 10 MG: 10 TABLET ORAL at 13:14

## 2024-10-05 RX ADMIN — RISPERIDONE 2 MG: 1 SOLUTION ORAL at 20:00

## 2024-10-05 RX ADMIN — MIDODRINE HYDROCHLORIDE 5 MG: 5 TABLET ORAL at 08:39

## 2024-10-05 RX ADMIN — RISPERIDONE 2 MG: 1 SOLUTION ORAL at 08:39

## 2024-10-05 RX ADMIN — Medication 20 MG: at 20:00

## 2024-10-05 RX ADMIN — SODIUM CHLORIDE, PRESERVATIVE FREE 10 ML: 5 INJECTION INTRAVENOUS at 20:01

## 2024-10-05 RX ADMIN — MIDODRINE HYDROCHLORIDE 5 MG: 5 TABLET ORAL at 13:14

## 2024-10-05 RX ADMIN — MICONAZOLE NITRATE: 2 POWDER TOPICAL at 20:01

## 2024-10-05 RX ADMIN — BACLOFEN 10 MG: 10 TABLET ORAL at 08:39

## 2024-10-05 RX ADMIN — BACLOFEN 10 MG: 10 TABLET ORAL at 20:00

## 2024-10-05 RX ADMIN — LEVOFLOXACIN 500 MG: 500 TABLET, FILM COATED ORAL at 08:39

## 2024-10-05 RX ADMIN — MICONAZOLE NITRATE: 2 POWDER TOPICAL at 08:41

## 2024-10-05 RX ADMIN — Medication 20 MG: at 08:39

## 2024-10-05 RX ADMIN — MIRTAZAPINE 7.5 MG: 15 TABLET, FILM COATED ORAL at 20:00

## 2024-10-05 RX ADMIN — GLYCOPYRROLATE 1 MG: 1 TABLET ORAL at 08:39

## 2024-10-05 ASSESSMENT — PAIN SCALES - GENERAL
PAINLEVEL_OUTOF10: 0

## 2024-10-05 ASSESSMENT — PAIN SCALES - WONG BAKER
WONGBAKER_NUMERICALRESPONSE: NO HURT
WONGBAKER_NUMERICALRESPONSE: NO HURT

## 2024-10-06 PROCEDURE — 6370000000 HC RX 637 (ALT 250 FOR IP): Performed by: INTERNAL MEDICINE

## 2024-10-06 PROCEDURE — 6370000000 HC RX 637 (ALT 250 FOR IP): Performed by: HOSPITALIST

## 2024-10-06 PROCEDURE — 94761 N-INVAS EAR/PLS OXIMETRY MLT: CPT

## 2024-10-06 PROCEDURE — 6360000002 HC RX W HCPCS: Performed by: HOSPITALIST

## 2024-10-06 PROCEDURE — 2580000003 HC RX 258: Performed by: HOSPITALIST

## 2024-10-06 PROCEDURE — 1100000000 HC RM PRIVATE

## 2024-10-06 RX ADMIN — RISPERIDONE 2 MG: 1 SOLUTION ORAL at 23:07

## 2024-10-06 RX ADMIN — MIDODRINE HYDROCHLORIDE 5 MG: 5 TABLET ORAL at 09:13

## 2024-10-06 RX ADMIN — RISPERIDONE 2 MG: 1 SOLUTION ORAL at 09:13

## 2024-10-06 RX ADMIN — BACLOFEN 10 MG: 10 TABLET ORAL at 13:05

## 2024-10-06 RX ADMIN — MICONAZOLE NITRATE: 2 POWDER TOPICAL at 23:08

## 2024-10-06 RX ADMIN — LEVOFLOXACIN 500 MG: 500 TABLET, FILM COATED ORAL at 09:14

## 2024-10-06 RX ADMIN — SODIUM CHLORIDE, PRESERVATIVE FREE 10 ML: 5 INJECTION INTRAVENOUS at 09:16

## 2024-10-06 RX ADMIN — GLYCOPYRROLATE 1 MG: 1 TABLET ORAL at 09:14

## 2024-10-06 RX ADMIN — MIRTAZAPINE 7.5 MG: 15 TABLET, FILM COATED ORAL at 23:07

## 2024-10-06 RX ADMIN — ENOXAPARIN SODIUM 30 MG: 100 INJECTION SUBCUTANEOUS at 09:15

## 2024-10-06 RX ADMIN — Medication 20 MG: at 09:14

## 2024-10-06 RX ADMIN — Medication 20 MG: at 23:07

## 2024-10-06 RX ADMIN — MICONAZOLE NITRATE: 2 POWDER TOPICAL at 09:14

## 2024-10-06 RX ADMIN — BACLOFEN 10 MG: 10 TABLET ORAL at 09:13

## 2024-10-06 RX ADMIN — BACLOFEN 10 MG: 10 TABLET ORAL at 23:06

## 2024-10-06 RX ADMIN — MIDODRINE HYDROCHLORIDE 5 MG: 5 TABLET ORAL at 13:05

## 2024-10-06 RX ADMIN — MIDODRINE HYDROCHLORIDE 5 MG: 5 TABLET ORAL at 16:17

## 2024-10-06 RX ADMIN — CETIRIZINE HYDROCHLORIDE 5 MG: 10 TABLET, FILM COATED ORAL at 09:13

## 2024-10-06 RX ADMIN — SODIUM CHLORIDE, PRESERVATIVE FREE 10 ML: 5 INJECTION INTRAVENOUS at 23:07

## 2024-10-07 VITALS
OXYGEN SATURATION: 98 % | HEART RATE: 97 BPM | WEIGHT: 100.1 LBS | SYSTOLIC BLOOD PRESSURE: 93 MMHG | BODY MASS INDEX: 19.65 KG/M2 | DIASTOLIC BLOOD PRESSURE: 67 MMHG | TEMPERATURE: 99.3 F | RESPIRATION RATE: 16 BRPM | HEIGHT: 60 IN

## 2024-10-07 PROBLEM — Z71.89 GOALS OF CARE, COUNSELING/DISCUSSION: Status: ACTIVE | Noted: 2024-10-07

## 2024-10-07 PROBLEM — Z71.89 ADVANCED CARE PLANNING/COUNSELING DISCUSSION: Status: ACTIVE | Noted: 2024-10-07

## 2024-10-07 PROCEDURE — 6370000000 HC RX 637 (ALT 250 FOR IP): Performed by: INTERNAL MEDICINE

## 2024-10-07 PROCEDURE — 2580000003 HC RX 258: Performed by: HOSPITALIST

## 2024-10-07 PROCEDURE — 51701 INSERT BLADDER CATHETER: CPT

## 2024-10-07 PROCEDURE — 94761 N-INVAS EAR/PLS OXIMETRY MLT: CPT

## 2024-10-07 PROCEDURE — 6370000000 HC RX 637 (ALT 250 FOR IP): Performed by: HOSPITALIST

## 2024-10-07 PROCEDURE — 6360000002 HC RX W HCPCS: Performed by: HOSPITALIST

## 2024-10-07 PROCEDURE — 51798 US URINE CAPACITY MEASURE: CPT

## 2024-10-07 RX ORDER — MIDODRINE HYDROCHLORIDE 5 MG/1
5 TABLET ORAL
Qty: 90 TABLET | Refills: 0 | Status: SHIPPED | OUTPATIENT
Start: 2024-10-07 | End: 2024-11-06

## 2024-10-07 RX ORDER — BISACODYL 10 MG
10 SUPPOSITORY, RECTAL RECTAL DAILY PRN
Qty: 20 SUPPOSITORY | Refills: 0 | Status: SHIPPED | OUTPATIENT
Start: 2024-10-07 | End: 2024-11-06

## 2024-10-07 RX ORDER — LEVOFLOXACIN 500 MG/1
500 TABLET, FILM COATED ORAL DAILY
Qty: 30 TABLET | Refills: 0 | Status: SHIPPED | OUTPATIENT
Start: 2024-10-08 | End: 2024-11-07

## 2024-10-07 RX ORDER — LORAZEPAM 2 MG/ML
2 CONCENTRATE ORAL EVERY 6 HOURS PRN
Qty: 10 ML | Refills: 0 | Status: SHIPPED | OUTPATIENT
Start: 2024-10-07 | End: 2024-10-17

## 2024-10-07 RX ORDER — IPRATROPIUM BROMIDE AND ALBUTEROL SULFATE 2.5; .5 MG/3ML; MG/3ML
3 SOLUTION RESPIRATORY (INHALATION) EVERY 4 HOURS PRN
Qty: 360 ML | Refills: 1 | Status: SHIPPED | OUTPATIENT
Start: 2024-10-07

## 2024-10-07 RX ORDER — RISPERIDONE 1 MG/ML
2 SOLUTION ORAL 2 TIMES DAILY
Qty: 40 ML | Refills: 0 | Status: SHIPPED | OUTPATIENT
Start: 2024-10-07 | End: 2024-10-17

## 2024-10-07 RX ORDER — GUAIFENESIN 200 MG/10ML
200 LIQUID ORAL 4 TIMES DAILY PRN
Qty: 1 EACH | Refills: 0 | Status: SHIPPED | OUTPATIENT
Start: 2024-10-07 | End: 2024-11-06

## 2024-10-07 RX ORDER — BACLOFEN 10 MG/1
10 TABLET ORAL 3 TIMES DAILY
Qty: 30 TABLET | Refills: 0 | Status: SHIPPED | OUTPATIENT
Start: 2024-10-07 | End: 2024-10-17

## 2024-10-07 RX ORDER — ACETAMINOPHEN 160 MG/5ML
640.39 SUSPENSION ORAL 2 TIMES DAILY
Qty: 240 ML | Refills: 3 | Status: SHIPPED | OUTPATIENT
Start: 2024-10-07

## 2024-10-07 RX ORDER — CHOLECALCIFEROL (VITAMIN D3) 50 MCG
2000 TABLET ORAL DAILY
Qty: 29 TABLET | Refills: 5 | Status: SHIPPED | OUTPATIENT
Start: 2024-10-07

## 2024-10-07 RX ORDER — LORATADINE ORAL 5 MG/5ML
10 SOLUTION ORAL DAILY
Qty: 300 ML | Refills: 0 | Status: SHIPPED | OUTPATIENT
Start: 2024-10-07 | End: 2024-11-06

## 2024-10-07 RX ORDER — FINASTERIDE 5 MG/1
5 TABLET, FILM COATED ORAL DAILY
Qty: 30 TABLET | Refills: 0 | Status: SHIPPED | OUTPATIENT
Start: 2024-10-07 | End: 2024-11-06

## 2024-10-07 RX ORDER — MIRTAZAPINE 15 MG/1
7.5 TABLET, ORALLY DISINTEGRATING ORAL NIGHTLY
Qty: 5 TABLET | Refills: 0 | Status: SHIPPED | OUTPATIENT
Start: 2024-10-07 | End: 2024-10-17

## 2024-10-07 RX ORDER — FAMOTIDINE 40 MG/5ML
20 POWDER, FOR SUSPENSION ORAL 2 TIMES DAILY
Qty: 150 ML | Refills: 3 | Status: SHIPPED | OUTPATIENT
Start: 2024-10-07

## 2024-10-07 RX ORDER — GLYCOPYRROLATE 1 MG/5ML
SOLUTION ORAL
Qty: 150 ML | Refills: 5 | Status: SHIPPED | OUTPATIENT
Start: 2024-10-07

## 2024-10-07 RX ORDER — POLYETHYLENE GLYCOL 3350 17 G/17G
8 POWDER, FOR SOLUTION ORAL DAILY
Qty: 300 G | Refills: 0 | Status: SHIPPED | OUTPATIENT
Start: 2024-10-07 | End: 2024-11-06

## 2024-10-07 RX ADMIN — CETIRIZINE HYDROCHLORIDE 5 MG: 10 TABLET, FILM COATED ORAL at 08:47

## 2024-10-07 RX ADMIN — SODIUM CHLORIDE, PRESERVATIVE FREE 10 ML: 5 INJECTION INTRAVENOUS at 08:49

## 2024-10-07 RX ADMIN — GLYCOPYRROLATE 1 MG: 1 TABLET ORAL at 08:47

## 2024-10-07 RX ADMIN — MIDODRINE HYDROCHLORIDE 5 MG: 5 TABLET ORAL at 12:17

## 2024-10-07 RX ADMIN — Medication 20 MG: at 08:55

## 2024-10-07 RX ADMIN — BACLOFEN 10 MG: 10 TABLET ORAL at 14:20

## 2024-10-07 RX ADMIN — LEVOFLOXACIN 500 MG: 500 TABLET, FILM COATED ORAL at 08:47

## 2024-10-07 RX ADMIN — ENOXAPARIN SODIUM 30 MG: 100 INJECTION SUBCUTANEOUS at 08:55

## 2024-10-07 RX ADMIN — MICONAZOLE NITRATE: 2 POWDER TOPICAL at 08:49

## 2024-10-07 RX ADMIN — BACLOFEN 10 MG: 10 TABLET ORAL at 08:47

## 2024-10-07 RX ADMIN — RISPERIDONE 2 MG: 1 SOLUTION ORAL at 08:48

## 2024-10-07 RX ADMIN — MIDODRINE HYDROCHLORIDE 5 MG: 5 TABLET ORAL at 16:53

## 2024-10-07 RX ADMIN — MIDODRINE HYDROCHLORIDE 5 MG: 5 TABLET ORAL at 08:47

## 2024-10-07 RX ADMIN — POLYETHYLENE GLYCOL 3350 8.5 G: 17 POWDER, FOR SOLUTION ORAL at 08:48

## 2024-10-07 ASSESSMENT — PAIN SCALES - GENERAL
PAINLEVEL_OUTOF10: 0

## 2024-10-07 ASSESSMENT — PAIN SCALES - WONG BAKER: WONGBAKER_NUMERICALRESPONSE: NO HURT

## 2024-10-07 NOTE — ACP (ADVANCE CARE PLANNING)
Advance Care Planning      Palliative Medicine Provider (MD/NP)  Advance Care Planning (ACP) Conversation      Date of Conversation: 10/07/24  The patient and/or authorized decision maker consented to a voluntary Advance Care Planning conversation.   Individuals present for the conversation:   Legal Guardian Alejandra Hicks    Legal Healthcare Agent(s):    Primary Decision Maker (Active): Alejandra Hicks - Legal Guardian, Legal Guardian - [phone]    ACP documents available in EMR prior to discussion:  -Legal Guardianship Document    Primary Palliative Diagnosis(es):  Palliative care encounter  Severe, spastic/hypertonic cerebral palsy  Debility  Nonverbal  Non-communicative  DNR discussion    Conversation Summary: Mr. Simpson is nonverbal, unable to communicate via ASL or communication board and unable to follow simple commands.  Discussed hospitalization and medical concerns with his legal guardian Alejandra.  Fortunately, legal guardian is very involved and has known Mr. Simpson x years.    DNR discussion-Mr. Simpson continues to have a full CODE STATUS.  We discussed CPR, benefits versus burdens, with particular focus on the burdens, that would not expect a good outcome, if survived, would likely suffer.   Aleajndra verbalized her understanding, but was very honest and said she is not ready for him to have a DNR, but that she is getting closer to making that decision.      Resuscitation Status:    Code Status: Full Code    Outcomes / Completed Documentation:  An explanation of advance directives and their importance was provided and the following forms completed:    -No new documents completed.    If new document completed, original was provided to patient and/or family member.    Copy was placed for scanning into the [facility] EMR.      I spent 25 minutes providing separately identifiable ACP services with the patient and/or surrogate decision maker in a voluntary, in-person conversation discussing the patient's wishes and goals

## 2024-10-07 NOTE — CARE COORDINATION
Care Management Progress Note    Reason for Admission:   Septicemia (HCC) [A41.9]  Sepsis due to urinary tract infection (HCC) [A41.9, N39.0]         Patient Admission Status: Inpatient  RUR:   Hospitalization in the last 30 days (Readmission):  No        Transition of care plan:  Patient is medically stable for DC today.   Patient will be DC to his new adult/group home [facility] located at [address] [place] [address]  [facility], [id number].  Discharge plan communicated with patient and/or discharge caregiver: Yes    Date 1st IMM letter given: NA  Outpatient follow-up PCP/Specialist  Transport at discharge: FRIEDA  at 1700.   Nurse call report to Mitzi Blevins at [phone].  All current meds need to be refilled and sent to [facility] pharmacy.       Kate Bond MS

## 2024-10-07 NOTE — DISCHARGE INSTRUCTIONS
HOSPITALIST DISCHARGE INSTRUCTIONS  NAME:  Ayden Simpson   :  1965   MRN:  [id number]     Date/Time:  10/7/2024 10:13 AM    ADMIT DATE: 2024     DISCHARGE DATE: 10/7/2024     DISCHARGE DIAGNOSIS:  Urinary tract infection    DISCHARGE INSTRUCTIONS:  Thank you for allowing us to participate in your care. Your discharging Hospitalist is Santino Estevez MD. You were admitted for evaluation and treatment of the following:    Urinary tract infection / Urine retention / Enlarged prostate - You had recurrent obstruction despite Proscar.  You were evaluated by urology, but there are no plans for surgical intervention. Urine cultures had both Klebsiella and Enterococcus. You will complete 4 more weeks of crushed levaquin, given via PEG.  A concern for cancer was discussed with guardian. You are not a candidate for therapy, and so no further workup at this time.     Severe pro-alvina malnutrition / Gastrostomy tube dependent / Hypokalemia / Hypophosphatemia / Hypomagnesemia - You had J-tube placed on .  You had revision of J-tube on  by GI.  G-tube malfunction adjusted with bumper tightened by GI on . Continue tube feed per Nutrition. Pepcid liquid by PEG, miconazole wound care     Patent foramen ovale - No aggressive workup     Cerebral palsy / Intellectual disability / Wheelchair dependence / Long term current use of antipsychotic medication - Continue Remeron, baclofen, risperidone, prn ativan, frequent turns.       Sepsis - This was due to UTI with stone, and also perhaps aspiration pneumonia.  Sepsis resolved.     Hypotension - Continue midodrine.     Aspiration pneumonia - Possible recurrent.  Due to Tube feeds.  We expect this to be a recurring problem.     MEDICATIONS:    It is important that you take the medication exactly as they are prescribed.   Keep your medication in the bottles provided by the pharmacist and keep a list of the medication names, dosages, and times to be taken in

## 2024-10-07 NOTE — PALLIATIVE CARE DISCHARGE
Goals of Care/Treatment Preferences    The Palliative Medicine team was consulted as part of your/your loved one's care in the hospital. Our team is a supportive service; we strive to relieve suffering and improve quality of life.     We reviewed advance care planning information, which includes the following:    Primary Decision Maker (Active): Alejandra Hicks - Legal Guardian,   Patient's Healthcare Decision Maker is:: Named in Scanned Document (Guardianship)      We reviewed / discussed your code status as:   Code Status: Full Code     “Full Code” means perform CPR in the event of cardiac arrest.      “DNR” means do NOT perform CPR in the event of cardiac arrest.      “Partial Code” means you have specific preferences, please discuss with your healthcare team.      “No Order” means this issue was not addressed / resolved during your stay    Medical Interventions: Full interventions    Artificially Administered Nutrition: Feeding tube long-term, if indicated    Because of the importance of this information, we are providing you with a printed copy to share with other healthcare providers after this hospitalization is complete.

## 2024-10-07 NOTE — PLAN OF CARE
Problem: Discharge Planning  Goal: Discharge to home or other facility with appropriate resources  Outcome: Not Progressing   Placement issue  
  Problem: Discharge Planning  Goal: Discharge to home or other facility with appropriate resources  Outcome: Not Progressing-barriers of discharge, lack of available placement.  Problem: Nutrition Deficit:  Goal: Optimize nutritional status  Outcome: Progressing     Problem: Safety - Adult  Goal: Free from fall injury  Outcome: Progressing     Problem: Pain  Goal: Verbalizes/displays adequate comfort level or baseline comfort level  Outcome: Progressing     Problem: Skin/Tissue Integrity  Goal: Absence of new skin breakdown  Description: 1.  Monitor for areas of redness and/or skin breakdown  2.  Assess vascular access sites hourly  3.  Every 4-6 hours minimum:  Change oxygen saturation probe site  4.  Every 4-6 hours:  If on nasal continuous positive airway pressure, respiratory therapy assess nares and determine need for appliance change or resting period.  Outcome: Progressing     
  Problem: Nutrition Deficit:  Goal: Optimize nutritional status  Outcome: Progressing     Problem: Safety - Adult  Goal: Free from fall injury  Outcome: Progressing     Problem: Discharge Planning  Goal: Discharge to home or other facility with appropriate resources  Outcome: Progressing     Problem: Pain  Goal: Verbalizes/displays adequate comfort level or baseline comfort level  Outcome: Progressing     Problem: Skin/Tissue Integrity  Goal: Absence of new skin breakdown  Description: 1.  Monitor for areas of redness and/or skin breakdown  2.  Assess vascular access sites hourly  3.  Every 4-6 hours minimum:  Change oxygen saturation probe site  4.  Every 4-6 hours:  If on nasal continuous positive airway pressure, respiratory therapy assess nares and determine need for appliance change or resting period.  Outcome: Progressing     
  Problem: Nutrition Deficit:  Goal: Optimize nutritional status  Outcome: Progressing  Flowsheets (Taken 9/25/2024 1203 by Emily Villanueva RD)  Nutrient intake appropriate for improving, restoring, or maintaining nutritional needs: Recommend, monitor, and adjust tube feedings and TPN/PPN based on assessed needs     Problem: Safety - Adult  Goal: Free from fall injury  9/25/2024 1605 by Jas Bolden RN  Outcome: Progressing  9/25/2024 0541 by Benny Meyer RN  Outcome: Progressing     Problem: Discharge Planning  Goal: Discharge to home or other facility with appropriate resources  9/25/2024 1605 by Jas Bolden RN  Outcome: Progressing  Flowsheets (Taken 9/25/2024 0745)  Discharge to home or other facility with appropriate resources: Identify barriers to discharge with patient and caregiver  9/25/2024 0541 by Benny Meyer RN  Outcome: Progressing  Flowsheets (Taken 9/24/2024 1951)  Discharge to home or other facility with appropriate resources: Identify barriers to discharge with patient and caregiver     Problem: Pain  Goal: Verbalizes/displays adequate comfort level or baseline comfort level  Outcome: Progressing     Problem: Skin/Tissue Integrity  Goal: Absence of new skin breakdown  Description: 1.  Monitor for areas of redness and/or skin breakdown  2.  Assess vascular access sites hourly  3.  Every 4-6 hours minimum:  Change oxygen saturation probe site  4.  Every 4-6 hours:  If on nasal continuous positive airway pressure, respiratory therapy assess nares and determine need for appliance change or resting period.  9/25/2024 1605 by Jas Bolden RN  Outcome: Progressing  9/25/2024 0541 by Benny Meyer RN  Outcome: Progressing     
  Problem: Safety - Adult  Goal: Free from fall injury  Outcome: Progressing     Problem: Skin/Tissue Integrity  Goal: Absence of new skin breakdown  Description: 1.  Monitor for areas of redness and/or skin breakdown  2.  Assess vascular access sites hourly  3.  Every 4-6 hours minimum:  Change oxygen saturation probe site  4.  Every 4-6 hours:  If on nasal continuous positive airway pressure, respiratory therapy assess nares and determine need for appliance change or resting period.  Outcome: Progressing     Problem: Pain  Goal: Verbalizes/displays adequate comfort level or baseline comfort level  Recent Flowsheet Documentation  Taken 9/24/2024 1947 by Benny Meyer RN  Verbalizes/displays adequate comfort level or baseline comfort level:   Assess pain using appropriate pain scale   Administer analgesics based on type and severity of pain and evaluate response     
Jas Bolden, RN  Outcome: Progressing  9/25/2024 0541 by Benny Meyer, RN  Outcome: Progressing

## 2024-10-07 NOTE — PROGRESS NOTES
Urology Progress Note    Patient: Ayden Simpson MRN: [id number]  SSN: [id number]    YOB: 1965  Age: 58 y.o.  Sex: male        Assessment:     AFVSS   NAD   Resolution of leukocytosis   Cr 0.46  UCX klebsiella & enterococcus- receiving rocephin     Plan:     Sepsis due to UTI/prostatitis, 9mm calculus in the left renal pelvis, bladder stones  -Culture driven abx. Given irregularity of prostate on imaging,fever, and leukocytosis -> treat for prostatitis x6 weeks.   -Bladder stones, 9mm calculus in the left renal pelvis can be followed outpatient for consideration of treatment. At this time, no hydronephrosis noted.  -Aspiration pneumonia. Treatment per primary team    Please call if needed     ROS:     Unable to obtain , nonverbal     Objective:     /67   Pulse 78   Temp 98.4 °F (36.9 °C) (Axillary)   Resp 16   Ht 1.448 m (4' 9\")   Wt 45.4 kg (100 lb 1.6 oz)   SpO2 95%   BMI 21.66 kg/m²       Intake/Output Summary (Last 24 hours) at 9/26/2024 0949  Last data filed at 9/26/2024 0851  Gross per 24 hour   Intake 100 ml   Output 1150 ml   Net -1050 ml       Physical Exam  General: NAD  Respiratory: no distress  Abdomen: soft, no distention  Neuro: nonverbal,exam limited    Labs reviewed, September 26, 2024  Recent Results (from the past 24 hour(s))   Basic Metabolic Panel    Collection Time: 09/26/24  8:07 AM   Result Value Ref Range    Sodium 137 136 - 145 mmol/L    Potassium 4.5 3.5 - 5.1 mmol/L    Chloride 107 97 - 108 mmol/L    CO2 24 21 - 32 mmol/L    Anion Gap 6 2 - 12 mmol/L    Glucose 91 65 - 100 mg/dL    BUN 11 6 - 20 MG/DL    Creatinine 0.46 (L) 0.70 - 1.30 MG/DL    BUN/Creatinine Ratio 24 (H) 12 - 20      Est, Glom Filt Rate >90 >60 ml/min/1.73m2    Calcium 8.5 8.5 - 10.1 MG/DL   C-Reactive Protein    Collection Time: 09/26/24  8:07 AM   Result Value Ref Range    CRP 5.59 (H) 0.00 - 0.30 mg/dL   CBC    Collection Time: 09/26/24  8:07 AM 
                                           Urology Progress Note    Patient: Ayden Simpson MRN: [id number]  SSN: [id number]    YOB: 1965  Age: 58 y.o.  Sex: male        Assessment:     Febrile 100.6 this morning   Cr 0.6   WBC 21.7 (18.4)   UCX prelim GNRs    Plan:     Sepsis due to UTI/prostatitis, 9mm calculus in the left renal pelvis, bladder stones  -Broaden antibiotics, adjust per final culture results. Given irregularity of prostate on imaging,fever, and leukocytosis -> treat for prostatitis x6 weeks.   -Bladder stones, 9mm calculus in the left renal pelvis can be followed outpatient for consideration of treatment. At this time, no hydronephrosis noted. If he begins with worsening renal function, abd/flank pain,consider re-imaging to ensure renal stone does not descend into ureter. If he has a persistent fever, worsening leukocytosis despite several days of culture appropriate abx,consider re-imaging to rule out abscess formation. Will need to consider re-imaging tomorrow pending leukocytosis / clinical picture  -Questionable aspiration pneumonia. Treatment per primary team    ROS:     Unable to obtain due to pt condition     Objective:     /68   Pulse 96   Temp 99.1 °F (37.3 °C) (Axillary)   Resp 16   Ht 1.448 m (4' 9\")   Wt 42.6 kg (94 lb)   SpO2 90%   BMI 20.34 kg/m²       Intake/Output Summary (Last 24 hours) at 9/24/2024 0914  Last data filed at 9/24/2024 0751  Gross per 24 hour   Intake 2387.75 ml   Output 0 ml   Net 2387.75 ml       Physical Exam  General: NAD  Respiratory: no distress, room air  Abdomen: soft, no distention  : no CVA tenderness  Neuro: exam limited     Labs reviewed, September 24, 2024  Recent Results (from the past 24 hour(s))   Comprehensive Metabolic Panel    Collection Time: 09/24/24  5:33 AM   Result Value Ref Range    Sodium 142 136 - 145 mmol/L    Potassium 4.4 3.5 - 5.1 mmol/L    Chloride 110 (H) 97 - 108 mmol/L    CO2 25 21 - 32 mmol/L    Anion Gap 
  Physician Progress Note      PATIENT:               MONIQUE CODY  CSN #:                  [id number]  :                       1965  ADMIT DATE:       2024 7:04 PM  DISCH DATE:  RESPONDING  PROVIDER #:        David MICHELE Do, MD          QUERY TEXT:    Good Afternoon    This patient admitted on 2024 for Sepsis due to UTi and Asp. Pneumonia.    The medical record also notes a diagnosis of ANA.    In order to support the diagnosis of ANA, please include additional clinical   indicators in your documentation.?  Or please document if the diagnosis of ANA has been ruled out after further   study.    The medical record reflects the following:  Risk Factors: CP, Sepsis, Aspiration Pneumonia, Gastrostomy tube dependent  Clinical Indicators: Creatinine on admission @ .61--on 2024, creat @   .60-  Treatment: x1 500cc NS bolus Daily labs, monitor I&O,    Thank you  EDWIN AdairN, RN, CPHQ, CCDS, SMART    -----------------------------------------    Defined by Kidney Disease Improving Global Outcomes (KDIGO) clinical practice   guideline for acute kidney injury:  -Increase in SCr by greater than or equal to 0.3 mg/dl within 48 hours; or  -Increase or decrease in SCr to greater than or equal to 1.5 times baseline,   which is known or presumed to have occurred within the prior 7 days; or  -Urine volume < 0.5ml/kg/h for 6 hours.  Options provided:  -- Acute kidney injury evidenced by, Please document evidence as well as a   numerical baseline creatinine, if known.  -- Acute kidney injury ruled out after study  -- Other - I will add my own diagnosis  -- Disagree - Not applicable / Not valid  -- Disagree - Clinically unable to determine / Unknown  -- Refer to Clinical Documentation Reviewer    PROVIDER RESPONSE TEXT:    Acute kidney injury was ruled out after study.    Query created by: Geovanna Pradhan on 2024 1:14 PM      Electronically signed by:  David MICHELE Do, MD 2024 2:11 PM          
  Physician Progress Note      PATIENT:               MONIQUE CODY  CSN #:                  [id number]  :                       1965  ADMIT DATE:       2024 7:04 PM  DISCH DATE:  RESPONDING  PROVIDER #:        David MICHELE Do, MD          QUERY TEXT:    Good Afternoon    This patient admitted on 2024- for Sepsis due to Asp. Pneumonia and UTI.    The H&P and progress notes also notes a diagnosis of Severe Protein calorie   malnutrition.  On 2024- RD was consulted and notes \"No Malnutrition\"    In order to support the diagnosis of Severe Protein calorie malnutrition,   please include additional clinical indicators in your documentation.  Or please document if the diagnosis of Severe protein calorie malnutrition has   been ruled out after further study.    The medical record reflects the following:  Risk Factors: CP, GT feed, hx of recurrent Asp. Pneumonia, GERD,  Clinical Indicators: Per RD assessment on 2024- \"No malnutrition\", NO   sign. decrease in energy intake, No significant weight loss, no significant   muscle mass loss, no significant fluid accumulation.  Treatment: RD consulted for PEG-J feeding, Keep pt head/shoulder > 30 degrees   during feeing times.  Obtain weight, Strict I&O    Thank you  Geovanna Pradhan. BSN.RN., CPHQ, CCDS, SMART  Options provided:  -- Severe Protein calorie malnutrition present as evidenced by, Please   document evidence.  -- Severe protein calorie malnutrition was ruled out  -- Other - I will add my own diagnosis  -- Disagree - Not applicable / Not valid  -- Disagree - Clinically unable to determine / Unknown  -- Refer to Clinical Documentation Reviewer    PROVIDER RESPONSE TEXT:    Severe protein calorie malnutrition is present as evidenced by muscle wasting    Query created by: Geovanna Pradhan on 2024 11:11 AM      Electronically signed by:  David MICHELE Do, MD 2024 11:27 AM          
  [facility]    Hospitalist Progress Note    NAME: Ayden Simpson   :  1965  MRM:  [id number]    Date/Time of service 10/6/2024  8:50 AM    To assist coordination of care and communication with nursing and staff, this note may be preliminary early in the day, but finalized by end of the day.        Assessment and Plan:     Urinary tract infection / Urine retention - POA, recurrent obstruction on Proscar.  Evaluated by urology with no plans for surgical intervention. Urine cultures noted to be growing both Klebsiella and Enterococcus, pansensitive.  Plan to complete 4 more weeks of levaquin, via PEG.     Severe pro-alvina malnutrition / Gastrostomy tube dependent / Dysphagia - POA, had J-tube placed on .  Revision of J-tube on  by GI.  G-tube malfunction adjusted with bumper tightened by GI on . Continue tube feed per Nutrition. PPI by PEG     Patent foramen ovale - Asymptomatic incidental finding on echo during last admission.  I would not advise aggressive workup     Cerebral palsy / Intellectual disability / Wheelchair dependence / Long term current use of antipsychotic medication - POA, severe. CM looking for another group home.  Continue Remeron, baclofen, risperidone, prn ativan, frequent turns.  Palliative consult to consider hospice with guardian Alejandra Fabiola [phone], Caretaker Mitzi, [phone].    Hypokalemia / Hypophosphatemia / Hypomagnesemia - Monitor and replete via PEG and tube feedings..    Sepsis / Fever / Leukocytosis / Sinus tachycardia - POA, due to UTI with stone, and also perhaps aspiration pneumonia.  Resolved    Acute kidney injury / dehydration / Hypotension - Hydrate and resolved. Continue midodrine.    Aspiration pneumonia - Possible, recurrent.  Due to Tube feeds.  Expect this to be a recurring problem. Antibiotics as above.       Subjective:     Chief Complaint:  cannot obtain    ROS:  (bold if positive, if negative)    Not Tolerating 
  [facility]    Hospitalist Progress Note    NAME: Ayden Simpson   :  1965  MRM:  [id number]    Date/Time of service 10/5/2024  8:29 AM    To assist coordination of care and communication with nursing and staff, this note may be preliminary early in the day, but finalized by end of the day.        Assessment and Plan:     Urinary tract infection / Urine retention - POA, recurrent obstruction on Proscar.  Evaluated by urology with no plans for surgical intervention. Urine cultures noted to be growing both Klebsiella and Enterococcus, pansensitive.  Plan to complete 4 more weeks of levaquin, via PEG.     Severe pro-alvina malnutrition / Gastrostomy tube dependent / Dysphagia - POA, had J-tube placed on .  Revision of J-tube on  by GI.  G-tube malfunction adjusted with bumper tightened by GI on . Continue tube feed per Nutrition. PPI by PEG     Patent foramen ovale - Asymptomatic incidental finding on echo during last admission.  I would not advise aggressive workup     Cerebral palsy / Intellectual disability / Wheelchair dependence / Long term current use of antipsychotic medication - POA, severe. CM looking for another group home.  Continue Remeron, baclofen, risperidone, prn ativan, frequent turns.  Palliative consult to consider hospice with guardian Alejandra Fabiola [phone], Caretaker Mitzi, [phone].    Hypokalemia / Hypophosphatemia / Hypomagnesemia - Monitor and replete via PEG and tube feedings..    Sepsis / Fever / Leukocytosis / Sinus tachycardia - POA, due to UTI with stone, and also perhaps aspiration pneumonia.  Resolved    Acute kidney injury / dehydration / Hypotension - Hydrate and resolved. Continue midodrine.    Aspiration pneumonia - Possible, recurrent.  Due to Tube feeds.  Expect this to be a recurring problem. Antibiotics as above.       Subjective:     Chief Complaint:  cannot obtain    ROS:  (bold if positive, if negative)    Not Tolerating 
0700: Bedside and Verbal shift change report given to Jas RN & Kem REYES (oncoming nurse) by Marvin RN & Benny RN (offgoing nurse). Report included the following information Nurse Handoff Report, Adult Overview, Intake/Output, MAR, Recent Results, and Cardiac Rhythm NSR .     1900: Bedside and Verbal shift change report given to Kelly RN (oncoming nurse) by Jas RN (offgoing nurse). Report included the following information Nurse Handoff Report, Adult Overview, Intake/Output, MAR, Recent Results, and Cardiac Rhythm NSR .     
1415-Hourly round on pt,large emesis x2 yellow and clear noted-tube feeding stopped,HOB elevated,zofran given,pt positioned on right side,no distress noted.    Provider notified-will resume feeding in 1hr,monitor tolerance.    1600-tube feeding resumed,pt turned on left side with HOB elevated.     1700-pt tolerating tube feeding.  
Comprehensive Nutrition Assessment    Type and Reason for Visit:  Reassess    Nutrition Recommendations/Plan:   Continue TF:  [employer] Peptide 1.5 at 65 mL x14 hrs/day (6am-8p)   FWF 90 mL at start and every 2 hours during feeding (8 total flushes)      Malnutrition Assessment:  Malnutrition Status: Insufficient data (not relevant 2/2 contratures, cerebral palsy)   Context:  Acute Illness     Findings of the 6 clinical characteristics of malnutrition:  Energy Intake:  No significant decrease in energy intake  Weight Loss:  No significant weight loss     Body Fat Loss:  No significant body fat loss     Muscle Mass Loss:  No significant muscle mass loss    Fluid Accumulation:  No significant fluid accumulation     Strength:  Not Performed    Nutrition Assessment:     9/25: Follow up. Stable home EN patient, tolerating feeds well. Sitter bedside reports no concerns. Discussed with nursing; no recent emesis, no BM yet this admission. Last BM 9/22 per chart review. Obtained bedscale weight indicative of mild weight gain since previous admission. Lytes WDL.    TF @ goal volume 900 mL/day provides 1350 kcal (99% needs), 124 g carb, 67  g protein (100% needs). TF + FWF Provides 1350 mL H2O/day.    Wt Readings from Last 3 Encounters:   09/25/24 45.4 kg (100 lb 1.6 oz)   08/08/24 42.6 kg (94 lb)   07/01/24 40.4 kg (89 lb)       Nutrition Related Findings:      Wound Type: None     Last BM:    Edema: None                    Nutr. Labs:    Lab Results   Component Value Date    CREATININE 0.52 (L) 09/25/2024    BUN 14 09/25/2024     09/25/2024    K 3.5 09/25/2024     09/25/2024    CO2 29 09/25/2024       Lab Results   Component Value Date/Time    POCGLU 117 08/08/2024 07:47 AM    POCGLU 103 06/27/2024 03:56 PM    POCGLU 82 06/23/2024 10:39 PM    POCGLU 90 06/23/2024 12:48 AM    POCGLU 78 06/22/2024 11:33 PM    POCGLU 164 06/20/2024 01:27 AM        Hemoglobin A1C   Date Value Ref Range Status   06/26/2024 5.0 
Comprehensive Nutrition Assessment    Type and Reason for Visit:  Reassess    Nutrition Recommendations/Plan:   Continue home feeds:  [employer] Peptide 1.5 at 65 mL x14 hrs/day (6am-8p)   FWF 90 mL at start and every 2 hours during feeding (8 total flushes)      Malnutrition Assessment:  Malnutrition Status:  No malnutrition (09/23/24 1208)    Context:  Acute Illness     Findings of the 6 clinical characteristics of malnutrition:  Energy Intake:  No significant decrease in energy intake  Weight Loss:  No significant weight loss     Body Fat Loss:  No significant body fat loss     Muscle Mass Loss:  No significant muscle mass loss    Fluid Accumulation:  No significant fluid accumulation     Strength:  Not Performed    Nutrition Assessment:     10/1: Follow up. Continues to tolerate home feeds; last BM 2 days ago, receiving daily glycolax. No labs x 3 days. Discussed with nursing; awaiting new facility for discharge, but is medically stable otherwise. Will discuss with caregiver when at bedside.     TF @ goal volume 900 mL/day provides 1350 kcal (99% needs), 124 g carb, 67 g protein (100% needs). TF + FWF Provides 1350 mL H2O/day.     Nutrition Related Findings:      Wound Type: None     Last BM: 09/29/24  Edema: None                    Nutr. Labs:    Lab Results   Component Value Date    CREATININE 0.65 (L) 09/28/2024    BUN 16 09/28/2024     09/28/2024    K 3.8 09/28/2024     09/28/2024    CO2 24 09/28/2024       Lab Results   Component Value Date/Time    POCGLU 99 09/27/2024 01:55 AM    POCGLU 117 08/08/2024 07:47 AM    POCGLU 103 06/27/2024 03:56 PM    POCGLU 82 06/23/2024 10:39 PM    POCGLU 90 06/23/2024 12:48 AM    POCGLU 78 06/22/2024 11:33 PM        Hemoglobin A1C   Date Value Ref Range Status   06/26/2024 5.0 4.0 - 5.6 % Final     Comment:     (NOTE)  HbA1C Interpretive Ranges  <5.7              Normal  5.7 - 6.4         Consider Prediabetes  >6.5              Consider Diabetes         Lab 
Music Therapy Assessment  [facility]    Ayden Simpson [id number]     1965  58 y.o.  male    Patient Telephone Number: [phone] (home)   Ep[employer] Affiliation: Other   Language: English   Patient Active Problem List    Diagnosis Date Noted    Sepsis due to urinary tract infection (HCC) 09/23/2024    Palliative care encounter 09/23/2024    Debility 09/23/2024    Urinary tract infection without hematuria 09/23/2024    DNR (do not resuscitate) discussion 09/23/2024    Aspiration pneumonia ([facility]) 06/28/2024    Severe protein-energy malnutrition ([facility]) 06/19/2024    Severe sepsis ([facility]) 06/12/2024    Iron deficiency anemia 03/25/2023    Long term current use of antipsychotic medication 03/25/2023    Cerebral palsy ([facility]) 05/06/2022    Lives in group home 05/06/2022    Intellectual disability 05/06/2022    Gastrostomy tube dependent ([facility]) 05/06/2022    Dysphagia 05/06/2022    Wheelchair dependence 05/06/2022        Date: 9/24/2024            Total Time Calculated: 25 min          [facility] INTERMEDIATE CARE UNIT    Mental Status:   [  ] Alert [  ] Forgetful [  ]  Confused  [  ] Minimally responsive  [  ] Sleeping - N/A    Communication Status: [  ] Impaired Speech [x] Nonverbal     Physical Status:   [  ] Oxygen in use  [  ] Hard of Hearing [  ] Vision Impaired  [  ] Ambulatory  [  ] Ambulatory with assistance [x] Non-ambulatory     Music Preferences, Background: N/A: Please see Session Observations below.     Clinical Problem addressed: Positive social engagement;     Goal(s) met in session: N/A: Please see Session Observations below.   Physical/Pain management (Scale of 1-10):    Pre-session rating ___________    Post-session rating __________  [  ] Increased relaxation   [  ] Affected breathing patterns  [  ] Decreased muscle tension   [  ] Decreased agitation  [  ] Affected heart rate    [  ] Increased alertness     Emotional/Psychological:  [  ] Increased self-expression   [  ] Decreased aggressive 
Music Therapy Assessment  [facility]    Ayden Simpson [id number]     1965  58 y.o.  male    Patient Telephone Number: [phone] (home)   Worship Affiliation: Other   Language: English   Patient Active Problem List    Diagnosis Date Noted    Sepsis due to urinary tract infection (HCC) 09/23/2024    Palliative care encounter 09/23/2024    Debility 09/23/2024    Urinary tract infection without hematuria 09/23/2024    DNR (do not resuscitate) discussion 09/23/2024    Aspiration pneumonia ([facility]) 06/28/2024    Severe protein-energy malnutrition (HCC) 06/19/2024    Severe sepsis ([facility]) 06/12/2024    Iron deficiency anemia 03/25/2023    Long term current use of antipsychotic medication 03/25/2023    Cerebral palsy ([facility]) 05/06/2022    Lives in group home 05/06/2022    Intellectual disability 05/06/2022    Gastrostomy tube dependent ([facility]) 05/06/2022    Dysphagia 05/06/2022    Wheelchair dependence 05/06/2022        Date: 10/1/2024            Total Time Calculated: 50 min          SF A3 MULTI-SPECIALTY TELEMETRY    Mental Status:   [x] Alert [  ] Forgetful [  ]  Confused  [  ] Minimally responsive  [  ] Sleeping    Communication Status: [x] Impaired Speech - Verbal limitations [  ] Nonverbal     Physical Status:   [  ] Oxygen in use  [  ] Hard of Hearing [  ] Vision Impaired  [  ] Ambulatory  [  ] Ambulatory with assistance [X] Non-ambulatory     Music Preferences, Background: Per pt's caregiver (Alejandra), pt enjoys all kinds of music and values 1[employer] company    Clinical Problem addressed: Positive social interaction; enhanced communication     Goal(s) met in session:  Physical/Pain management (Scale of 1-10):    Pre-session rating: No pain indicators present  Post-session rating: No pain indicators present   [  ] Increased relaxation   [  ] Affected breathing patterns  [  ] Decreased muscle tension   [  ] Decreased agitation  [  ] Affected heart rate    [  ] Increased 
RITO VALENZUELA [facility]  [address] [place] [address] [phone]      Medical Progress Note      NAME: Ayden Simpson   :  1965  MRM:  [id number]    Date/Time: 10/1/2024  11:57 AM           Assessment / Plan:   59 yo hx of cerebral palsy, malnutrition s/p PEG, PFO, presented w/ sepsis, aspiration PNA, UTI/prostatitis, kidney stone.     Complicated UTI/prostatitis due to L ureteral stone  Sepsis POA, now resolved.    Enlarged prostrate  Evaluation revealed 9 mm calculus in the left renal pelvis; no hydronephrosis noted.  Urine cultures noted to be growing both Klebsiella and Enterococcus, pansensitive.    Evaluated by urology with no plans for surgical intervention.  Plans to treat with antibiotics x 6 weeks, with last dose of levofloxacin 2024: Will switch to oral levofloxacin when oral intake is dependable.  Given enlarged prostrate, will defer to urology as an outpatient on whether biopsy is indicated to rule out malignancy.  Medically stable for discharge: Case management working on complex disposition plans as patient cannot return to original group home.    Cerebral palsy and lives in a group home  Continue with baclofen, risperidone, Remeron and frequent turns.  As needed sitter at bedside.    Aspiration pneumonitis, recurrent  Severe protein caloric malnutrition with PEG J-tube in place on   Remains high risk for aspiration.  Continue to monitor.  Continue J-tube feedings with appropriate precautions to prevent aspiration.  G-tube malfunction adjusted with bumper tightened by GI on .    Hypokalemia  Hypophosphatemia  Hypomagnesemia  Appropriate repletion and tube feedings to continue.  Continue to closely monitor and replete accordingly.       Incidental PFO on echocardiogram  Outpatient follow-up with cardiology.     POA/guardian: Alejandra Hicks.  Caretaker is Mitzi Felicity.                Care Plan discussed with: Care delivery team.    Prophylaxis:  
RITO VALENZUELA [facility]  [address] [place] [address] [phone]      Medical Progress Note      NAME: Ayden Simpson   :  1965  MRM:  [id number]    Date/Time: 2024  1:31 PM           Assessment / Plan:   57 yo hx of cerebral palsy, malnutrition s/p PEG, PFO, presented w/ sepsis, aspiration PNA, UTI/prostatitis, kidney stone.     Complicated UTI/prostatitis due to L ureteral stone  Sepsis POA, now resolved.    Enlarged prostrate  Evaluation revealed 9 mm calculus in the left renal pelvis; no hydronephrosis noted.  Urine cultures noted to be growing both Klebsiella and Enterococcus, pansensitive.    Evaluated by urology with no plans for surgical intervention.  Plans to treat with antibiotics x 6 weeks, with last dose of levofloxacin 2024: Will switch to oral levofloxacin when oral intake is dependable.  Given enlarged prostrate, will defer to urology as an outpatient on whether biopsy is indicated to rule out malignancy.  Medically stable for discharge: Case management working on complex disposition plans as patient cannot return to original group home.    Cerebral palsy and lives in a group home  Continue with baclofen, risperidone, Remeron and frequent turns.  As needed sitter at bedside.    Aspiration pneumonitis, recurrent  Severe protein caloric malnutrition with PEG J-tube in place on   Remains high risk for aspiration.  Continue to monitor.  Continue J-tube feedings with appropriate precautions to prevent aspiration.  G-tube malfunction adjusted with bumper tightened by GI on .    Hypokalemia  Hypophosphatemia  Hypomagnesemia  Appropriate repletion and tube feedings to continue.  Continue to closely monitor and replete accordingly.       Incidental PFO on echocardiogram  Outpatient follow-up with cardiology.     POA/guardian: Alejandra Hicks.  Caretaker is Mitzi Felicity.                Care Plan discussed with: Care delivery team.    Prophylaxis:  
[facility]ITO VALENZUEL[facility]  [address] [place] [address] [phone]        Hospitalist Progress Note      NAME: Ayden Simpson   :  1965  MRM:  [id number]    Date/Time of service: 2024  12:18 PM       Subjective:     Chief Complaint:  Patient was personally seen and examined by me during this time period.  Chart reviewed.  No fevers.  Updated caretaker, Mitzi.  Patient is in the process of switching group home.  Mitzi is working on nursing care for new group home        Objective:       Vitals:       Last 24hrs VS reviewed since prior progress note. Most recent are:    Vitals:    24 1138   BP: (!) 88/64   Pulse: 81   Resp: 16   Temp: 97.9 °F (36.6 °C)   SpO2: 94%     SpO2 Readings from Last 6 Encounters:   24 94%   24 99%   24 96%   24 95%   24 94%   24 97%          Intake/Output Summary (Last 24 hours) at 2024 1218  Last data filed at 2024 1138  Gross per 24 hour   Intake 100 ml   Output 1250 ml   Net -1150 ml        Exam:     Physical Exam:    Gen:  frail, ill-appearing, NAD  HEENT:  Pink conjunctivae, PERRL, hearing intact to voice, dry mucous membranes  Neck:  Supple, without masses, thyroid non-tender  Resp:  No accessory muscle use, some rhonchi at bases  Card:  No murmurs, normal S1, S2 without thrills, bruits or peripheral edema  Abd:  Soft, non-tender, non-distended, normoactive bowel sounds are present, has J-tube  Musc:  contracted   Skin:  No rashes  Neuro:  moves all ext  Psych:  no insight, awake     Medications Reviewed: (see below)    Lab Data Reviewed: (see below)    ______________________________________________________________________    Medications:     Current Facility-Administered Medications   Medication Dose Route Frequency    levoFLOXacin (LEVAQUIN) tablet 500 mg  500 mg Per G Tube Daily    sodium chloride flush 0.9 % injection 5-40 mL  5-40 mL IntraVENous 2 times per day    sodium chloride flush 
[facility]O VALEN[facility]UEL[facility]  [address] [place] [address] [phone]        Hospitalist Progress Note      NAME: Ayden Simpson   :  1965  MRM:  [id number]    Date/Time of service: 2024  12:28 PM       Subjective:     Chief Complaint:  Patient was personally seen and examined by me during this time period.  Chart reviewed.  No fevers.  No events.  Spoke to caretaker, Mitzi        Objective:       Vitals:       Last 24hrs VS reviewed since prior progress note. Most recent are:    Vitals:    24 0848   BP: (!) 89/60   Pulse: 97   Resp: 22   Temp: 98.2 °F (36.8 °C)   SpO2: 97%     SpO2 Readings from Last 6 Encounters:   24 97%   24 99%   24 96%   24 95%   24 94%   24 97%          Intake/Output Summary (Last 24 hours) at 2024 1228  Last data filed at 2024 0604  Gross per 24 hour   Intake 1235 ml   Output 300 ml   Net 935 ml        Exam:     Physical Exam:    Gen:  frail, ill-appearing, NAD  HEENT:  Pink conjunctivae, PERRL, hearing intact to voice, dry mucous membranes  Neck:  Supple, without masses, thyroid non-tender  Resp:  No accessory muscle use, some rhonchi at bases  Card:  No murmurs, normal S1, S2 without thrills, bruits or peripheral edema  Abd:  Soft, non-tender, non-distended, normoactive bowel sounds are present, has J-tube  Musc:  contracted   Skin:  No rashes  Neuro:  moves all ext  Psych:  no insight, awake     Medications Reviewed: (see below)    Lab Data Reviewed: (see below)    ______________________________________________________________________    Medications:     Current Facility-Administered Medications   Medication Dose Route Frequency    meropenem (MERREM) 1,000 mg in sodium chloride 0.9 % 100 mL IVPB (mini-bag)  1,000 mg IntraVENous Q8H    sodium chloride flush 0.9 % injection 5-40 mL  5-40 mL IntraVENous 2 times per day    sodium chloride flush 0.9 % injection 5-40 mL  5-40 mL IntraVENous PRN    0.9 
[facility] VALEN[facility]  [address] [place] [address] [phone]      Medical Progress Note      NAME: Ayden Simpson   :  1965  MRM:  [id number]    Date/Time: 10/2/2024  9:23 AM           Assessment / Plan:   59 yo hx of cerebral palsy, malnutrition s/p PEG, PFO, presented w/ sepsis, aspiration PNA, UTI/prostatitis, kidney stone.     Complicated UTI/prostatitis due to L ureteral stone  Sepsis POA, now resolved.    Enlarged prostrate  Evaluation revealed 9 mm calculus in the left renal pelvis; no hydronephrosis noted.  Urine cultures noted to be growing both Klebsiella and Enterococcus, pansensitive.    Evaluated by urology with no plans for surgical intervention.  Plans to treat with antibiotics x 6 weeks, with last dose of levofloxacin 2024: Will switch to oral levofloxacin when oral intake is dependable.  Given enlarged prostrate, will defer to urology as an outpatient on whether biopsy is indicated to rule out malignancy.  Medically stable for discharge: Case management working on complex disposition plans as patient cannot return to original group home.    Cerebral palsy and lives in a group home  Continue with baclofen, risperidone, Remeron and frequent turns.  As needed sitter at bedside.    Aspiration pneumonitis, recurrent  Severe protein caloric malnutrition with PEG J-tube in place on   Remains high risk for aspiration.  Continue to monitor.  Continue J-tube feedings with appropriate precautions to prevent aspiration.  G-tube malfunction adjusted with bumper tightened by GI on .    Hypokalemia  Hypophosphatemia  Hypomagnesemia  Appropriate repletion and tube feedings to continue.  Continue to closely monitor and replete accordingly.       Incidental PFO on echocardiogram  Outpatient follow-up with cardiology.     POA/guardian: Alejandra Hicks.  Caretaker is Mitzi Felicity.                Care Plan discussed with: Care delivery team.    Prophylaxis:  
RITO VALENZUEL[facility]  [address] [place] [address] [phone]      Medical Progress Note      NAME: Ayden Simpson   :  1965  MRM:  [id number]    Date/Time: 2024  12:50 PM           Assessment / Plan:   59 yo hx of cerebral palsy, malnutrition s/p PEG, PFO, presented w/ sepsis, aspiration PNA, UTI/prostatitis, kidney stone.     Complicated UTI/prostatitis due to L ureteral stone  Sepsis POA, now resolved.    Enlarged prostrate  Evaluation revealed 9 mm calculus in the left renal pelvis; no hydronephrosis noted.  Urine cultures noted to be growing both Klebsiella and Enterococcus, pansensitive.    Evaluated by urology with no plans for surgical intervention.  Plans to treat with antibiotics x 6 weeks, with last dose of levofloxacin 2024: Will switch to oral levofloxacin when oral intake is dependable.  Given enlarged prostrate, will defer to urology as an outpatient on whether biopsy is indicated to rule out malignancy.  Medically stable for discharge: Case management working on complex disposition plans.    Cerebral palsy and lives in a group home  Continue with baclofen, risperidone, Remeron and frequent turns.  Current sitter at bedside.    Aspiration pneumonitis, recurrent  Severe protein caloric malnutrition with PEG J-tube in place on   Remains high risk for aspiration.  Continue to monitor.  Continue J-tube feedings with appropriate precautions to prevent aspiration.  G-tube malfunction adjusted with bumper tightened by GI on .    Hypokalemia  Hypophosphatemia  Hypomagnesemia  Appropriate repletion and tube feedings to continue.  Continue to closely monitor and replete accordingly.       Incidental PFO on echocardiogram  Outpatient follow-up with cardiology.     POA/guardian: Alejandra Hicks.  Caretaker is Mitzi Blevins                Care Plan discussed with:  Sitter at bedside.    Prophylaxis:  Lovenox    Disposition: Pending.  Case management 
[facility]EN[facility]UEL[facility]  [address] [place] [address] [phone]      Medical Progress Note      NAME: Ayden Simpson   :  1965  MRM:  [id number]    Date/Time: 10/3/2024  10:06 AM           Assessment / Plan:   59 yo hx of cerebral palsy, malnutrition s/p PEG, PFO, presented w/ sepsis, aspiration PNA, UTI/prostatitis, kidney stone.     Complicated UTI/prostatitis due to L ureteral stone  Sepsis POA, now resolved.    Enlarged prostrate  Evaluation revealed 9 mm calculus in the left renal pelvis; no hydronephrosis noted.  Urine cultures noted to be growing both Klebsiella and Enterococcus, pansensitive.    Evaluated by urology with no plans for surgical intervention.  Plans to treat with antibiotics x 6 weeks, with last dose of levofloxacin 2024: Will switch to oral levofloxacin when oral intake is dependable.  Given enlarged prostrate, will defer to urology as an outpatient on whether biopsy is indicated to rule out malignancy.  Medically stable for discharge: Case management working on complex disposition plans as patient cannot return to original group home.    Cerebral palsy and lives in a group home  Continue with baclofen, risperidone, Remeron and frequent turns.  As needed sitter at bedside.    Aspiration pneumonitis, recurrent  Severe protein caloric malnutrition with PEG J-tube in place on   Remains high risk for aspiration.  Continue to monitor.  Continue J-tube feedings with appropriate precautions to prevent aspiration.  G-tube malfunction adjusted with bumper tightened by GI on .    Hypokalemia  Hypophosphatemia  Hypomagnesemia  Appropriate repletion and tube feedings to continue.  Continue to closely monitor and replete accordingly.       Incidental PFO on echocardiogram  Outpatient follow-up with cardiology.     POA/guardian: Alejandra Hicks.  Caretaker is Mitzi Felicity.                Care Plan discussed with: Care delivery team.    Prophylaxis:  
Report given to Mitzi Blevins discharge caregiver. Opportunity for questions or concerns to be answered at time of call. Mitzi Blevins verbalized understanding.   
Spiritual Health History and Assessment/Progress Note  [facility]    Initial Encounter,  ,  ,      Name: Ayden Simpson MRN: [id number]    Age: 58 y.o.     Sex: male   Language: English   Buddhism: Other   Sepsis due to urinary tract infection (HCC)     Date: 9/23/2024            Total Time Calculated: 10 min              Spiritual Assessment began in [facility] B3 INTERMEDIATE CARE UNIT        Referral/Consult From: Hedy Sanchez   Encounter Overview/Reason: Initial Encounter  Service Provided For: Patient    Ginette, Belief, Meaning:   Patient unable to assess at this time  Family/Friends No family/friends present      Importance and Influence:  Patient unable to assess at this time  Family/Friends No family/friends present    Community:  Patient Unknown  Family/Friends No family/friends present    Assessment and Plan of Care:   Patient Interventions include: Ministry of presence.   Family/Friends Interventions include: No family/friends present    Patient Plan of Care: Spiritual Care available upon further referral  Family/Friends Plan of Care: No family/friends present    Chart review. Rounded on ED floor. Met and tried to converse with patient. Patient did not respond verbally to visitation. No caretakers are present in the room. No family present in the room. No friends present in the room. Provided prayers for the patient. Patient gave eye contact when praying for him. Please contact spiritual health services for further assistance needed.    Electronically signed by Chaplain Hawa on 9/23/2024 at 2:52 PM   
PRN    0.9 % sodium chloride infusion   IntraVENous PRN    enoxaparin Sodium (LOVENOX) injection 30 mg  30 mg SubCUTAneous Daily    polyethylene glycol (GLYCOLAX) packet 17 g  17 g Oral Daily PRN    acetaminophen (TYLENOL) tablet 650 mg  650 mg Per J Tube Q6H PRN    Or    acetaminophen (TYLENOL) suppository 650 mg  650 mg Rectal Q6H PRN    ondansetron (ZOFRAN-ODT) disintegrating tablet 4 mg  4 mg Per J Tube Q8H PRN    Or    ondansetron (ZOFRAN) injection 4 mg  4 mg IntraVENous Q6H PRN    baclofen (LIORESAL) tablet 10 mg  10 mg Per G Tube TID    [Held by provider] ferrous Sulfate 300 (60 Fe) MG/5ML solution 300 mg  300 mg Per G Tube Daily    bisacodyl (DULCOLAX) suppository 10 mg  10 mg Rectal Daily PRN    glycopyrrolate (ROBINUL) tablet 1 mg  1 mg Per G Tube Daily    guaiFENesin (ROBITUSSIN) 100 MG/5ML liquid 200 mg  200 mg Per G Tube 4x Daily PRN    ipratropium 0.5 mg-albuterol 2.5 mg (DUONEB) nebulizer solution 1 Dose  1 Dose Inhalation Q4H PRN    cetirizine (ZYRTEC) tablet 5 mg  5 mg Per G Tube Daily    mirtazapine (REMERON) tablet 7.5 mg  7.5 mg Per G Tube Nightly    midodrine (PROAMATINE) tablet 5 mg  5 mg PEG Tube TID WC    polyethylene glycol (GLYCOLAX) packet 8.5 g  8.5 g Per G Tube Daily    risperiDONE (RisperDAL) 1 MG/ML oral solution 2 mg  2 mg Per G Tube BID    pantoprazole (PROTONIX) 40 mg in sodium chloride (PF) 0.9 % 10 mL injection  40 mg IntraVENous Daily          Lab Review:     Recent Labs     09/25/24  0353 09/26/24  0807   WBC 12.0* 5.3   HGB 14.0 15.1   HCT 41.8 46.2    190     Recent Labs     09/25/24  0224 09/25/24  0353 09/26/24  0807    139 137   K Hemolyzed, Recollection Recommended 3.5 4.5    105 107   CO2 26 29 24   BUN 15 14 11   MG Hemolyzed, Recollection Recommended 2.0 2.2   PHOS 3.4 3.2 3.3     No results found for: \"GLUCPOC\"       Assessment / Plan:     57 yo hx of cerebral palsy, malnutrition s/p PEG, PFO, presented w/ sepsis, aspiration PNA, UTI/prostatitis, 
infusion   IntraVENous PRN    enoxaparin Sodium (LOVENOX) injection 30 mg  30 mg SubCUTAneous Daily    polyethylene glycol (GLYCOLAX) packet 17 g  17 g Oral Daily PRN    acetaminophen (TYLENOL) tablet 650 mg  650 mg Per J Tube Q6H PRN    Or    acetaminophen (TYLENOL) suppository 650 mg  650 mg Rectal Q6H PRN    ondansetron (ZOFRAN-ODT) disintegrating tablet 4 mg  4 mg Per J Tube Q8H PRN    Or    ondansetron (ZOFRAN) injection 4 mg  4 mg IntraVENous Q6H PRN    baclofen (LIORESAL) tablet 10 mg  10 mg Per G Tube TID    [Held by provider] ferrous Sulfate 300 (60 Fe) MG/5ML solution 300 mg  300 mg Per G Tube Daily    bisacodyl (DULCOLAX) suppository 10 mg  10 mg Rectal Daily PRN    glycopyrrolate (ROBINUL) tablet 1 mg  1 mg Per G Tube Daily    guaiFENesin (ROBITUSSIN) 100 MG/5ML liquid 200 mg  200 mg Per G Tube 4x Daily PRN    ipratropium 0.5 mg-albuterol 2.5 mg (DUONEB) nebulizer solution 1 Dose  1 Dose Inhalation Q4H PRN    cetirizine (ZYRTEC) tablet 5 mg  5 mg Per G Tube Daily    mirtazapine (REMERON) tablet 7.5 mg  7.5 mg Per G Tube Nightly    midodrine (PROAMATINE) tablet 5 mg  5 mg PEG Tube TID WC    polyethylene glycol (GLYCOLAX) packet 8.5 g  8.5 g Per G Tube Daily    risperiDONE (RisperDAL) 1 MG/ML oral solution 2 mg  2 mg Per G Tube BID    pantoprazole (PROTONIX) 40 mg in sodium chloride (PF) 0.9 % 10 mL injection  40 mg IntraVENous Daily          Lab Review:     Recent Labs     09/22/24 2114 09/24/24  0533   WBC 18.4* 21.7*   HGB 17.5* 14.1   HCT 53.1* 42.9    212     Recent Labs     09/22/24 2114 09/24/24  0533    142   K 4.1 4.4    110*   CO2 24 25   BUN 17 10   MG  --  2.0   PHOS  --  3.2   ALT 18 22     No results found for: \"GLUCPOC\"       Assessment / Plan:     59 yo hx of cerebral palsy, malnutrition s/p PEG, PFO, presented w/ sepsis, aspiration PNA, UTI/prostatitis, kidney stone     1) Complicated UTI/prostatitis due to L ureteral stone: sepsis POA, slowly improving.  Will monitor 
planning.           ___________________________________________________    Attending Physician: Cristopher Toro MD        Subjective:     Chief Complaint: Appears awake in bed.  Does not respond to my interactions.  Poor historian.  Sitter at bedside.       Objective:       Vitals:     Last 24hrs VS reviewed since prior progress note. Most recent are:    Vitals:    09/28/24 1105   BP: (!) 89/71   Pulse: 78   Resp: 17   Temp: 98.4 °F (36.9 °C)   SpO2: 96%     SpO2 Readings from Last 6 Encounters:   09/28/24 96%   08/12/24 99%   07/02/24 96%   06/30/24 95%   06/29/24 94%   06/28/24 97%          Intake/Output Summary (Last 24 hours) at 9/28/2024 1459  Last data filed at 9/28/2024 1156  Gross per 24 hour   Intake --   Output 600 ml   Net -600 ml          Exam:     Physical Exam:    Gen: Chronically ill-appearing.  Coiled up in bed.  HEENT:  NC/AT.   Resp:  Unlabored breathing.   Card: Normal S1 and S2.   Abd:  Soft, non-tender, non-distended, normoactive bowel sounds.  Neuro: Limited examination.  Psych: Unobtainable.    Medications Reviewed: (see below)    Lab Data Reviewed: (see below)  ______________________________________________________________________    Medications:     Current Facility-Administered Medications   Medication Dose Route Frequency    levoFLOXacin (LEVAQUIN) 500 MG/100ML infusion 500 mg  500 mg IntraVENous Q24H    sodium chloride flush 0.9 % injection 5-40 mL  5-40 mL IntraVENous 2 times per day    sodium chloride flush 0.9 % injection 5-40 mL  5-40 mL IntraVENous PRN    0.9 % sodium chloride infusion   IntraVENous PRN    enoxaparin Sodium (LOVENOX) injection 30 mg  30 mg SubCUTAneous Daily    polyethylene glycol (GLYCOLAX) packet 17 g  17 g Oral Daily PRN    acetaminophen (TYLENOL) tablet 650 mg  650 mg Per J Tube Q6H PRN    Or    acetaminophen (TYLENOL) suppository 650 mg  650 mg Rectal Q6H PRN    ondansetron (ZOFRAN-ODT) disintegrating tablet 4 mg  4 mg Per J Tube Q8H PRN    Or    ondansetron 
Collection Time: 09/25/24  3:53 AM   Result Value Ref Range    Phosphorus 3.2 2.6 - 4.7 MG/DL   Magnesium    Collection Time: 09/25/24  3:53 AM   Result Value Ref Range    Magnesium 2.0 1.6 - 2.4 mg/dL       Imaging:  CT ABDOMEN PELVIS W IV CONTRAST Additional Contrast? Radiologist Recommendation    Result Date: 9/23/2024  EXAM: CT ABDOMEN PELVIS W IV CONTRAST INDICATION: febrile UTI, rule out ureteral obstruction, prostatic or renal abscess COMPARISON: CT abdomen pelvis August 8, 2024 CONTRAST: 80 mL of Isovue-370. ORAL CONTRAST: None TECHNIQUE: Following intravenous administration of contrast, thin axial images were obtained through the abdomen and pelvis. Coronal and sagittal reconstructions were generated. CT dose reduction was achieved through use of a standardized protocol tailored for this examination and automatic exposure control for dose modulation. FINDINGS: LOWER THORAX: No significant abnormality in the incidentally imaged lower chest. LIVER: Right hepatic cyst. BILIARY TREE: Gallbladder is within normal limits. CBD is not dilated. SPLEEN: within normal limits. PANCREAS: No mass or ductal dilatation. ADRENALS: Unremarkable. KIDNEYS: Multiple renal cysts bilaterally. There is a 9 mm calculus in the left renal pelvis without hydronephrosis. There is mild adjacent urothelial enhancement. STOMACH: Percutaneous gastrojejunostomy tube. SMALL BOWEL: No dilatation or wall thickening. COLON: No dilatation or wall thickening. APPENDIX: Unremarkable. PERITONEUM: No ascites or pneumoperitoneum. RETROPERITONEUM: No lymphadenopathy or aortic aneurysm. REPRODUCTIVE ORGANS: Irregular asymmetric prostatomegaly. URINARY BLADDER: Multiple dependent densities in the bladder compatible with calculi. Diffuse irregular bladder wall thickening. BONES: No destructive bone lesion. ABDOMINAL WALL: No mass or hernia. ADDITIONAL COMMENTS: N/A     1. Diffuse irregular bladder wall thickening, with multiple bladder calculi. 2. 9 mm 
suppository 650 mg  650 mg Rectal Q6H PRN    ondansetron (ZOFRAN-ODT) disintegrating tablet 4 mg  4 mg Per J Tube Q8H PRN    Or    ondansetron (ZOFRAN) injection 4 mg  4 mg IntraVENous Q6H PRN    piperacillin-tazobactam (ZOSYN) 3,375 mg in sodium chloride 0.9 % 50 mL IVPB (mini-bag)  3,375 mg IntraVENous q8h    baclofen (LIORESAL) tablet 10 mg  10 mg Per G Tube TID    [Held by provider] ferrous Sulfate 300 (60 Fe) MG/5ML solution 300 mg  300 mg Per G Tube Daily    bisacodyl (DULCOLAX) suppository 10 mg  10 mg Rectal Daily PRN    glycopyrrolate (ROBINUL) tablet 1 mg  1 mg Per G Tube Daily    guaiFENesin (ROBITUSSIN) 100 MG/5ML liquid 200 mg  200 mg Per G Tube 4x Daily PRN    ipratropium 0.5 mg-albuterol 2.5 mg (DUONEB) nebulizer solution 1 Dose  1 Dose Inhalation Q4H PRN    cetirizine (ZYRTEC) tablet 5 mg  5 mg Per G Tube Daily    mirtazapine (REMERON) tablet 7.5 mg  7.5 mg Per G Tube Nightly    midodrine (PROAMATINE) tablet 5 mg  5 mg PEG Tube TID WC    polyethylene glycol (GLYCOLAX) packet 8.5 g  8.5 g Per G Tube Daily    risperiDONE (RisperDAL) 1 MG/ML oral solution 2 mg  2 mg Per G Tube BID    pantoprazole (PROTONIX) 40 mg in sodium chloride (PF) 0.9 % 10 mL injection  40 mg IntraVENous Daily     Current Outpatient Medications   Medication Sig    finasteride (PROSCAR) 5 MG tablet 1 tablet by Per J Tube route daily    midodrine (PROAMATINE) 5 MG tablet 1 tablet by PEG Tube route 3 times daily (with meals) hold if blood pressure <100/60    ferrous sulfate 220 (44 Fe) MG/5ML solution 7 mLs by Per G Tube route daily 7ml= 300mg    loratadine (CLARITIN) 5 MG/5ML solution 10 mLs by Per G Tube route daily    polyethylene glycol (GLYCOLAX) 17 GM/SCOOP powder 8 g by Per G Tube route daily    acetaminophen (TYLENOL) 160 MG/5ML suspension 20 mLs by Per G Tube route in the morning and 20 mLs in the evening.    bisacodyl (DULCOLAX) 10 MG suppository Place 1 suppository rectally daily as needed for Constipation    guaiFENesin 
GLUCOSE 91 07/21/2023 09:39 AM     No results for input(s): \"PH\", \"PCO2\", \"PO2\", \"HCO3\", \"FIO2\" in the last 72 hours.  No results for input(s): \"INR\" in the last 72 hours.  Results       ** No results found for the last 336 hours. **            Other pertinent lab: none    Total time spent with patient: 45 Minutes I personally reviewed chart, notes, data and current medications in the medical record.  I have personally examined and treated the patient at bedside during this period.                  Care Plan discussed with: Patient, Care Manager, Nursing Staff, Consultant/Specialist, and >50% of time spent in counseling and coordination of care    Discussed:  Care Plan and D/C Planning    Prophylaxis:  Lovenox and H2B/PPI    Disposition:  Home w/Family           ___________________________________________________    Attending Physician: Santino Estevez MD      
    tetracycline <=1 ug/mL Sensitive      vancomycin 1 ug/mL Sensitive                           COVID-19 & Influenza Combo [[id number]] Collected: 09/22/24 2115    Order Status: Completed Specimen: Nasopharyngeal Updated: 09/22/24 2142     Source Nasopharyngeal        SARS-CoV-2, PCR Not detected        Comment: Not Detected results do not preclude SARS-CoV-2 infection and should not be used as the sole basis for patient management decisions.Results must be combined with clinical observations, patient history, and epidemiological information.        Rapid Influenza A By PCR Not detected        Rapid Influenza B By PCR Not detected        Comment: Testing was performed using [employer] SARS-CoV-2 and Influenza A/B nucleic acid assay.  This test is a multiplex Real-Time Reverse Transcriptase Polymerase Chain Reaction (RT-PCR) based in vitro diagnostic test intended for the qualitative detection of nucleic acids from SARS-CoV-2, Influenza A, and Influenza B in nasopharyngeal and nasal swab specimens for use under the FDA's Emergency Use Authorization (EUA) only.     Fact sheet for Patients: https://www.fda.gov/media/344710/download  Fact sheet for Healthcare Providers: https://www.fda.fov/media/734229/download                 Other pertinent lab: none    Total time spent with patient: 45 Minutes I personally reviewed chart, notes, data and current medications in the medical record.  I have personally examined and treated the patient at bedside during this period.                  Care Plan discussed with: Patient, Care Manager, Nursing Staff, Consultant/Specialist, and >50% of time spent in counseling and coordination of care    Discussed:  Care Plan and D/C Planning    Prophylaxis:  Lovenox and H2B/PPI    Disposition:  Home w/Family           ___________________________________________________    Attending Physician: Santino Estevez MD

## 2024-10-07 NOTE — DISCHARGE SUMMARY
10 mL, Refills: 0    Associated Diagnoses: Intellectual disability      mirtazapine (REMERON SOL-TAB) 15 MG disintegrating tablet 0.5 tablets by Per G Tube route nightly for 10 days  Qty: 5 tablet, Refills: 0      risperiDONE (RISPERDAL) 1 MG/ML oral solution Take 2 mLs by mouth in the morning and at bedtime for 10 days GIVE 1ML BY G-TUBE 3 TIMES A DAY FOR ANXIETY, LASHING OUT, KICKING, THROWING SELF OUT OF WHEELCHAIR AGGRESSIVELY, OR YELLING.  Strength: 1 mg/mL  Qty: 40 mL, Refills: 0      baclofen (LIORESAL) 10 MG tablet Take 1 tablet by mouth 3 times daily for 10 days  Qty: 30 tablet, Refills: 0           CONTINUE these medications which have NOT CHANGED    Details   finasteride (PROSCAR) 5 MG tablet 1 tablet by Per J Tube route daily      midodrine (PROAMATINE) 5 MG tablet 1 tablet by PEG Tube route 3 times daily (with meals) hold if blood pressure <100/60  Qty: 90 tablet, Refills: 0      ferrous sulfate 220 (44 Fe) MG/5ML solution 7 mLs by Per G Tube route daily 7ml= 300mg      loratadine (CLARITIN) 5 MG/5ML solution 10 mLs by Per G Tube route daily      polyethylene glycol (GLYCOLAX) 17 GM/SCOOP powder 8 g by Per G Tube route daily      acetaminophen (TYLENOL) 160 MG/5ML suspension 20 mLs by Per G Tube route in the morning and 20 mLs in the evening.      bisacodyl (DULCOLAX) 10 MG suppository Place 1 suppository rectally daily as needed for Constipation      guaiFENesin (ROBITUSSIN) 100 MG/5ML liquid 10 mLs by Per G Tube route 4 times daily as needed for Congestion      ipratropium 0.5 mg-albuterol 2.5 mg (DUONEB) 0.5-2.5 (3) MG/3ML SOLN nebulizer solution Inhale 3 mLs into the lungs every 4 hours as needed for Shortness of Breath or Wheezing  Qty: 360 mL, Refills: 1      glycopyrrolate (CUVPOSA) 1 MG/5ML SOLN solution GIVE 5ML (1MG) VIA PEG TUBE EVERY DAY FOR EXCESSIVE SALIVA ** REORDER WHEN LOW **  Qty: 150 mL, Refills: 5      vitamin D (CHOLECALCIFEROL) 50 MCG (2000 UT) TABS tablet TAKE ONE TABLET CRUSHED

## 2024-10-31 ENCOUNTER — TRANSCRIBE ORDERS (OUTPATIENT)
Facility: HOSPITAL | Age: 59
End: 2024-10-31

## 2024-10-31 ENCOUNTER — HOSPITAL ENCOUNTER (OUTPATIENT)
Facility: HOSPITAL | Age: 59
Discharge: HOME | End: 2024-11-03
Payer: MEDICAID

## 2024-10-31 DIAGNOSIS — N20.0 CALCULUS OF KIDNEY: ICD-10-CM

## 2024-10-31 DIAGNOSIS — N20.0 CALCULUS OF KIDNEY: Primary | ICD-10-CM

## 2024-10-31 PROCEDURE — 74018 RADEX ABDOMEN 1 VIEW: CPT

## 2025-08-08 ENCOUNTER — HOSPITAL ENCOUNTER (EMERGENCY)
Facility: HOSPITAL | Age: 60
Discharge: HOME | End: 2025-08-09
Attending: EMERGENCY MEDICINE
Payer: MEDICAID

## 2025-08-08 ENCOUNTER — APPOINTMENT (OUTPATIENT)
Facility: HOSPITAL | Age: 60
End: 2025-08-08
Payer: MEDICAID

## 2025-08-08 DIAGNOSIS — N30.00 ACUTE CYSTITIS WITHOUT HEMATURIA: Primary | ICD-10-CM

## 2025-08-08 LAB
ALBUMIN SERPL-MCNC: 4.2 G/DL (ref 3.5–5.2)
ALBUMIN/GLOB SERPL: 1.4 (ref 1.1–2.2)
ALP SERPL-CCNC: 70 U/L (ref 40–129)
ALT SERPL-CCNC: 23 U/L (ref 10–50)
ANION GAP SERPL CALC-SCNC: 13 MMOL/L (ref 2–14)
AST SERPL-CCNC: 26 U/L (ref 10–50)
BASOPHILS # BLD: 0.06 K/UL (ref 0–0.1)
BASOPHILS NFR BLD: 0.4 % (ref 0–1)
BILIRUB SERPL-MCNC: 0.3 MG/DL (ref 0–1.2)
BUN SERPL-MCNC: 17 MG/DL (ref 6–20)
BUN/CREAT SERPL: 19 (ref 12–20)
CALCIUM SERPL-MCNC: 9 MG/DL (ref 8.6–10)
CHLORIDE SERPL-SCNC: 105 MMOL/L (ref 98–107)
CO2 SERPL-SCNC: 24 MMOL/L (ref 20–29)
CREAT SERPL-MCNC: 0.87 MG/DL (ref 0.7–1.2)
DIFFERENTIAL METHOD BLD: ABNORMAL
EOSINOPHIL # BLD: 0.12 K/UL (ref 0–0.4)
EOSINOPHIL NFR BLD: 0.8 % (ref 0–7)
ERYTHROCYTE [DISTWIDTH] IN BLOOD BY AUTOMATED COUNT: 12.9 % (ref 11.5–14.5)
FLUAV RNA SPEC QL NAA+PROBE: NOT DETECTED
FLUBV RNA SPEC QL NAA+PROBE: NOT DETECTED
GLOBULIN SER CALC-MCNC: 3 G/DL (ref 2–4)
GLUCOSE BLD STRIP.AUTO-MCNC: 130 MG/DL (ref 65–117)
GLUCOSE SERPL-MCNC: 131 MG/DL (ref 65–100)
HCT VFR BLD AUTO: 49.5 % (ref 36.6–50.3)
HGB BLD-MCNC: 17.2 G/DL (ref 12.1–17)
IMM GRANULOCYTES # BLD AUTO: 0.11 K/UL (ref 0–0.04)
IMM GRANULOCYTES NFR BLD AUTO: 0.7 % (ref 0–0.5)
LACTATE BLD-SCNC: 1.23 MMOL/L (ref 0.4–2)
LYMPHOCYTES # BLD: 0.47 K/UL (ref 0.8–3.5)
LYMPHOCYTES NFR BLD: 3.1 % (ref 12–49)
MCH RBC QN AUTO: 32.9 PG (ref 26–34)
MCHC RBC AUTO-ENTMCNC: 34.7 G/DL (ref 30–36.5)
MCV RBC AUTO: 94.6 FL (ref 80–99)
MONOCYTES # BLD: 0.97 K/UL (ref 0–1)
MONOCYTES NFR BLD: 6.4 % (ref 5–13)
NEUTS SEG # BLD: 13.37 K/UL (ref 1.8–8)
NEUTS SEG NFR BLD: 88.6 % (ref 32–75)
NRBC # BLD: 0 K/UL (ref 0–0.01)
NRBC BLD-RTO: 0 PER 100 WBC
PLATELET # BLD AUTO: 260 K/UL (ref 150–400)
PMV BLD AUTO: 9.9 FL (ref 8.9–12.9)
POTASSIUM SERPL-SCNC: 4.1 MMOL/L (ref 3.5–5.1)
PROCALCITONIN SERPL-MCNC: 0.07 NG/ML
PROT SERPL-MCNC: 7.1 G/DL (ref 6.4–8.3)
RBC # BLD AUTO: 5.23 M/UL (ref 4.1–5.7)
RBC MORPH BLD: ABNORMAL
SARS-COV-2 RNA RESP QL NAA+PROBE: NOT DETECTED
SERVICE CMNT-IMP: ABNORMAL
SODIUM SERPL-SCNC: 142 MMOL/L (ref 136–145)
SOURCE: NORMAL
TROPONIN T SERPL HS-MCNC: 12.3 NG/L (ref 0–22)
WBC # BLD AUTO: 15.1 K/UL (ref 4.1–11.1)

## 2025-08-08 PROCEDURE — 85025 COMPLETE CBC W/AUTO DIFF WBC: CPT

## 2025-08-08 PROCEDURE — 82962 GLUCOSE BLOOD TEST: CPT

## 2025-08-08 PROCEDURE — 80053 COMPREHEN METABOLIC PANEL: CPT

## 2025-08-08 PROCEDURE — 84145 PROCALCITONIN (PCT): CPT

## 2025-08-08 PROCEDURE — 74177 CT ABD & PELVIS W/CONTRAST: CPT

## 2025-08-08 PROCEDURE — 2500000003 HC RX 250 WO HCPCS: Performed by: EMERGENCY MEDICINE

## 2025-08-08 PROCEDURE — 87636 SARSCOV2 & INF A&B AMP PRB: CPT

## 2025-08-08 PROCEDURE — 6360000004 HC RX CONTRAST MEDICATION: Performed by: EMERGENCY MEDICINE

## 2025-08-08 PROCEDURE — 83605 ASSAY OF LACTIC ACID: CPT

## 2025-08-08 PROCEDURE — 93005 ELECTROCARDIOGRAM TRACING: CPT | Performed by: EMERGENCY MEDICINE

## 2025-08-08 PROCEDURE — 84484 ASSAY OF TROPONIN QUANT: CPT

## 2025-08-08 PROCEDURE — 87040 BLOOD CULTURE FOR BACTERIA: CPT

## 2025-08-08 PROCEDURE — 2580000003 HC RX 258: Performed by: EMERGENCY MEDICINE

## 2025-08-08 PROCEDURE — 36415 COLL VENOUS BLD VENIPUNCTURE: CPT

## 2025-08-08 PROCEDURE — 81001 URINALYSIS AUTO W/SCOPE: CPT

## 2025-08-08 PROCEDURE — 71045 X-RAY EXAM CHEST 1 VIEW: CPT

## 2025-08-08 PROCEDURE — 96374 THER/PROPH/DIAG INJ IV PUSH: CPT

## 2025-08-08 PROCEDURE — 99285 EMERGENCY DEPT VISIT HI MDM: CPT

## 2025-08-08 PROCEDURE — 6360000002 HC RX W HCPCS: Performed by: EMERGENCY MEDICINE

## 2025-08-08 RX ORDER — IOPAMIDOL 755 MG/ML
100 INJECTION, SOLUTION INTRAVASCULAR
Status: COMPLETED | OUTPATIENT
Start: 2025-08-08 | End: 2025-08-08

## 2025-08-08 RX ORDER — SODIUM CHLORIDE, SODIUM LACTATE, POTASSIUM CHLORIDE, AND CALCIUM CHLORIDE .6; .31; .03; .02 G/100ML; G/100ML; G/100ML; G/100ML
30 INJECTION, SOLUTION INTRAVENOUS ONCE
Status: COMPLETED | OUTPATIENT
Start: 2025-08-08 | End: 2025-08-09

## 2025-08-08 RX ADMIN — IOPAMIDOL 75 ML: 755 INJECTION, SOLUTION INTRAVENOUS at 23:07

## 2025-08-08 RX ADMIN — WATER 1000 MG: 1 INJECTION INTRAMUSCULAR; INTRAVENOUS; SUBCUTANEOUS at 21:38

## 2025-08-08 RX ADMIN — SODIUM CHLORIDE, SODIUM LACTATE, POTASSIUM CHLORIDE, AND CALCIUM CHLORIDE 1482 ML: .6; .31; .03; .02 INJECTION, SOLUTION INTRAVENOUS at 21:56

## 2025-08-08 ASSESSMENT — PAIN - FUNCTIONAL ASSESSMENT: PAIN_FUNCTIONAL_ASSESSMENT: ADULT NONVERBAL PAIN SCALE (NPVS)

## 2025-08-09 VITALS
WEIGHT: 103 LBS | BODY MASS INDEX: 20.22 KG/M2 | DIASTOLIC BLOOD PRESSURE: 78 MMHG | SYSTOLIC BLOOD PRESSURE: 118 MMHG | OXYGEN SATURATION: 96 % | HEART RATE: 90 BPM | HEIGHT: 60 IN | RESPIRATION RATE: 22 BRPM | TEMPERATURE: 98.9 F

## 2025-08-09 LAB
APPEARANCE UR: CLEAR
BACTERIA URNS QL MICRO: NEGATIVE /HPF
BILIRUB UR QL: NEGATIVE
COLOR UR: ABNORMAL
EPITH CASTS URNS QL MICRO: ABNORMAL /LPF
GLUCOSE UR STRIP.AUTO-MCNC: NEGATIVE MG/DL
HGB UR QL STRIP: NEGATIVE
HYALINE CASTS URNS QL MICRO: ABNORMAL /LPF (ref 0–2)
KETONES UR QL STRIP.AUTO: NEGATIVE MG/DL
LEUKOCYTE ESTERASE UR QL STRIP.AUTO: ABNORMAL
NITRITE UR QL STRIP.AUTO: NEGATIVE
PH UR STRIP: 7 (ref 5–8)
PROT UR STRIP-MCNC: NEGATIVE MG/DL
RBC #/AREA URNS HPF: ABNORMAL /HPF (ref 0–5)
SP GR UR REFRACTOMETRY: <1.005 (ref 1–1.03)
URINE CULTURE IF INDICATED: ABNORMAL
UROBILINOGEN UR QL STRIP.AUTO: 0.2 EU/DL (ref 0.2–1)
WBC URNS QL MICRO: ABNORMAL /HPF (ref 0–4)

## 2025-08-09 RX ORDER — CYPROHEPTADINE HYDROCHLORIDE 2 MG/5ML
4 SOLUTION ORAL EVERY 8 HOURS
Qty: 150 ML | Refills: 0 | Status: SHIPPED | OUTPATIENT
Start: 2025-08-09 | End: 2025-08-14

## 2025-08-10 LAB
BACTERIA SPEC CULT: NORMAL
BACTERIA SPEC CULT: NORMAL
EKG ATRIAL RATE: 113 BPM
EKG DIAGNOSIS: NORMAL
EKG P AXIS: 38 DEGREES
EKG P-R INTERVAL: 126 MS
EKG Q-T INTERVAL: 316 MS
EKG QRS DURATION: 80 MS
EKG QTC CALCULATION (BAZETT): 433 MS
EKG R AXIS: 37 DEGREES
EKG T AXIS: 21 DEGREES
EKG VENTRICULAR RATE: 113 BPM
SERVICE CMNT-IMP: NORMAL
SERVICE CMNT-IMP: NORMAL

## 2025-08-10 PROCEDURE — 93010 ELECTROCARDIOGRAM REPORT: CPT | Performed by: INTERNAL MEDICINE

## 2025-08-14 LAB
BACTERIA SPEC CULT: NORMAL
BACTERIA SPEC CULT: NORMAL
SERVICE CMNT-IMP: NORMAL
SERVICE CMNT-IMP: NORMAL

## (undated) DEVICE — SOLIDIFIER FLD 2OZ 1500CC N DISINF IN BTL DISP SAFESORB

## (undated) DEVICE — IV STRT KT 3282] LSL INDUSTRIES INC]

## (undated) DEVICE — Device

## (undated) DEVICE — 1200 GUARD II KIT W/5MM TUBE W/O VAC TUBE: Brand: GUARDIAN

## (undated) DEVICE — ELECTRODE,RADIOTRANSLUCENT,FOAM,3PK: Brand: MEDLINE

## (undated) DEVICE — 3-PORT THROUGH-THE-PEG J-TUBE KIT: Brand: ENDOVIVE JEJUNAL FEEDING TUBE

## (undated) DEVICE — BLUNTFILL: Brand: MONOJECT

## (undated) DEVICE — ADULT SPO2 SENSOR: Brand: NELLCOR

## (undated) DEVICE — CUFF,BP,DISP,1 TUBE,SM ADLT,H: Brand: MEDLINE

## (undated) DEVICE — SET GRAV CK VLV NEEDLESS ST 3 GANGED 4WAY STPCOCK HI FLO 10

## (undated) DEVICE — SYRINGE MED 5ML STD CLR PLAS LUERLOCK TIP N CTRL DISP

## (undated) DEVICE — CATHETER IV 22GA L1IN OD0.8382-0.9144MM ID0.6096-0.6858MM 382523

## (undated) DEVICE — CANNULA CUSH AD W/ 14FT TBG

## (undated) DEVICE — BITEBLOCK ENDOSCP 60FR MAXI WHT POLYETH STURDY W/ VELC WVN

## (undated) DEVICE — SYRINGE MEDICAL 3ML CLEAR PLASTIC STANDARD NON CONTROL LUERLOCK TIP DISPOSABLE

## (undated) DEVICE — FORCEPS BX L240CM JAW DIA2.4MM WRK CHN 2.8MM ORNG L CAP W/

## (undated) DEVICE — 3M™ CUROS™ DISINFECTING CAP FOR NEEDLELESS CONNECTORS 270/CARTON 20 CARTONS/CASE CFF1-270: Brand: CUROS™

## (undated) DEVICE — BLUNTFILL WITH FILTER: Brand: MONOJECT

## (undated) DEVICE — SET ADMIN 16ML TBNG L100IN 2 Y INJ SITE IV PIGGY BK DISP (ORDER IN MULIPLES OF 48)

## (undated) DEVICE — KIT COLON W/ 1.1OZ LUB AND 2 END